# Patient Record
Sex: FEMALE | Race: WHITE | Employment: UNEMPLOYED | URBAN - METROPOLITAN AREA
[De-identification: names, ages, dates, MRNs, and addresses within clinical notes are randomized per-mention and may not be internally consistent; named-entity substitution may affect disease eponyms.]

---

## 2017-01-24 ENCOUNTER — OFFICE VISIT (OUTPATIENT)
Dept: FAMILY MEDICINE CLINIC | Age: 62
End: 2017-01-24

## 2017-01-24 VITALS
SYSTOLIC BLOOD PRESSURE: 132 MMHG | HEIGHT: 62 IN | RESPIRATION RATE: 18 BRPM | OXYGEN SATURATION: 98 % | WEIGHT: 198.4 LBS | DIASTOLIC BLOOD PRESSURE: 84 MMHG | TEMPERATURE: 98.2 F | BODY MASS INDEX: 36.51 KG/M2 | HEART RATE: 77 BPM

## 2017-01-24 DIAGNOSIS — G89.29 CHRONIC BILATERAL LOW BACK PAIN WITHOUT SCIATICA: Primary | ICD-10-CM

## 2017-01-24 DIAGNOSIS — M54.50 CHRONIC BILATERAL LOW BACK PAIN WITHOUT SCIATICA: Primary | ICD-10-CM

## 2017-01-24 DIAGNOSIS — E11.9 TYPE 2 DIABETES MELLITUS WITHOUT COMPLICATION, WITHOUT LONG-TERM CURRENT USE OF INSULIN (HCC): ICD-10-CM

## 2017-01-24 DIAGNOSIS — M17.11 ARTHRITIS OF RIGHT KNEE: ICD-10-CM

## 2017-01-24 RX ORDER — DICLOFENAC SODIUM 75 MG/1
75 TABLET, DELAYED RELEASE ORAL
Qty: 60 TAB | Refills: 1 | Status: SHIPPED | OUTPATIENT
Start: 2017-01-24 | End: 2018-09-21

## 2017-01-24 RX ORDER — TIZANIDINE 4 MG/1
4 TABLET ORAL
Qty: 45 TAB | Refills: 1 | Status: SHIPPED | OUTPATIENT
Start: 2017-01-24 | End: 2018-09-21

## 2017-01-24 NOTE — MR AVS SNAPSHOT
Visit Information Date & Time Provider Department Dept. Phone Encounter #  
 1/24/2017  3:00 PM Kaushal Mack, [address] [phone] [id number] Upcoming Health Maintenance Date Due Pneumococcal 19-64 Medium Risk (1 of 1 - PPSV23) 9/1/1975 PAP AKA CERVICAL CYTOLOGY 9/1/1977 BREAST CANCER SCRN MAMMOGRAM 9/1/2006 FOBT Q 1 YEAR AGE 50-75 9/1/2006 ZOSTER VACCINE AGE 60> 9/1/2016 FOOT EXAM Q1 11/3/2016 EYE EXAM RETINAL OR DILATED Q1 11/6/2016 MICROALBUMIN Q1 2/9/2017 LIPID PANEL Q1 2/9/2017 HEMOGLOBIN A1C Q6M 5/11/2017 DTaP/Tdap/Td series (2 - Td) 10/14/2025 Allergies as of 1/24/2017  Review Complete On: 1/24/2017 By: Kaushal Mack DO Severity Noted Reaction Type Reactions Sulfa (Sulfonamide Antibiotics)  10/09/2015   Intolerance Other (comments) Emesis Current Immunizations  Reviewed on 11/11/2016 Name Date Influenza Vaccine (Madin Welsh Canine Kidney) PF 10/10/2015  6:45 PM  
 Influenza Vaccine (Quad) PF 11/11/2016 Tdap 10/14/2015 Not reviewed this visit You Were Diagnosed With   
  
 Codes Comments Chronic bilateral low back pain without sciatica    -  Primary ICD-10-CM: M54.5, G89.29 ICD-9-CM: 724.2, 338.29 Type 2 diabetes mellitus without complication, without long-term current use of insulin (HCC)     ICD-10-CM: E11.9 ICD-9-CM: 250.00 Arthritis of right knee     ICD-10-CM: M19.90 ICD-9-CM: 716.96 Vitals BP Pulse Temp Resp Height(growth percentile) Weight(growth percentile) 132/84 77 98.2 °F (36.8 °C) (Oral) 18 5' 2\" (1.575 m) 198 lb 6.4 oz (90 kg) SpO2 BMI OB Status Smoking Status 98% 36.29 kg/m2 Postmenopausal Never Smoker Vitals History BMI and BSA Data Body Mass Index Body Surface Area  
 36.29 kg/m 2 1.98 m 2 Preferred Pharmacy Pharmacy Name Phone  [facility] [address] [facility] [phone] Your Updated Medication List  
  
   
This list is accurate as of: 1/24/17  3:39 PM.  Always use your most recent med list.  
  
  
  
  
 allopurinol 300 mg tablet Commonly known as:  ZYLOPRIM  
TAKE ONE TABLET BY MOUTH ONCE DAILY ALPRAZolam 0.25 mg tablet Commonly known as:  [place]aks Take 0.25 mg by mouth as needed for Anxiety. Blood-Glucose Meter monitoring kit Please dispense one kit  
  
 diclofenac EC 75 mg EC tablet Commonly known as:  VOLTAREN Take 1 Tab by mouth two (2) times daily as needed. glucose blood VI test strips strip Commonly known as:  blood glucose test  
Use four times a day, before meals and at bedtime, to check your blood sugar. insulin glargine 300 unit/mL (1.5 mL) Inpn Commonly known as:  TOUJEO SOLOSTAR  
50 Units by SubCUTAneous route nightly. Insulin Needles (Disposable) 31 gauge x 3/16\" Ndle Commonly known as:  PEN NEEDLE  
QHS use of lantus  
  
 lidocaine 5 % ointment Commonly known as:  XYLOCAINE Apply  to affected area as needed for Pain. lisinopril-hydroCHLOROthiazide 20-12.5 mg per tablet Commonly known as:  PRINZIDE, ZESTORETIC  
TAKE ONE TABLET BY MOUTH TWICE DAILY  
  
 nystatin 100,000 unit/mL suspension Commonly known as:  MYCOSTATIN Take 1 tsp by mouth two (2) times a day. swish and spit  
  
 omeprazole 20 mg capsule Commonly known as:  PRILOSEC Take 20 mg by mouth daily. pantoprazole 40 mg tablet Commonly known as:  PROTONIX  
TAKE ONE TABLET BY MOUTH ONCE DAILY * PARoxetine 30 mg tablet Commonly known as:  PAXIL Take 30 mg by mouth every evening. * PARoxetine 30 mg tablet Commonly known as:  PAXIL TAKE ONE TABLET BY MOUTH ONCE DAILY  
  
 simvastatin 40 mg tablet Commonly known as:  ZOCOR  
TAKE ONE TABLET BY MOUTH NIGHTLY  
  
 tiZANidine 4 mg tablet Commonly known as:  [place]ule Take 1 Tab by mouth three (3) times daily as needed. * Notice: This list has 2 medication(s) that are the same as other medications prescribed for you. Read the directions carefully, and ask your doctor or other care provider to review them with you. Prescriptions Sent to Pharmacy Refills  
 tiZANidine (ZANAFLEX) 4 mg tablet 1 Sig: Take 1 Tab by mouth three (3) times daily as needed. Class: Normal  
 Pharmacy: [address], [address] [place] [address] Ph #: [phone] Route: Oral  
 diclofenac EC (VOLTAREN) 75 mg EC tablet 1 Sig: Take 1 Tab by mouth two (2) times daily as needed. Class: Normal  
 Pharmacy: [address], [address] [place] [address] Ph #: [phone] Route: Oral  
  
We Performed the Following REFERRAL TO ORTHOPEDIC SURGERY [REF62 Custom] Comments:  
 Please evaluate patient for R knee arthritis failed injections. REFERRAL TO PHYSICAL THERAPY [EUX92 Custom] Comments:  
 Please evaluate patient for low back pain. To-Do List   
 01/24/2017 Imaging:  XR SPINE LUMB 2 OR 3 V Referral Information Referral ID Referred By Referred To  
  
 [id number] Joycelyn LINK Not Available Visits Status Start Date End Date 99 New Request 1/24/17 1/24/18 If your referral has a status of pending review or denied, additional information will be sent to support the outcome of this decision. Referral ID Referred By Referred To  
 [id number] Joycelyn LINK Not Available Visits Status Start Date End Date 1 New Request 1/24/17 1/24/18 If your referral has a status of pending review or denied, additional information will be sent to support the outcome of this decision. Patient Instructions Low Back Pain: Exercises Your Care Instructions Here are some examples of typical rehabilitation exercises for your condition. Start each exercise slowly. Ease off the exercise if you start to have pain. Your doctor or physical therapist will tell you when you can start these exercises and which ones will work best for you. How to do the exercises Press-up 1. Lie on your stomach, supporting your body with your forearms. 2. Press your elbows down into the floor to raise your upper back. As you do this, relax your stomach muscles and allow your back to arch without using your back muscles. As your press up, do not let your hips or pelvis come off the floor. 3. Hold for 15 to 30 seconds, then relax. 4. Repeat 2 to 4 times. Alternate arm and leg (bird dog) exercise Note: Do this exercise slowly. Try to keep your body straight at all times, and do not let one hip drop lower than the other. 1. Start on the floor, on your hands and knees. 2. Tighten your belly muscles. 3. Raise one leg off the floor, and hold it straight out behind you. Be careful not to let your hip drop down, because that will twist your trunk. 4. Hold for about 6 seconds, then lower your leg and switch to the other leg. 5. Repeat 8 to 12 times on each leg. 6. Over time, work up to holding for 10 to 30 seconds each time. 7. If you feel stable and secure with your leg raised, try raising the opposite arm straight out in front of you at the same time. Knee-to-chest exercise 1. Lie on your back with your knees bent and your feet flat on the floor. 2. Bring one knee to your chest, keeping the other foot flat on the floor (or keeping the other leg straight, whichever feels better on your lower back). 3. Keep your lower back pressed to the floor. Hold for at least 15 to 30 seconds. 4. Relax, and lower the knee to the starting position. 5. Repeat with the other leg. Repeat 2 to 4 times with each leg. 6. To get more stretch, put your other leg flat on the floor while pulling your knee to your chest. 
Curl-ups 1.  Lie on the floor on your back with your knees bent at a 90-degree angle. Your feet should be flat on the floor, about 12 inches from your buttocks. 2. Cross your arms over your chest. If this bothers your neck, try putting your hands behind your neck (not your head), with your elbows spread apart. 3. Slowly tighten your belly muscles and raise your shoulder blades off the floor. 4. Keep your head in line with your body, and do not press your chin to your chest. 
5. Hold this position for 1 or 2 seconds, then slowly lower yourself back down to the floor. 6. Repeat 8 to 12 times. Pelvic tilt exercise 1. Lie on your back with your knees bent. 2. \"Brace\" your stomach. This means to tighten your muscles by pulling in and imagining your belly button moving toward your spine. You should feel like your back is pressing to the floor and your hips and pelvis are rocking back. 3. Hold for about 6 seconds while you breathe smoothly. 4. Repeat 8 to 12 times. Heel dig bridging 1. Lie on your back with both knees bent and your ankles bent so that only your heels are digging into the floor. Your knees should be bent about 90 degrees. 2. Then push your heels into the floor, squeeze your buttocks, and lift your hips off the floor until your shoulders, hips, and knees are all in a straight line. 3. Hold for about 6 seconds as you continue to breathe normally, and then slowly lower your hips back down to the floor and rest for up to 10 seconds. 4. Do 8 to 12 repetitions. Hamstring stretch in doorway 1. Lie on your back in a doorway, with one leg through the open door. 2. Slide your leg up the wall to straighten your knee. You should feel a gentle stretch down the back of your leg. 3. Hold the stretch for at least 15 to 30 seconds. Do not arch your back, point your toes, or bend either knee. Keep one heel touching the floor and the other heel touching the wall. 4. Repeat with your other leg. 5. Do 2 to 4 times for each leg. Hip flexor stretch 1. Kneel on the floor with one knee bent and one leg behind you. Place your forward knee over your foot. Keep your other knee touching the floor. 2. Slowly push your hips forward until you feel a stretch in the upper thigh of your rear leg. 3. Hold the stretch for at least 15 to 30 seconds. Repeat with your other leg. 4. Do 2 to 4 times on each side. Wall sit 1. Stand with your back 10 to 12 inches away from a wall. 2. Lean into the wall until your back is flat against it. 3. Slowly slide down until your knees are slightly bent, pressing your lower back into the wall. 4. Hold for about 6 seconds, then slide back up the wall. 5. Repeat 8 to 12 times. Follow-up care is a key part of your treatment and safety. Be sure to make and go to all appointments, and call your doctor if you are having problems. It's also a good idea to know your test results and keep a list of the medicines you take. Where can you learn more? Go to http://negro-ryder.info/. Enter S426 in the search box to learn more about \"Low Back Pain: Exercises. \" Current as of: May 23, 2016 Content Version: 11.1 © 8538-2026 [employer]. Care instructions adapted under license by [employer] (which disclaims liability or warranty for this information). If you have questions about a medical condition or this instruction, always ask your healthcare professional. [place] [address] any warranty or liability for your use of this information. Introducing [facility]! Rebeca Alvarez introduces [employer] patient portal. Now you can access parts of your medical record, email your doctor's office, and request medication refills online. 1. In your internet browser, go to [email or web address] 2. Click on the First Time User? Click Here link in the Sign In box. You will see the New Member Sign Up page. 3. Enter your [employer] Access Code exactly as it appears below. You will not need to use this code after youve completed the sign-up process. If you do not sign up before the expiration date, you must request a new code. · [employer] Access Code: [email or web address] Expires: 2/9/2017  2:06 PM 
 
4. Enter the last four digits of your Social Security Number (xxxx) and Date of Birth (mm/dd/yyyy) as indicated and click Submit. You will be taken to the next sign-up page. 5. Create a Project [employer]at ID. This will be your [employer] login ID and cannot be changed, so think of one that is secure and easy to remember. 6. Create a [employer] password. You can change your password at any time. 7. Enter your Password Reset Question and Answer. This can be used at a later time if you forget your password. 8. Enter your e-mail address. You will receive e-mail notification when new information is available in [address]. 9. Click Sign Up. You can now view and download portions of your medical record. 10. Click the Download Summary menu link to download a portable copy of your medical information. If you have questions, please visit the Frequently Asked Questions section of the [employer] website. Remember, [employer] is NOT to be used for urgent needs. For medical emergencies, dial 911. Now available from your iPhone and Android! Please provide this summary of care documentation to your next provider. Your primary care clinician is listed as Nancy Boyce. If you have any questions after today's visit, please call [phone].

## 2017-01-24 NOTE — PATIENT INSTRUCTIONS

## 2017-01-24 NOTE — PROGRESS NOTES
Pt c/o lower back pain, ongoing problem, worse lately  Would like referral for ortho for knee as well

## 2017-01-24 NOTE — PROGRESS NOTES
Katiana West is a 62 y.o. female   Chief Complaint   Patient presents with    Back Pain    pt states she has been having LBP for past few months and not getting better. Does come and go. Pthas taken tylenol with little relief. Pt denies shooting pain down either leg. Pt has been using a heating pad as well with little relief. Currently pain is fine when seated gets up to 10/10 at its worst.      Pt also with R knee pain states that she heard a loud popping. Pt states she is ready to see ortho to discuss surgery. Pt states she has been working on her diet and continuing to lose [id number]. States her glucose is down in 80's and would like to back off insulin. Will decrease to 48    Chief Complaint   Patient presents with    Back Pain     she is a 62y.o. year old female who presents for evalution. Reviewed PmHx, RxHx, FmHx, SocHx, AllgHx and updated and dated in the chart. Review of Systems - negative except as listed above in the HPI    Objective:     Vitals:    01/24/17 1507   BP: 132/84   Pulse: 77   Resp: 18   Temp: 98.2 °F (36.8 °C)   TempSrc: Oral   SpO2: 98%   Weight: 198 lb 6.4 oz (90 kg)   Height: 5' 2\" (1.575 m)       Current Outpatient Prescriptions   Medication Sig    insulin glargine (TOUJEO SOLOSTAR) 300 unit/mL (1.5 mL) inpn 50 Units by SubCUTAneous route nightly.  tiZANidine (ZANAFLEX) 4 mg tablet Take 1 Tab by mouth three (3) times daily as needed.  diclofenac EC (VOLTAREN) 75 mg EC tablet Take 1 Tab by mouth two (2) times daily as needed.  pantoprazole (PROTONIX) 40 mg tablet TAKE ONE TABLET BY MOUTH ONCE DAILY    simvastatin (ZOCOR) 40 mg tablet TAKE ONE TABLET BY MOUTH NIGHTLY    PARoxetine (PAXIL) 30 mg tablet TAKE ONE TABLET BY MOUTH ONCE DAILY    lidocaine (XYLOCAINE) 5 % ointment Apply  to affected area as needed for Pain.     lisinopril-hydrochlorothiazide (PRINZIDE, ZESTORETIC) 20-12.5 mg per tablet TAKE ONE TABLET BY MOUTH TWICE DAILY    allopurinol (ZYLOPRIM) 300 mg tablet TAKE ONE TABLET BY MOUTH ONCE DAILY    Insulin Needles, Disposable, (PEN NEEDLE) 31 gauge x 3/16\" ndle QHS use of lantus    Blood-Glucose Meter monitoring kit Please dispense one kit    glucose blood VI test strips (BLOOD GLUCOSE TEST) strip Use four times a day, before meals and at bedtime, to check your blood sugar.  ALPRAZolam (XANAX) 0.25 mg tablet Take 0.25 mg by mouth as needed for Anxiety.  nystatin (MYCOSTATIN) 100,000 unit/mL suspension Take 1 tsp by mouth two (2) times a day. swish and spit    PARoxetine (PAXIL) 30 mg tablet Take 30 mg by mouth every evening.  omeprazole (PRILOSEC) 20 mg capsule Take 20 mg by mouth daily. No current facility-administered medications for this visit. Physical Examination: General appearance - alert, well appearing, and in no distress  Eyes - pupils equal and reactive, extraocular eye movements intact  Chest - clear to auscultation, no wheezes, rales or rhonchi, symmetric air entry  Heart - normal rate, regular rhythm, normal S1, S2, no murmurs, rubs, clicks or gallops  Back exam -decreased ROM, no tenderness, palpable spasm      Assessment/ Plan:   [facility] [place] was seen today for back pain. Diagnoses and all orders for this visit:    Chronic bilateral low back pain without sciatica  -     XR SPINE LUMB 2 OR 3 V; Future  -     REFERRAL TO PHYSICAL THERAPY  -     tiZANidine (ZANAFLEX) 4 mg tablet; Take 1 Tab by mouth three (3) times daily as needed. -     diclofenac EC (VOLTAREN) 75 mg EC tablet; Take 1 Tab by mouth two (2) times daily as needed. Type 2 diabetes mellitus without complication, without long-term current use of insulin ([facility])  -     insulin glargine (TOUJEO SOLOSTAR) 300 unit/mL (1.5 mL) inpn; 50 Units by SubCUTAneous route nightly. Arthritis of right knee  -     REFERRAL TO ORTHOPEDIC SURGERY       Follow-up Disposition:  Return if symptoms worsen or fail to improve.     I have discussed the diagnosis with the patient and the intended plan as seen in the above orders. The patient has received an after-visit summary and questions were answered concerning future plans. Pt conveyed understanding of plan.     Medication Side Effects and Warnings were discussed with patient      Geoffery Mobile, DO

## 2017-02-03 ENCOUNTER — TELEPHONE (OUTPATIENT)
Dept: FAMILY MEDICINE CLINIC | Age: 62
End: 2017-02-03

## 2017-02-22 DIAGNOSIS — E11.9 TYPE 2 DIABETES MELLITUS WITHOUT COMPLICATION, WITHOUT LONG-TERM CURRENT USE OF INSULIN (HCC): ICD-10-CM

## 2017-03-27 RX ORDER — PANTOPRAZOLE SODIUM 40 MG/1
TABLET, DELAYED RELEASE ORAL
Qty: 30 TAB | Refills: 0 | Status: SHIPPED | OUTPATIENT
Start: 2017-03-27 | End: 2017-05-03 | Stop reason: SDUPTHER

## 2017-04-04 ENCOUNTER — PATIENT OUTREACH (OUTPATIENT)
Dept: FAMILY MEDICINE CLINIC | Age: 62
End: 2017-04-04

## 2017-04-04 NOTE — Clinical Note
Dr Byron Guajardo, [facility]--- 4/4/17, This writer/NN and [facility] access, able to confirm, patient hospitalized, 3/28/17 - 3/31/17 at [facility] related to Right Knee Osteoarthritis/Right Total Knee Arthroplasty by Dr Lima Leach. Printed Operative Report, Consult Note and Discharge Summary for Dr Byron Guajardo to review. Per Discharge Summary: Tylenol for pain control, Narcotics causing pt to be lethargic. SCD's and ASA 325mg BID for DVT proph x 4 weeks. Hyponatremic post op(Na at 127 on 3/30/17 and Na at 130 on 3/31/17), fluid restriction, 2000cc/no free water and PCP F/U. Patient last seen by Dr Byron Guajardo, 1/24/17.  Past Medical History Includes: Diabetes(11/2016 A1C at 7.9, up with cortisone injection) Thanks, [facility]

## 2017-05-04 RX ORDER — PANTOPRAZOLE SODIUM 40 MG/1
TABLET, DELAYED RELEASE ORAL
Qty: 30 TAB | Refills: 0 | Status: SHIPPED | OUTPATIENT
Start: 2017-05-04 | End: 2017-06-03 | Stop reason: SDUPTHER

## 2017-05-24 ENCOUNTER — PATIENT OUTREACH (OUTPATIENT)
Dept: FAMILY MEDICINE CLINIC | Age: 62
End: 2017-05-24

## 2017-05-24 NOTE — PROGRESS NOTES
Patient S/P 3/28/17 - 3/31/17 at [facility] related to Right Knee Osteoarthritis/Right Total Knee Arthroplasty by Dr Marizol Haile.  Per Discharge Summary:  Tylenol for pain control, Narcotics causing pt to be lethargic. SCD's and ASA 325mg BID for DVT proph x 4 weeks. Hyponatremic post op(Na at 127 on 3/30/17 and Na at 130 on 3/31/17), fluid restriction, 2000cc/no free water and PCP F/U. Patient last seen by Dr Saulo Kent, 1/24/17. Past Medical History Includes: Diabetes(11/2016 A1C at 7.9, up with cortisone injection), Hyperlipidemia, HTN, Psoriasis, Arthritis/Pain, and Depression. 5/24/17, This writer/NN attempted to contact patient at listed phone number, however, not in service. 5/24/17, This writer/NN attempted to contact patient at listed mobile number,  left voicemail message with IFP/NN contact information and reminder to call with questions, concerns or need for appts. PLAN: No further ED/Hospitalizations noted at this time. Will close above transitions of care/post hospitalization episode and remain available for further NN needs. Should patient return call or be seen for F/U appt, discuss NN role and purpose of outreach attempts, post hospitalization. Discuss discharge instructions, further symptoms or concerns, medications(Compliance and Reconciliation), recent blood sugar readings, HH progress/?agency, and F/U appts. Provide instruction, goal work and resource connection as indicated.

## 2017-06-05 RX ORDER — PANTOPRAZOLE SODIUM 40 MG/1
TABLET, DELAYED RELEASE ORAL
Qty: 30 TAB | Refills: 5 | Status: SHIPPED | OUTPATIENT
Start: 2017-06-05 | End: 2017-12-04 | Stop reason: SDUPTHER

## 2017-06-06 ENCOUNTER — OFFICE VISIT (OUTPATIENT)
Dept: FAMILY MEDICINE CLINIC | Age: 62
End: 2017-06-06

## 2017-06-06 VITALS
WEIGHT: 172.6 LBS | BODY MASS INDEX: 31.76 KG/M2 | RESPIRATION RATE: 18 BRPM | SYSTOLIC BLOOD PRESSURE: 112 MMHG | TEMPERATURE: 98.2 F | OXYGEN SATURATION: 100 % | HEART RATE: 80 BPM | DIASTOLIC BLOOD PRESSURE: 88 MMHG | HEIGHT: 62 IN

## 2017-06-06 DIAGNOSIS — Z12.11 COLON CANCER SCREENING: ICD-10-CM

## 2017-06-06 DIAGNOSIS — E11.9 TYPE 2 DIABETES MELLITUS WITHOUT COMPLICATION, WITHOUT LONG-TERM CURRENT USE OF INSULIN (HCC): Primary | ICD-10-CM

## 2017-06-06 DIAGNOSIS — Z00.00 ROUTINE GENERAL MEDICAL EXAMINATION AT A HEALTH CARE FACILITY: ICD-10-CM

## 2017-06-06 DIAGNOSIS — Z12.39 SCREENING FOR MALIGNANT NEOPLASM OF BREAST: ICD-10-CM

## 2017-06-06 DIAGNOSIS — I10 ESSENTIAL HYPERTENSION: ICD-10-CM

## 2017-06-06 DIAGNOSIS — E78.00 HYPERCHOLESTEROLEMIA: ICD-10-CM

## 2017-06-06 DIAGNOSIS — Z23 ENCOUNTER FOR IMMUNIZATION: ICD-10-CM

## 2017-06-06 DIAGNOSIS — Z13.39 SCREENING FOR ALCOHOLISM: ICD-10-CM

## 2017-06-06 NOTE — MR AVS SNAPSHOT
Visit Information Date & Time Provider Department Dept. Phone Encounter #  
 6/6/2017  9:15 AM Quiana Koch, Naida Olivera [phone] [id number] Follow-up Instructions Return in about 3 months (around 9/6/2017), or if symptoms worsen or fail to improve. Upcoming Health Maintenance Date Due Pneumococcal 19-64 Medium Risk (1 of 1 - PPSV23) 9/1/1975 PAP AKA CERVICAL CYTOLOGY 9/1/1977 BREAST CANCER SCRN MAMMOGRAM 9/1/2006 FOBT Q 1 YEAR AGE 50-75 9/1/2006 ZOSTER VACCINE AGE 60> 9/1/2016 FOOT EXAM Q1 11/3/2016 EYE EXAM RETINAL OR DILATED Q1 11/6/2016 MICROALBUMIN Q1 2/9/2017 LIPID PANEL Q1 2/9/2017 HEMOGLOBIN A1C Q6M 5/11/2017 INFLUENZA AGE 9 TO ADULT 8/1/2017 DTaP/Tdap/Td series (2 - Td) 10/14/2025 Allergies as of 6/6/2017  Review Complete On: 6/6/2017 By: Quiana Koch DO Severity Noted Reaction Type Reactions Sulfa (Sulfonamide Antibiotics)  10/09/2015   Intolerance Other (comments) Emesis Current Immunizations  Reviewed on 11/11/2016 Name Date Influenza Vaccine (Madin Sheela Canine Kidney) PF 10/10/2015  6:45 PM  
 Influenza Vaccine (Quad) PF 11/11/2016 Pneumococcal Polysaccharide (PPSV-23)  Incomplete Tdap 10/14/2015 Not reviewed this visit You Were Diagnosed With   
  
 Codes Comments Type 2 diabetes mellitus without complication, without long-term current use of insulin (HCC)    -  Primary ICD-10-CM: E11.9 ICD-9-CM: 250.00 Essential hypertension     ICD-10-CM: I10 
ICD-9-CM: 401.9 Hypercholesterolemia     ICD-10-CM: E78.00 ICD-9-CM: 272.0 Screening for malignant neoplasm of breast     ICD-10-CM: Z12.39 
ICD-9-CM: V76.10 Colon cancer screening     ICD-10-CM: Z12.11 ICD-9-CM: V76.51 Routine general medical examination at a health care facility     ICD-10-CM: Z00.00 ICD-9-CM: V70.0 Screening for alcoholism     ICD-10-CM: Z13.89 ICD-9-CM: V79.1 Encounter for immunization     ICD-10-CM: W37 ICD-9-CM: V03.89 Vitals BP Pulse Temp Resp Height(growth percentile) Weight(growth percentile) 112/88 80 98.2 °F (36.8 °C) (Oral) 18 5' 2\" (1.575 m) 172 lb 9.6 oz (78.3 kg) SpO2 BMI OB Status Smoking Status 100% 31.57 kg/m2 Postmenopausal Never Smoker Vitals History BMI and BSA Data Body Mass Index Body Surface Area  
 31.57 kg/m 2 1.85 m 2 Preferred Pharmacy Pharmacy Name Phone [facility], [address] [place] [address] Your Updated Medication List  
  
   
This list is accurate as of: 6/6/17 10:27 AM.  Always use your most recent med list.  
  
  
  
  
 allopurinol 300 mg tablet Commonly known as:  ZYLOPRIM  
TAKE ONE TABLET BY MOUTH ONCE DAILY ALPRAZolam 0.25 mg tablet Commonly known as:  Meena Mannheim Take 0.25 mg by mouth as needed for Anxiety. Blood-Glucose Meter monitoring kit Please dispense one kit  
  
 diclofenac EC 75 mg EC tablet Commonly known as:  VOLTAREN Take 1 Tab by mouth two (2) times daily as needed. glucose blood VI test strips strip Commonly known as:  blood glucose test  
Use four times a day, before meals and at bedtime, to check your blood sugar. insulin glargine 300 unit/mL (1.5 mL) Inpn Commonly known as:  TOUJEO SOLOSTAR  
45 Units by SubCUTAneous route nightly. Insulin Needles (Disposable) 31 gauge x 3/16\" Ndle Commonly known as:  PEN NEEDLE  
QHS use of lantus  
  
 lidocaine 5 % ointment Commonly known as:  XYLOCAINE Apply  to affected area as needed for Pain. lisinopril-hydroCHLOROthiazide 20-12.5 mg per tablet Commonly known as:  PRINZIDE, ZESTORETIC  
TAKE ONE TABLET BY MOUTH TWICE DAILY  
  
 nystatin 100,000 unit/mL suspension Commonly known as:  MYCOSTATIN Take 1 tsp by mouth two (2) times a day. swish and spit  
  
 omeprazole 20 mg capsule Commonly known as:  PRILOSEC Take 20 mg by mouth daily. pantoprazole 40 mg tablet Commonly known as:  PROTONIX  
TAKE ONE TABLET BY MOUTH ONCE DAILY * PARoxetine 30 mg tablet Commonly known as:  PAXIL Take 30 mg by mouth every evening. * PARoxetine 30 mg tablet Commonly known as:  PAXIL TAKE ONE TABLET BY MOUTH ONCE DAILY  
  
 simvastatin 40 mg tablet Commonly known as:  ZOCOR  
TAKE ONE TABLET BY MOUTH NIGHTLY. tiZANidine 4 mg tablet Commonly known as:  Gerarda Gena Take 1 Tab by mouth three (3) times daily as needed. * Notice: This list has 2 medication(s) that are the same as other medications prescribed for you. Read the directions carefully, and ask your doctor or other care provider to review them with you. We Performed the Following HEMOGLOBIN A1C WITH EAG [05431 CPT(R)] LIPID PANEL [05703 CPT(R)] METABOLIC PANEL, COMPREHENSIVE [86077 CPT(R)] MICROALBUMIN, UR, RAND W/ MICROALBUMIN/CREA RATIO [id number] CPT(R)] OCCULT BLOOD, IMMUNOASSAY (FIT) [id number] CPT(R)] PNEUMOCOCCAL POLYSACCHARIDE VACCINE, 23-VALENT, ADULT OR IMMUNOSUPPRESSED PT DOSE, [65257 CPT(R)] [place] IMMUNIZ ADMIN,1 SINGLE/COMB VAC/TOXOID [id number] CPT(R)] Follow-up Instructions Return in about 3 months (around 9/6/2017), or if symptoms worsen or fail to improve. To-Do List   
 06/06/2017 Imaging:  PATY MAMMO BI SCREENING INCL CAD Patient Instructions Nutrition Tips for Diabetes: After Your Visit Your Care Instructions A healthy diet is important to manage diabetes. It helps you lose weight (if you need to) and keep it off. It gives you the nutrition and energy your body needs and helps prevent heart disease. But a diet for diabetes does not mean that you have to eat special foods. You can eat what your family eats, including occasional sweets and other favorites.  But you do have to pay attention to how often you eat and how much you eat of certain foods. The right plan for you will give you meals that help you keep your blood sugar at healthy levels. Try to eat a variety of foods and to spread carbohydrate throughout the day. Carbohydrate raises blood sugar higher and more quickly than any other nutrient does. Carbohydrate is found in sugar, breads and cereals, fruit, starchy vegetables such as potatoes and corn, and milk and yogurt. You may want to work with a dietitian or diabetes educator to help you plan meals and snacks. A dietitian or diabetes educator also can help you lose weight if that is one of your goals. The following tips can help you enjoy your meals and stay healthy. Follow-up care is a key part of your treatment and safety. Be sure to make and go to all appointments, and call your doctor if you are having problems. Its also a good idea to know your test results and keep a list of the medicines you take. How can you care for yourself at home? · Learn which foods have carbohydrate and how much carbohydrate to eat. A dietitian or diabetes educator can help you learn to keep track of how much carbohydrate you eat. · Spread carbohydrate throughout the day. Eat some carbohydrate at all meals, but do not eat too much at any one time. · Plan meals to include food from all the food groups. These are the food groups and some example portion sizes: ¨ Grains: 1 slice of bread (1 ounce), ½ cup of cooked cereal, and 1/3 cup of cooked pasta or rice. These have about 15 grams of carbohydrate in a serving. Choose whole grains such as whole wheat bread or crackers, oatmeal, and brown rice more often than refined grains. ¨ Fruit: 1 small fresh fruit, such as an apple or orange; ½ of a banana; ½ cup of chopped, cooked, or canned fruit; ½ cup of fruit juice; 1 cup of melon or raspberries; and 2 tablespoons of dried fruit. These have about 15 grams of carbohydrate in a serving. ¨ Dairy: 1 cup of nonfat or low-fat milk and 2/3 cup of plain yogurt. These have about 15 grams of carbohydrate in a serving. ¨ Protein foods: Beef, chicken, turkey, fish, eggs, tofu, cheese, cottage cheese, and peanut butter. A serving size of meat is 3 ounces, which is about the size of a deck of cards. Examples of meat substitute serving sizes (equal to 1 ounce of meat) are 1/4 cup of cottage cheese, 1 egg, 1 tablespoon of peanut butter, and ½ cup of tofu. These have very little or no carbohydrate per serving. ¨ Vegetables: Starchy vegetables such as ½ cup of cooked dried beans, peas, potatoes, or corn have about 15 grams of carbohydrate. Nonstarchy vegetables have very little carbohydrate, such as 1 cup of raw leafy vegetables (such as spinach), ½ cup of other vegetables (cooked or chopped), and 3/4 cup of vegetable juice. · Use the plate format to plan meals. It is a good, quick way to make sure that you have a balanced meal. It also helps you spread carbohydrate throughout the day. You divide your plate by types of foods. Put vegetables on half the plate, meat or meat substitutes on one-quarter of the plate, and a grain or starchy vegetable (such as brown rice or a potato) in the final quarter of the plate. To this you can add a small piece of fruit and 1 cup of milk or yogurt, depending on how much carbohydrate you are supposed to eat at a meal. 
· Talk to your dietitian or diabetes educator about ways to add limited amounts of sweets into your meal plan. You can eat these foods now and then, as long as you include the amount of carbohydrate they have in your daily carbohydrate allowance. · If you drink alcohol, limit it to no more than 1 drink a day for women and 2 drinks a day for men. If you are pregnant, no amount of alcohol is known to be safe. · Protein, fat, and fiber do not raise blood sugar as much as carbohydrate does.  If you eat a lot of these nutrients in a meal, your blood sugar will rise more slowly than it would otherwise. · Limit saturated fats, such as those from meat and dairy products. Try to replace it with monounsaturated fat, such as olive oil. This is a healthier choice because people who have diabetes are at higher-than-average risk of heart disease. But use a modest amount of olive oil. A tablespoon of olive oil has 14 grams of fat and 120 calories. · Exercise lowers blood sugar. If you take insulin by shots or pump, you can use less than you would if you were not exercising. Keep in mind that timing matters. If you exercise within 1 hour after a meal, your body may need less insulin for that meal than it would if you exercised 3 hours after the meal. Test your blood sugar to find out how exercise affects your need for insulin. · Exercise on most days of the week. Aim for at least 30 minutes. Exercise helps you stay at a healthy weight and helps your body use insulin. Walking is an easy way to get exercise. Gradually increase the amount you walk every day. You also may want to swim, bike, or do other activities. When you eat out · Learn to estimate the serving sizes of foods that have carbohydrate. If you measure food at home, it will be easier to estimate the amount in a serving of restaurant food. · If the meal you order has too much carbohydrate (such as potatoes, corn, or baked beans), ask to have a low-carbohydrate food instead. Ask for a salad or green vegetables. · If you use insulin, check your blood sugar before and after eating out to help you plan how much to eat in the future. · If you eat more carbohydrate at a meal than you had planned, take a walk or do other exercise. This will help lower your blood sugar. Where can you learn more? Go to [employer] Enter V217 in the search box to learn more about \"Nutrition Tips for Diabetes: After Your Visit. \"  
© 3949-8328 [employer].  Care instructions adapted under license by [facility] (which disclaims liability or warranty for this information). This care instruction is for use with your licensed healthcare professional. If you have questions about a medical condition or this instruction, always ask your healthcare professional. [address] any warranty or liability for your use of this information. Content Version: 57.9.228705; Current as of: June 4, 2014 Introducing [facility] SERVICES! [facility] introduces [employer] patient portal. Now you can access parts of your medical record, email your doctor's office, and request medication refills online. 1. In your internet browser, go to [email or web address] 2. Click on the First Time User? Click Here link in the Sign In box. You will see the New Member Sign Up page. 3. Enter your [employer] Access Code exactly as it appears below. You will not need to use this code after youve completed the sign-up process. If you do not sign up before the expiration date, you must request a new code. · [employer] Access Code: [email or web address] Expires: 9/4/2017 10:27 AM 
 
4. Enter the last four digits of your Social Security Number (xxxx) and Date of Birth (mm/dd/yyyy) as indicated and click Submit. You will be taken to the next sign-up page. 5. Create a [employer] ID. This will be your [employer] login ID and cannot be changed, so think of one that is secure and easy to remember. 6. Create a [employer] password. You can change your password at any time. 7. Enter your Password Reset Question and Answer. This can be used at a later time if you forget your password. 8. Enter your e-mail address. You will receive e-mail notification when new information is available in [address] 9. Click Sign Up. You can now view and download portions of your medical record. 10. Click the Download Summary menu link to download a portable copy of your medical information. If you have questions, please visit the Frequently Asked Questions section of the [employer] website. Remember, [employer] is NOT to be used for urgent needs. For medical emergencies, dial 911. Now available from your iPhone and Android! Please provide this summary of care documentation to your next provider. Your primary care clinician is listed as Rebecca Milligan. If you have any questions after today's visit, please call [phone].

## 2017-06-06 NOTE — PATIENT INSTRUCTIONS

## 2017-06-06 NOTE — PROGRESS NOTES
Alexis Kurtz is a 62 y.o. female   Chief Complaint   Patient presents with    Follow-up    Pt here for f/u and had a total R knee 2 months prior and is doing PT. States she is about finished with PT and is getting better ROM. Pt also with dm and has lost 26 lbs since her prior visit. Pt states she has made lifestyle changes so that she can maintain her diet. Pt states avoiding junk food. BP remains well controlled. Due for recheck of chol and is fasting last check >1 year ago but at goal then    she is a 62y.o. year old female who presents for evalution. Reviewed PmHx, RxHx, FmHx, SocHx, AllgHx and updated and dated in the chart. Review of Systems - negative except as listed above in the HPI    Objective:     Vitals:    06/06/17 0948   BP: 112/88   Pulse: 80   Resp: 18   Temp: 98.2 °F (36.8 °C)   TempSrc: Oral   SpO2: 100%   Weight: 172 lb 9.6 oz (78.3 kg)   Height: 5' 2\" (1.575 m)       Current Outpatient Prescriptions   Medication Sig    pantoprazole (PROTONIX) 40 mg tablet TAKE ONE TABLET BY MOUTH ONCE DAILY    simvastatin (ZOCOR) 40 mg tablet TAKE ONE TABLET BY MOUTH NIGHTLY.  PARoxetine (PAXIL) 30 mg tablet TAKE ONE TABLET BY MOUTH ONCE DAILY    insulin glargine (TOUJEO SOLOSTAR) 300 unit/mL (1.5 mL) inpn 45 Units by SubCUTAneous route nightly.  lisinopril-hydroCHLOROthiazide (PRINZIDE, ZESTORETIC) 20-12.5 mg per tablet TAKE ONE TABLET BY MOUTH TWICE DAILY    tiZANidine (ZANAFLEX) 4 mg tablet Take 1 Tab by mouth three (3) times daily as needed.  diclofenac EC (VOLTAREN) 75 mg EC tablet Take 1 Tab by mouth two (2) times daily as needed.  lidocaine (XYLOCAINE) 5 % ointment Apply  to affected area as needed for Pain.     allopurinol (ZYLOPRIM) 300 mg tablet TAKE ONE TABLET BY MOUTH ONCE DAILY    Insulin Needles, Disposable, (PEN NEEDLE) 31 gauge x 3/16\" ndle QHS use of lantus    Blood-Glucose Meter monitoring kit Please dispense one kit    glucose blood VI test strips (BLOOD GLUCOSE TEST) strip Use four times a day, before meals and at bedtime, to check your blood sugar.  ALPRAZolam (XANAX) 0.25 mg tablet Take 0.25 mg by mouth as needed for Anxiety.  nystatin (MYCOSTATIN) 100,000 unit/mL suspension Take 1 tsp by mouth two (2) times a day. swish and spit    PARoxetine (PAXIL) 30 mg tablet Take 30 mg by mouth every evening.  omeprazole (PRILOSEC) 20 mg capsule Take 20 mg by mouth daily. No current facility-administered medications for this visit. Physical Examination: General appearance - alert, well appearing, and in no distress  Eyes - pupils equal and reactive, extraocular eye movements intact  Chest - clear to auscultation, no wheezes, rales or rhonchi, symmetric air entry  Heart - normal rate, regular rhythm, normal S1, S2, no murmurs, rubs, clicks or gallops  Abdomen - soft, nontender, nondistended, no masses or organomegaly  Extremities - peripheral pulses normal, no pedal edema, no clubbing or cyanosis, feet normal, good pulses, normal color, temperature and sensation, monofilament sensory exam is normal in both feet, no edema, redness or tenderness in the calves or thighs  Skin - normal coloration and turgor, no rashes, no suspicious skin lesions noted      Assessment/ Plan:   Pearl Dudley was seen today for follow-up. Diagnoses and all orders for this visit:    Type 2 diabetes mellitus without complication, without long-term current use of insulin (HCC)  -     MICROALBUMIN, UR, RAND W/ MICROALBUMIN/CREA RATIO  -     HEMOGLOBIN A1C WITH EAG    Essential hypertension  @ goal on meds  Hypercholesterolemia  -     METABOLIC PANEL, COMPREHENSIVE  -     LIPID PANEL    Screening for malignant neoplasm of breast  -     PATY MAMMO BI SCREENING INCL CAD;  Future    Colon cancer screening  -     OCCULT BLOOD, IMMUNOASSAY (FIT)    Routine general medical examination at a health care facility    Screening for alcoholism    Encounter for immunization  -     Cancel: Pneumococcal polysaccharide vaccine, 23-valent, adult or immunosuppressed pt dose  -     Cancel: [place] IMMUNIZ ADMIN,1 SINGLE/COMB VAC/TOXOID       Follow-up Disposition:  Return in about 3 months (around 9/6/2017), or if symptoms worsen or fail to improve. I have discussed the diagnosis with the patient and the intended plan as seen in the above orders. The patient has received an after-visit summary and questions were answered concerning future plans. Pt conveyed understanding of plan. Medication Side Effects and Warnings were discussed with patient      Joanie Li, DO       This is an Initial Medicare Annual Wellness Exam (AWV) (Performed 12 months after IPPE or effective date of Medicare Part B enrollment, Once in a lifetime)    I have reviewed the patient's medical history in detail and updated the computerized patient record. History     Past Medical History:   Diagnosis Date    Arthritis     Diabetes ([address]     Gout     Hernia, hiatal     Hypertension       Past Surgical History:   Procedure Laterality Date    HX KNEE REPLACEMENT Right      Current Outpatient Prescriptions   Medication Sig Dispense Refill    pantoprazole (PROTONIX) 40 mg tablet TAKE ONE TABLET BY MOUTH ONCE DAILY 30 Tab 5    simvastatin (ZOCOR) 40 mg tablet TAKE ONE TABLET BY MOUTH NIGHTLY. 30 Tab 5    PARoxetine (PAXIL) 30 mg tablet TAKE ONE TABLET BY MOUTH ONCE DAILY 30 Tab 5    insulin glargine (TOUJEO SOLOSTAR) 300 unit/mL (1.5 mL) inpn 45 Units by SubCUTAneous route nightly. 3 Adjustable Dose Pre-filled Pen Syringe 3    lisinopril-hydroCHLOROthiazide (PRINZIDE, ZESTORETIC) 20-12.5 mg per tablet TAKE ONE TABLET BY MOUTH TWICE DAILY 60 Tab 5    tiZANidine (ZANAFLEX) 4 mg tablet Take 1 Tab by mouth three (3) times daily as needed. 45 Tab 1    diclofenac EC (VOLTAREN) 75 mg EC tablet Take 1 Tab by mouth two (2) times daily as needed. 60 Tab 1    lidocaine (XYLOCAINE) 5 % ointment Apply  to affected area as needed for Pain.  50 g 11  allopurinol (ZYLOPRIM) 300 mg tablet TAKE ONE TABLET BY MOUTH ONCE DAILY 30 Tab 11    Insulin Needles, Disposable, (PEN NEEDLE) 31 gauge x 3/16\" ndle QHS use of lantus 30 Pen Needle 11    Blood-Glucose Meter monitoring kit Please dispense one kit 1 Kit 0    glucose blood VI test strips (BLOOD GLUCOSE TEST) strip Use four times a day, before meals and at bedtime, to check your blood sugar. 100 Strip 11    ALPRAZolam (XANAX) 0.25 mg tablet Take 0.25 mg by mouth as needed for Anxiety.  nystatin (MYCOSTATIN) 100,000 unit/mL suspension Take 1 tsp by mouth two (2) times a day. swish and spit      PARoxetine (PAXIL) 30 mg tablet Take 30 mg by mouth every evening.  omeprazole (PRILOSEC) 20 mg capsule Take 20 mg by mouth daily. Allergies   Allergen Reactions    Sulfa (Sulfonamide Antibiotics) Other (comments)     Emesis     Family History   Problem Relation Age of Onset    Cancer Mother     Heart Disease Father     Diabetes Maternal Grandmother      Social History   Substance Use Topics    Smoking status: Never Smoker    Smokeless tobacco: Never Used    Alcohol use No     Patient Active Problem List   Diagnosis Code    DM type 2 (diabetes mellitus, type 2) ([facility] E11.9    HTN (hypertension) I10    Hypercholesterolemia E78.00    Psoriatic arthritis ([facility] L40.50    Psoriasis L40.9    GERD (gastroesophageal reflux disease) K21.9    Hyperglycemia due to type 2 diabetes mellitus ([facility] E11.65    Personal history of gout Z87.39    Depression F32.9    UTI (lower urinary tract infection) N39.0    Acute kidney injury ([facility] N17.9         Depression Risk Factor Screening:     PHQ over the last two weeks 4/1/2015   Little interest or pleasure in doing things Not at all   Feeling down, depressed or hopeless Not at all   Total Score PHQ 2 0     Alcohol Risk Factor Screening: On any occasion during the past 3 months, have you had more than 3 drinks containing alcohol?   No    Do you average more than 7 drinks per week? No    Functional Ability and Level of Safety:     Hearing Loss   none    Activities of Daily Living   Self-care. Requires assistance with: no ADLs    Fall Risk   No flowsheet data found. Abuse Screen   Patient is not abused    Review of Systems   A comprehensive review of systems was negative except for that written in the HPI. Physical Examination     No exam data present    Evaluation of Cognitive Function:  Mood/affect:  happy  Appearance: age appropriate  Family member/caregiver input: n/a    See above PE    Patient Care Team:  Radhames García DO as PCP - General (Family Practice)  Rad Robertson RN as Ambulatory Care Navigator    Advice/Referrals/Counseling   Education and counseling provided:  Are appropriate based on today's review and evaluation  End-of-Life planning (with patient's consent)      Assessment/Plan       ICD-10-CM ICD-9-CM    1. Type 2 diabetes mellitus without complication, without long-term current use of insulin (HCC) E11.9 250.00 MICROALBUMIN, UR, RAND W/ MICROALBUMIN/CREA RATIO      HEMOGLOBIN A1C WITH EAG   2. Essential hypertension I10 401.9    3. Hypercholesterolemia H20.33 024.9 METABOLIC PANEL, COMPREHENSIVE      LIPID PANEL   4. Screening for malignant neoplasm of breast Z12.39 V76.10 PATY MAMMO BI SCREENING INCL CAD   5. Colon cancer screening Z12.11 V76.51 OCCULT BLOOD, IMMUNOASSAY (FIT)   6. Routine general medical examination at a health care facility Z00.00 V70.0    7. Screening for alcoholism Z13.89 V79.1    8. Encounter for immunization Z23 V03.89 CANCELED: PNEUMOCOCCAL POLYSACCHARIDE VACCINE, 23-VALENT, ADULT OR IMMUNOSUPPRESSED PT DOSE,      CANCELED: [place] IMMUNIZ ADMIN,1 SINGLE/COMB VAC/TOXOID   . I have discussed the diagnosis with the patient and the intended plan as seen in the above orders. The patient has received an after-visit summary and questions were answered concerning future plans. Pt conveyed understanding of plan.       Dr Franky Allen H Lobb

## 2017-06-07 DIAGNOSIS — E11.9 TYPE 2 DIABETES MELLITUS WITHOUT COMPLICATION, WITHOUT LONG-TERM CURRENT USE OF INSULIN (HCC): Primary | ICD-10-CM

## 2017-06-07 LAB
ALBUMIN SERPL-MCNC: 4.3 G/DL (ref 3.6–4.8)
ALBUMIN/GLOB SERPL: 1.8 {RATIO} (ref 1.2–2.2)
ALP SERPL-CCNC: 87 IU/L (ref 39–117)
ALT SERPL-CCNC: 22 IU/L (ref 0–32)
AST SERPL-CCNC: 23 IU/L (ref 0–40)
BILIRUB SERPL-MCNC: 0.3 MG/DL (ref 0–1.2)
BUN SERPL-MCNC: 20 MG/DL (ref 8–27)
BUN/CREAT SERPL: 20 (ref 12–28)
CALCIUM SERPL-MCNC: 10.4 MG/DL (ref 8.7–10.3)
CHLORIDE SERPL-SCNC: 98 MMOL/L (ref 96–106)
CHOLEST SERPL-MCNC: 182 MG/DL (ref 100–199)
CO2 SERPL-SCNC: 26 MMOL/L (ref 18–29)
CREAT SERPL-MCNC: 0.98 MG/DL (ref 0.57–1)
CREAT UR-MCNC: NORMAL MG/DL
EST. AVERAGE GLUCOSE BLD GHB EST-MCNC: 111 MG/DL
GLOBULIN SER CALC-MCNC: 2.4 G/DL (ref 1.5–4.5)
GLUCOSE SERPL-MCNC: 96 MG/DL (ref 65–99)
HBA1C MFR BLD: 5.5 % (ref 4.8–5.6)
HDLC SERPL-MCNC: 67 MG/DL
INTERPRETATION, 910389: NORMAL
LDLC SERPL CALC-MCNC: 91 MG/DL (ref 0–99)
Lab: NORMAL
MICROALBUMIN UR-MCNC: NORMAL
POTASSIUM SERPL-SCNC: 4.5 MMOL/L (ref 3.5–5.2)
PROT SERPL-MCNC: 6.7 G/DL (ref 6–8.5)
SODIUM SERPL-SCNC: 142 MMOL/L (ref 134–144)
TRIGL SERPL-MCNC: 118 MG/DL (ref 0–149)
VLDLC SERPL CALC-MCNC: 24 MG/DL (ref 5–40)

## 2017-06-07 RX ORDER — METFORMIN HYDROCHLORIDE 500 MG/1
500 TABLET ORAL 2 TIMES DAILY WITH MEALS
Qty: 60 TAB | Refills: 5 | Status: SHIPPED | OUTPATIENT
Start: 2017-06-07 | End: 2017-12-06 | Stop reason: SDUPTHER

## 2017-06-07 NOTE — PROGRESS NOTES
Called pt and discussed excellent control of DM and now in normal range with A1C will stop lantus and start on metformin. Pt has taken this in past and has tolerated fine. Will recheck A1C in 3 months to ensure holding steady and if so will move visits out to Q6 months again. Pt in agreement with plan.

## 2017-08-30 DIAGNOSIS — F32.A DEPRESSION, UNSPECIFIED DEPRESSION TYPE: ICD-10-CM

## 2017-08-31 RX ORDER — PAROXETINE 30 MG/1
TABLET, FILM COATED ORAL
Qty: 30 TAB | Refills: 5 | Status: SHIPPED | OUTPATIENT
Start: 2017-08-31 | End: 2018-02-24 | Stop reason: SDUPTHER

## 2017-09-07 DIAGNOSIS — E78.00 HYPERCHOLESTEROLEMIA: ICD-10-CM

## 2017-09-07 RX ORDER — SIMVASTATIN 40 MG/1
TABLET, FILM COATED ORAL
Qty: 30 TAB | Refills: 5 | Status: SHIPPED | OUTPATIENT
Start: 2017-09-07 | End: 2018-02-23 | Stop reason: SDUPTHER

## 2017-10-17 ENCOUNTER — TELEPHONE (OUTPATIENT)
Dept: FAMILY MEDICINE CLINIC | Age: 62
End: 2017-10-17

## 2017-10-17 DIAGNOSIS — E11.65 TYPE 2 DIABETES MELLITUS WITH HYPERGLYCEMIA, WITH LONG-TERM CURRENT USE OF INSULIN (HCC): Primary | ICD-10-CM

## 2017-10-17 DIAGNOSIS — Z79.4 TYPE 2 DIABETES MELLITUS WITH HYPERGLYCEMIA, WITH LONG-TERM CURRENT USE OF INSULIN (HCC): Primary | ICD-10-CM

## 2017-10-17 NOTE — TELEPHONE ENCOUNTER
Glory West calling from [employer] states needs referral for pt appt. Please do referral for Dr Wendy Cao for [facility] 11/8/17 for dx diabetic eye exam.   Referral sheet completed and put in folder.

## 2017-12-04 RX ORDER — PANTOPRAZOLE SODIUM 40 MG/1
TABLET, DELAYED RELEASE ORAL
Qty: 30 TAB | Refills: 5 | Status: SHIPPED | OUTPATIENT
Start: 2017-12-04 | End: 2018-02-23 | Stop reason: SDUPTHER

## 2017-12-06 DIAGNOSIS — E11.9 TYPE 2 DIABETES MELLITUS WITHOUT COMPLICATION, WITHOUT LONG-TERM CURRENT USE OF INSULIN (HCC): ICD-10-CM

## 2017-12-06 RX ORDER — METFORMIN HYDROCHLORIDE 500 MG/1
TABLET ORAL
Qty: 60 TAB | Refills: 5 | Status: SHIPPED | OUTPATIENT
Start: 2017-12-06 | End: 2018-02-23 | Stop reason: SDUPTHER

## 2018-01-22 RX ORDER — ALLOPURINOL 300 MG/1
TABLET ORAL
Qty: 30 TAB | Refills: 5 | Status: SHIPPED | OUTPATIENT
Start: 2018-01-22 | End: 2018-02-23 | Stop reason: SDUPTHER

## 2018-02-23 DIAGNOSIS — E78.00 HYPERCHOLESTEROLEMIA: ICD-10-CM

## 2018-02-23 DIAGNOSIS — E11.9 TYPE 2 DIABETES MELLITUS WITHOUT COMPLICATION, WITHOUT LONG-TERM CURRENT USE OF INSULIN (HCC): ICD-10-CM

## 2018-02-23 RX ORDER — PANTOPRAZOLE SODIUM 40 MG/1
TABLET, DELAYED RELEASE ORAL
Qty: 90 TAB | Refills: 3 | Status: SHIPPED | OUTPATIENT
Start: 2018-02-23 | End: 2019-03-31 | Stop reason: SDUPTHER

## 2018-02-23 RX ORDER — SIMVASTATIN 40 MG/1
TABLET, FILM COATED ORAL
Qty: 90 TAB | Refills: 3 | Status: SHIPPED | OUTPATIENT
Start: 2018-02-23 | End: 2019-03-09 | Stop reason: SDUPTHER

## 2018-02-23 RX ORDER — METFORMIN HYDROCHLORIDE 500 MG/1
TABLET ORAL
Qty: 180 TAB | Refills: 3 | Status: SHIPPED | OUTPATIENT
Start: 2018-02-23 | End: 2018-09-27 | Stop reason: SDUPTHER

## 2018-02-23 RX ORDER — ALLOPURINOL 300 MG/1
TABLET ORAL
Qty: 90 TAB | Refills: 3 | Status: SHIPPED | OUTPATIENT
Start: 2018-02-23 | End: 2019-03-09 | Stop reason: SDUPTHER

## 2018-02-24 DIAGNOSIS — F32.A DEPRESSION, UNSPECIFIED DEPRESSION TYPE: ICD-10-CM

## 2018-02-26 RX ORDER — PAROXETINE 30 MG/1
TABLET, FILM COATED ORAL
Qty: 30 TAB | Refills: 5 | Status: SHIPPED | OUTPATIENT
Start: 2018-02-26 | End: 2018-08-28 | Stop reason: SDUPTHER

## 2018-02-27 RX ORDER — LISINOPRIL AND HYDROCHLOROTHIAZIDE 12.5; 2 MG/1; MG/1
TABLET ORAL
Qty: 180 TAB | Refills: 3 | Status: SHIPPED | OUTPATIENT
Start: 2018-02-27 | End: 2019-03-09 | Stop reason: SDUPTHER

## 2018-08-28 ENCOUNTER — TELEPHONE (OUTPATIENT)
Dept: FAMILY MEDICINE CLINIC | Age: 63
End: 2018-08-28

## 2018-08-28 DIAGNOSIS — F32.A DEPRESSION, UNSPECIFIED DEPRESSION TYPE: ICD-10-CM

## 2018-08-28 RX ORDER — PAROXETINE 30 MG/1
TABLET, FILM COATED ORAL
Qty: 30 TAB | Refills: 0 | Status: SHIPPED | OUTPATIENT
Start: 2018-08-28 | End: 2018-09-07 | Stop reason: SDUPTHER

## 2018-08-28 NOTE — TELEPHONE ENCOUNTER
30 days of paxil sent in. She has an appt scheduled. Does she need any other refills to hold her over?

## 2018-08-28 NOTE — TELEPHONE ENCOUNTER
Pt called again requesting a refill on Rx  Paxil 30 mg. She states that she just needs a weeks worth to get her through until she has her appt with Dr. Henry Rolon on 9/7/18. I did inform pt of [facility] earlier note that pt needed an appt before any prescriptions would be refilled, pt states that this is important , and she is a good patient and always comes in and there is no reason why he can not give her this amount of medication until her appt. Please send to  [address] on file.

## 2018-08-28 NOTE — TELEPHONE ENCOUNTER
Left message on voicemail to call office back. Have attempted to contact pt x2, will await for pt to contact office or pharmacy to notify pt rx is ready for .

## 2018-09-07 ENCOUNTER — OFFICE VISIT (OUTPATIENT)
Dept: FAMILY MEDICINE CLINIC | Age: 63
End: 2018-09-07

## 2018-09-07 VITALS
WEIGHT: 173.8 LBS | OXYGEN SATURATION: 94 % | RESPIRATION RATE: 14 BRPM | HEIGHT: 62 IN | SYSTOLIC BLOOD PRESSURE: 92 MMHG | DIASTOLIC BLOOD PRESSURE: 62 MMHG | TEMPERATURE: 98.2 F | BODY MASS INDEX: 31.98 KG/M2 | HEART RATE: 92 BPM

## 2018-09-07 DIAGNOSIS — E11.9 TYPE 2 DIABETES MELLITUS WITHOUT COMPLICATION, WITHOUT LONG-TERM CURRENT USE OF INSULIN (HCC): Primary | ICD-10-CM

## 2018-09-07 DIAGNOSIS — M75.82 TENDINITIS OF LEFT ROTATOR CUFF: ICD-10-CM

## 2018-09-07 DIAGNOSIS — M19.012 ARTHRITIS OF LEFT SHOULDER REGION: ICD-10-CM

## 2018-09-07 DIAGNOSIS — F32.A DEPRESSION, UNSPECIFIED DEPRESSION TYPE: ICD-10-CM

## 2018-09-07 DIAGNOSIS — Z23 ENCOUNTER FOR IMMUNIZATION: ICD-10-CM

## 2018-09-07 LAB — GLUCOSE POC: 178 MG/DL

## 2018-09-07 RX ORDER — METHYLPREDNISOLONE ACETATE 80 MG/ML
80 INJECTION, SUSPENSION INTRA-ARTICULAR; INTRALESIONAL; INTRAMUSCULAR; SOFT TISSUE ONCE
Qty: 1 ML | Refills: 0
Start: 2018-09-07 | End: 2018-09-07

## 2018-09-07 RX ORDER — LIDOCAINE HYDROCHLORIDE 10 MG/ML
2 INJECTION INFILTRATION; PERINEURAL ONCE
Qty: 2 ML | Refills: 0
Start: 2018-09-07 | End: 2018-09-07

## 2018-09-07 RX ORDER — PAROXETINE 30 MG/1
TABLET, FILM COATED ORAL
Qty: 90 TAB | Refills: 0 | Status: SHIPPED | OUTPATIENT
Start: 2018-09-07 | End: 2018-12-28 | Stop reason: SDUPTHER

## 2018-09-07 NOTE — PROGRESS NOTES
Chief Complaint Patient presents with  Medication Refill Paroxitine 1. Have you been to the ER, urgent care clinic since your last visit? Hospitalized since your last visit? No 
 
2. Have you seen or consulted any other health care providers outside of the [facility] since your last visit? Include any pap smears or colon screening.  No

## 2018-09-07 NOTE — PROGRESS NOTES
Victoria Lou is a 59 y.o. female Chief Complaint Patient presents with  Medication Refill Paroxitine  
 pt here for recheck on her paxil and is doing very well with this currently. Pt has no se/ar. Pt also with l shoulder pain from arthritis and would like an injection states we prev did her knee and this worked very well for her. R one does bother her also but L is worse. Pt does have diabetes and has been very well controlled and kade lcheck a  Glucose first.  
 
she is a 59y.o. year old female who presents for evalution. Reviewed PmHx, RxHx, FmHx, SocHx, AllgHx and updated and dated in the chart. Review of Systems - negative except as listed above in the HPI Objective:  
 
Vitals:  
 09/07/18 1410 BP: 92/62 Pulse: 92 Resp: 14 Temp: 98.2 °F (36.8 °C) TempSrc: Oral  
SpO2: 94% Weight: 173 lb 12.8 oz (78.8 kg) Height: 5' 2\" (1.575 m) Current Outpatient Prescriptions Medication Sig  PARoxetine (PAXIL) 30 mg tablet TAKE ONE TABLET BY MOUTH ONCE DAILY  methylPREDNISolone acetate (DEPO-MEDROL) 80 mg/mL injection 1 mL by IntraMUSCular route once for 1 dose.  lidocaine (XYLOCAINE) 10 mg/mL (1 %) injection 2 mL by Intra artICUlar route once for 1 dose.  lisinopril-hydroCHLOROthiazide (PRINZIDE, ZESTORETIC) 20-12.5 mg per tablet TAKE ONE TABLET BY MOUTH TWICE DAILY  simvastatin (ZOCOR) 40 mg tablet TAKE ONE TABLET BY MOUTH NIGHTLY  allopurinol (ZYLOPRIM) 300 mg tablet TAKE ONE TABLET BY MOUTH ONCE DAILY  metFORMIN (GLUCOPHAGE) 500 mg tablet TAKE ONE TABLET BY MOUTH TWICE DAILY WITH MEALS  pantoprazole (PROTONIX) 40 mg tablet TAKE ONE TABLET BY MOUTH ONCE DAILY  Blood-Glucose Meter monitoring kit Please dispense one kit  glucose blood VI test strips (BLOOD GLUCOSE TEST) strip Use four times a day, before meals and at bedtime, to check your blood sugar.  ALPRAZolam (XANAX) 0.25 mg tablet Take 0.25 mg by mouth as needed for Anxiety.  omeprazole (PRILOSEC) 20 mg capsule Take 20 mg by mouth daily.  tiZANidine (ZANAFLEX) 4 mg tablet Take 1 Tab by mouth three (3) times daily as needed. (Patient not taking: Reported on 9/7/2018)  diclofenac EC (VOLTAREN) 75 mg EC tablet Take 1 Tab by mouth two (2) times daily as needed.  lidocaine (XYLOCAINE) 5 % ointment Apply  to affected area as needed for Pain.  Insulin Needles, Disposable, (PEN NEEDLE) 31 gauge x 3/16\" ndle QHS use of lantus  nystatin (MYCOSTATIN) 100,000 unit/mL suspension Take 1 tsp by mouth two (2) times a day. swish and spit   Indications: Not taking  PARoxetine (PAXIL) 30 mg tablet Take 30 mg by mouth every evening. No current facility-administered medications for this visit. Physical Examination: General appearance - alert, well appearing, and in no distress Chest - clear to auscultation, no wheezes, rales or rhonchi, symmetric air entry Heart - normal rate, regular rhythm, normal S1, S2, no murmurs, rubs, clicks or gallops Musculoskeletal - pos open and empty can test L shoulder pt is able to lift arm abduct up above head Assessment/ Plan:  
Diagnoses and all orders for this visit: 
 
1. Type 2 diabetes mellitus without complication, without long-term current use of insulin ([facility] [address] -     AMB POC GLUCOSE BLOOD, BY GLUCOSE MONITORING DEVICE 
170's today f/u 2 weeks for labs 2. Arthritis of left shoulder region 
-     methylPREDNISolone acetate (DEPO-MEDROL) 80 mg/mL injection; 1 mL by IntraMUSCular route once for 1 dose. -     lidocaine (XYLOCAINE) 10 mg/mL (1 %) injection; 2 mL by Intra artICUlar route once for 1 dose. -     [address] - DRAIN/INJECT LARGE JOINT/BURSA 3. Depression, unspecified depression type -     PARoxetine (PAXIL) 30 mg tablet; TAKE ONE TABLET BY MOUTH ONCE DAILY 4. Encounter for immunization -     Influenza Vaccine Inactivated (IIV)(FLUAD), Subunit, Adjuvanted, IM, (84495) -     Administration fee () for Medicare insured patients 5. Tendinitis of left rotator cuff 
-     methylPREDNISolone acetate (DEPO-MEDROL) 80 mg/mL injection; 1 mL by IntraMUSCular route once for 1 dose. -     lidocaine (XYLOCAINE) 10 mg/mL (1 %) injection; 2 mL by Intra artICUlar route once for 1 dose. -     20610 - DRAIN/INJECT LARGE JOINT/BURSA pt will f/u in 2 weeks for fasting labs and diabetes and irina check along richar her medicare wellness visit Follow-up Disposition: 
Return in about 2 weeks (around 9/21/2018). I have discussed the diagnosis with the patient and the intended plan as seen in the above orders. The patient has received an after-visit summary and questions were answered concerning future plans. Pt conveyed understanding of plan. Medication Side Effects and Warnings were discussed with patient [address], DO Patient has agreed to the procedure and understands the risk of adverse reactions. Procedure:  Joint Injection:  Left shoulder Injected joint(s) with sterile technique using 3cc of mixed 1% Lidocaine 2cc with DepoMedrol 80/ml 1cc with relief and no adverse reaction to procedure. Patient given instructions and expectations of after visit care. [facility] 
OFFICE PROCEDURE PROGRESS NOTE Chart reviewed for the following: 
 I, [address]iden[address], DO, have reviewed the History, Physical and updated the Allergic reactions. TIME OUT performed immediately prior to start of procedure: 
 Papa ANTHONY DO, have performed the following reviews prior to the start of the procedure: 
         
* Patient was identified by name and date of birth * Agreement on procedure being performed was verified * Risks and Benefits explained to the patient * Procedure site verified and marked as necessary * Patient was positioned for comfort * Consent was signed and verified Time: 810J Date of procedure: 5/18/2017 Procedure performed by:  Durga Stalilngs DO 
 
Provider assisted by: self DO Patient assisted by: self How tolerated by patient: tolerated the procedure well with no complications Post Procedural Pain Scale: 0 - No Hurt Comments: none Discussed the patient's BMI with her. The BMI follow up plan is as follows:  
 
dietary management education, guidance, and counseling 
encourage exercise 
monitor weight 
prescribed dietary intake An After Visit Summary was printed and given to the patient.

## 2018-09-07 NOTE — PATIENT INSTRUCTIONS
Body Mass Index: Care Instructions Your Care Instructions Body mass index (BMI) can help you see if your weight is raising your risk for health problems. It uses a formula to compare how much you weigh with how tall you are. · A BMI lower than 18.5 is considered underweight. · A BMI between 18.5 and 24.9 is considered healthy. · A BMI between 25 and 29.9 is considered overweight. A BMI of 30 or higher is considered obese. If your BMI is in the normal range, it means that you have a lower risk for weight-related health problems. If your BMI is in the overweight or obese range, you may be at increased risk for weight-related health problems, such as high blood pressure, heart disease, stroke, arthritis or joint pain, and diabetes. If your BMI is in the underweight range, you may be at increased risk for health problems such as fatigue, lower protection (immunity) against illness, muscle loss, bone loss, hair loss, and hormone problems. BMI is just one measure of your risk for weight-related health problems. You may be at higher risk for health problems if you are not active, you eat an unhealthy diet, or you drink too much alcohol or use tobacco products. Follow-up care is a key part of your treatment and safety. Be sure to make and go to all appointments, and call your doctor if you are having problems. It's also a good idea to know your test results and keep a list of the medicines you take. How can you care for yourself at home? · Practice healthy eating habits. This includes eating plenty of fruits, vegetables, whole grains, lean protein, and low-fat dairy. · If your doctor recommends it, get more exercise. Walking is a good choice. Bit by bit, increase the amount you walk every day. Try for at least 30 minutes on most days of the week. · Do not smoke. Smoking can increase your risk for health problems.  If you need help quitting, talk to your doctor about stop-smoking programs and medicines. These can increase your chances of quitting for good. · Limit alcohol to 2 drinks a day for men and 1 drink a day for women. Too much alcohol can cause health problems. If you have a BMI higher than 25 · Your doctor may do other tests to check your risk for weight-related health problems. This may include measuring the distance around your waist. A waist measurement of more than 40 inches in men or 35 inches in women can increase the risk of weight-related health problems. · Talk with your doctor about steps you can take to stay healthy or improve your health. You may need to make lifestyle changes to lose weight and stay healthy, such as changing your diet and getting regular exercise. If you have a BMI lower than 18.5 · Your doctor may do other tests to check your risk for health problems. · Talk with your doctor about steps you can take to stay healthy or improve your health. You may need to make lifestyle changes to gain or maintain weight and stay healthy, such as getting more healthy foods in your diet and doing exercises to build muscle. Where can you learn more? Go to http://negro-ryder.info/. Enter S176 in the search box to learn more about \"Body Mass Index: Care Instructions. \" Current as of: October 13, 2016 Content Version: 11.4 © 5037-5822 [employer]. Care instructions adapted under license by [employer] (which disclaims liability or warranty for this information). If you have questions about a medical condition or this instruction, always ask your healthcare professional. [address] any warranty or liability for your use of this information.

## 2018-09-17 DIAGNOSIS — Z12.39 SCREENING FOR MALIGNANT NEOPLASM OF BREAST: Primary | ICD-10-CM

## 2018-09-17 NOTE — LETTER
September 17, 2018 [address] [id number] Dear Gagan Anderson, Your doctor is interested in not only helping you feel better when you are sick, but also in keeping you from getting sick in the first place. In the spirit of maintaining your good health, our  
records indicate that you may be due for the following: 
 
Health Maintenance Due Topic Date Due  Pneumococcal Vaccine (1 of 1 - PPSV23) 09/01/1975  Cervical Cancer Screening  09/01/1977  Breast Cancer Screening  09/01/2006  Stool testing for trace blood  09/01/2006  Shingles Vaccine  07/01/2016 [facility] Diabetic Foot Care  11/03/2016 [facility] Eye Exam  11/06/2016  Hemoglobin A1C    12/06/2017  Albumin Urine Test  06/06/2018  Cholesterol Test   06/06/2018 [facility] Annual Well Visit  06/07/2018 Please contact our office to make an appointment at your convenience, either by phone at [phone] or via [address] If you are not due for any of the preventive services listed above please notify us so we can update your records. We look forward to hearing from you soon. Sincerely, Kristian Velez 
[phone]

## 2018-09-18 ENCOUNTER — DOCUMENTATION ONLY (OUTPATIENT)
Dept: FAMILY MEDICINE CLINIC | Age: 63
End: 2018-09-18

## 2018-09-21 ENCOUNTER — OFFICE VISIT (OUTPATIENT)
Dept: FAMILY MEDICINE CLINIC | Age: 63
End: 2018-09-21

## 2018-09-21 VITALS
HEIGHT: 62 IN | TEMPERATURE: 98 F | SYSTOLIC BLOOD PRESSURE: 111 MMHG | RESPIRATION RATE: 18 BRPM | WEIGHT: 175 LBS | BODY MASS INDEX: 32.2 KG/M2 | OXYGEN SATURATION: 97 % | DIASTOLIC BLOOD PRESSURE: 75 MMHG | HEART RATE: 81 BPM

## 2018-09-21 DIAGNOSIS — I10 ESSENTIAL HYPERTENSION: ICD-10-CM

## 2018-09-21 DIAGNOSIS — Z23 ENCOUNTER FOR IMMUNIZATION: ICD-10-CM

## 2018-09-21 DIAGNOSIS — E11.9 TYPE 2 DIABETES MELLITUS WITHOUT COMPLICATION, WITHOUT LONG-TERM CURRENT USE OF INSULIN (HCC): Primary | ICD-10-CM

## 2018-09-21 DIAGNOSIS — Z12.31 ENCOUNTER FOR SCREENING MAMMOGRAM FOR MALIGNANT NEOPLASM OF BREAST: ICD-10-CM

## 2018-09-21 DIAGNOSIS — E78.00 HYPERCHOLESTEROLEMIA: ICD-10-CM

## 2018-09-21 DIAGNOSIS — Z12.11 COLON CANCER SCREENING: ICD-10-CM

## 2018-09-21 NOTE — PROGRESS NOTES
Chief Complaint Patient presents with  Labs  
  is fasting 1. Have you been to the ER, urgent care clinic since your last visit? Hospitalized since your last visit? No 
 
2. Have you seen or consulted any other health care providers outside of the [address] [place] [address] since your last visit? Include any pap smears or colon screening.  No

## 2018-09-21 NOTE — PATIENT INSTRUCTIONS
Medicare Wellness Visit, Female The best way to live healthy is to have a lifestyle where you eat a well-balanced diet, exercise regularly, limit alcohol use, and quit all forms of tobacco/nicotine, if applicable. Regular preventive services are another way to keep healthy. Preventive services (vaccines, screening tests, monitoring & exams) can help personalize your care plan, which helps you manage your own care. Screening tests can find health problems at the earliest stages, when they are easiest to treat. [facility] follows the current, evidence-based guidelines published by the [facility] ([facility]) when recommending preventive services for our patients. Because we follow these guidelines, sometimes recommendations change over time as research supports it. (For example, mammograms used to be recommended annually. Even though Medicare will still pay for an annual mammogram, the newer guidelines recommend a mammogram every two years for women of average risk.) Of course, you and your doctor may decide to screen more often for some diseases, based on your risk and your health status. Preventive services for you include: - Medicare offers their members a free annual wellness visit, which is time for you and your primary care provider to discuss and plan for your preventive service needs. Take advantage of this benefit every year! 
-All adults over the age of 72 should receive the recommended pneumonia vaccines. Current USPSTF guidelines recommend a series of two vaccines for the best pneumonia protection.  
-All adults should have a flu vaccine yearly and a tetanus vaccine every 10 years. All adults age 61 and older should receive a shingles vaccine once in their lifetime.   
-A bone mass density test is recommended when a woman turns 65 to screen for osteoporosis. This test is only recommended one time, as a screening. Some providers will use this same test as a disease monitoring tool if you already have osteoporosis. -All adults age 38-68 who are overweight should have a diabetes screening test once every three years.  
-Other screening tests and preventive services for persons with diabetes include: an eye exam to screen for diabetic retinopathy, a kidney function test, a foot exam, and stricter control over your cholesterol.  
-Cardiovascular screening for adults with routine risk involves an electrocardiogram (ECG) at intervals determined by your doctor.  
-Colorectal cancer screenings should be done for adults age 54-65 with no increased risk factors for colorectal cancer. There are a number of acceptable methods of screening for this type of cancer. Each test has its own benefits and drawbacks. Discuss with your doctor what is most appropriate for you during your annual wellness visit. The different tests include: colonoscopy (considered the best screening method), a fecal occult blood test, a fecal DNA test, and sigmoidoscopy. -Breast cancer screenings are recommended every other year for women of normal risk, age 54-69. 
-Cervical cancer screenings for women over age 72 are only recommended with certain risk factors.  
-All adults born between [facility] should be screened once for Hepatitis C. Here is a list of your current Health Maintenance items (your personalized list of preventive services) with a due date: 
Health Maintenance Due Topic Date Due  Pneumococcal Vaccine (1 of 1 - PPSV23) 09/01/1975  Cervical Cancer Screening  09/01/1977  Breast Cancer Screening  09/01/2006  Stool testing for trace blood  09/01/2006  Shingles Vaccine  07/01/2016 [address] [place] [address] Diabetic Foot Care  11/03/2016 [address] [place] [address] Eye Exam  11/06/2016  Hemoglobin A1C    12/06/2017  Albumin Urine Test  06/06/2018  Cholesterol Test   06/06/2018 [address] [place] [address] Annual Well Visit  06/07/2018

## 2018-09-21 NOTE — MR AVS SNAPSHOT
[address] [address] 
[phone] Patient: Helio Tellez 
MRN: [id number] AUC:3/6/8687 Visit Information Date & Time Provider Department Dept. Phone Encounter #  
 9/21/2018  7:00 AM Marina FungNaida [phone] [id number] Follow-up Instructions Return in about 6 months (around 3/21/2019), or if symptoms worsen or fail to improve. Upcoming Health Maintenance Date Due Pneumococcal 19-64 Medium Risk (1 of 1 - PPSV23) 9/1/1975 PAP AKA CERVICAL CYTOLOGY 9/1/1977 BREAST CANCER SCRN MAMMOGRAM 9/1/2006 FOBT Q 1 YEAR AGE 50-75 9/1/2006 ZOSTER VACCINE AGE 60> 7/1/2016 FOOT EXAM Q1 11/3/2016 EYE EXAM RETINAL OR DILATED Q1 11/6/2016 HEMOGLOBIN A1C Q6M 12/6/2017 MICROALBUMIN Q1 6/6/2018 LIPID PANEL Q1 6/6/2018 MEDICARE YEARLY EXAM 6/7/2018 DTaP/Tdap/Td series (2 - Td) 10/14/2025 Allergies as of 9/21/2018  Review Complete On: 9/21/2018 By: Marina Fung, DO Severity Noted Reaction Type Reactions Sulfa (Sulfonamide Antibiotics)  10/09/2015   Intolerance Other (comments) Emesis Current Immunizations  Reviewed on 11/11/2016 Name Date Influenza Vaccine (Madin [place] Canine Kidney) PF 10/10/2015  6:45 PM  
 Influenza Vaccine (Quad) PF 11/11/2016 Influenza Vaccine (Tri) Adjuvanted 9/7/2018  3:28 PM  
 Pneumococcal Polysaccharide (PPSV-23)  Incomplete Tdap 10/14/2015 Not reviewed this visit You Were Diagnosed With   
  
 Codes Comments Type 2 diabetes mellitus without complication, without long-term current use of insulin (HCC)    -  Primary ICD-10-CM: E11.9 ICD-9-CM: 250.00 Essential hypertension     ICD-10-CM: I10 
ICD-9-CM: 401.9 Hypercholesterolemia     ICD-10-CM: E78.00 ICD-9-CM: 272.0  Encounter for screening mammogram for malignant neoplasm of breast     ICD-10-CM: Z12.31 
ICD-9-CM: V76.12   
 Encounter for immunization     ICD-10-CM: U88 ICD-9-CM: V03.89 Colon cancer screening     ICD-10-CM: Z12.11 ICD-9-CM: V76.51 Vitals BP Pulse Temp Resp Height(growth percentile) Weight(growth percentile) 111/75 81 98 °F (36.7 °C) 18 5' 2\" (1.575 m) 175 lb (79.4 kg) SpO2 BMI OB Status Smoking Status 97% 32.01 kg/m2 Postmenopausal Never Smoker Vitals History BMI and BSA Data Body Mass Index Body Surface Area 32.01 kg/m 2 1.86 m 2 Preferred Pharmacy Pharmacy Name Phone [address] [facility], [address] [facility] [phone] Your Updated Medication List  
  
   
This list is accurate as of 9/21/18  7:39 AM.  Always use your most recent med list.  
  
  
  
  
 allopurinol 300 mg tablet Commonly known as:  ZYLOPRIM  
TAKE ONE TABLET BY MOUTH ONCE DAILY ALPRAZolam 0.25 mg tablet Commonly known as:  Star Carp Take 0.25 mg by mouth as needed for Anxiety. Blood-Glucose Meter monitoring kit Please dispense one kit  
  
 glucose blood VI test strips strip Commonly known as:  blood glucose test  
Use four times a day, before meals and at bedtime, to check your blood sugar. Insulin Needles (Disposable) 31 gauge x 3/16\" Ndle Commonly known as:  PEN NEEDLE  
QHS use of lantus  
  
 lisinopril-hydroCHLOROthiazide 20-12.5 mg per tablet Commonly known as:  PRINZIDE, ZESTORETIC  
TAKE ONE TABLET BY MOUTH TWICE DAILY  
  
 metFORMIN 500 mg tablet Commonly known as:  GLUCOPHAGE  
TAKE ONE TABLET BY MOUTH TWICE DAILY WITH MEALS  
  
 omeprazole 20 mg capsule Commonly known as:  PRILOSEC Take 20 mg by mouth daily. pantoprazole 40 mg tablet Commonly known as:  PROTONIX  
TAKE ONE TABLET BY MOUTH ONCE DAILY PARoxetine 30 mg tablet Commonly known as:  PAXIL TAKE ONE TABLET BY MOUTH ONCE DAILY  
  
 simvastatin 40 mg tablet Commonly known as:  ZOCOR  
TAKE ONE TABLET BY MOUTH NIGHTLY We Performed the Following ADMIN INFLUENZA VIRUS VAC [ [facility]] COLOGUARD TEST (FECAL DNA COLORECTAL CANCER SCREENING) [07617 CPT(R)] HEMOGLOBIN A1C WITH EAG [53692 CPT(R)]  DIABETES FOOT EXAM [[facility]] LIPID PANEL [00375 CPT(R)] METABOLIC PANEL, COMPREHENSIVE [86703 CPT(R)] MICROALBUMIN, UR, RAND W/ MICROALB/CREAT RATIO P6108172 CPT(R)] PNEUMOCOCCAL POLYSACCHARIDE VACCINE, 23-VALENT, ADULT OR IMMUNOSUPPRESSED PT DOSE, [10167 CPT(R)] Follow-up Instructions Return in about 6 months (around 3/21/2019), or if symptoms worsen or fail to improve. To-Do List   
 09/24/2018 Imaging:  PATY MAMMO BI SCREENING INCL CAD Patient Instructions Medicare Wellness Visit, Female The best way to live healthy is to have a lifestyle where you eat a well-balanced diet, exercise regularly, limit alcohol use, and quit all forms of tobacco/nicotine, if applicable. Regular preventive services are another way to keep healthy. Preventive services (vaccines, screening tests, monitoring & exams) can help personalize your care plan, which helps you manage your own care. Screening tests can find health problems at the earliest stages, when they are easiest to treat. [facility] follows the current, evidence-based guidelines published by the [facility] [place] (USPSTF) when recommending preventive services for our patients. Because we follow these guidelines, sometimes recommendations change over time as research supports it. (For example, mammograms used to be recommended annually. Even though Medicare will still pay for an annual mammogram, the newer guidelines recommend a mammogram every two years for women of average risk.) Of course, you and your doctor may decide to screen more often for some diseases, based on your risk and your health status. Preventive services for you include: - Medicare offers their members a free annual wellness visit, which is time for you and your primary care provider to discuss and plan for your preventive service needs. Take advantage of this benefit every year! 
-All adults over the age of 72 should receive the recommended pneumonia vaccines. Current USPSTF guidelines recommend a series of two vaccines for the best pneumonia protection.  
-All adults should have a flu vaccine yearly and a tetanus vaccine every 10 years. All adults age 61 and older should receive a shingles vaccine once in their lifetime.   
-A bone mass density test is recommended when a woman turns 65 to screen for osteoporosis. This test is only recommended one time, as a screening. Some providers will use this same test as a disease monitoring tool if you already have osteoporosis. -All adults age 38-68 who are overweight should have a diabetes screening test once every three years.  
-Other screening tests and preventive services for persons with diabetes include: an eye exam to screen for diabetic retinopathy, a kidney function test, a foot exam, and stricter control over your cholesterol.  
-Cardiovascular screening for adults with routine risk involves an electrocardiogram (ECG) at intervals determined by your doctor.  
-Colorectal cancer screenings should be done for adults age 54-65 with no increased risk factors for colorectal cancer. There are a number of acceptable methods of screening for this type of cancer. Each test has its own benefits and drawbacks. Discuss with your doctor what is most appropriate for you during your annual wellness visit. The different tests include: colonoscopy (considered the best screening method), a fecal occult blood test, a fecal DNA test, and sigmoidoscopy. -Breast cancer screenings are recommended every other year for women of normal risk, age 54-69. 
-Cervical cancer screenings for women over age 72 are only recommended with certain risk factors. -All adults born between [facility] should be screened once for Hepatitis C. Here is a list of your current Health Maintenance items (your personalized list of preventive services) with a due date: 
Health Maintenance Due Topic Date Due  Pneumococcal Vaccine (1 of 1 - PPSV23) 09/01/1975  Cervical Cancer Screening  09/01/1977  Breast Cancer Screening  09/01/2006  Stool testing for trace blood  09/01/2006  Shingles Vaccine  07/01/2016 [facility] Diabetic Foot Care  11/03/2016 [facility] Eye Exam  11/06/2016  Hemoglobin A1C    12/06/2017  Albumin Urine Test  06/06/2018  Cholesterol Test   06/06/2018 [facility] Annual Well Visit  06/07/2018 Introducing [facility]! [employer] introduces Fuzz patient portal. Now you can access parts of your medical record, email your doctor's office, and request medication refills online. 1. In your internet browser, go to [email or web address] 2. Click on the First Time User? Click Here link in the Sign In box. You will see the New Member Sign Up page. 3. Enter your Fuzz Access Code exactly as it appears below. You will not need to use this code after youve completed the sign-up process. If you do not sign up before the expiration date, you must request a new code. · Fuzz Access Code: [email or web address] Expires: 12/6/2018  2:04 PM 
 
4. Enter the last four digits of your Social Security Number (xxxx) and Date of Birth (mm/dd/yyyy) as indicated and click Submit. You will be taken to the next sign-up page. 5. Create a HCDCt ID. This will be your Fuzz login ID and cannot be changed, so think of one that is secure and easy to remember. 6. Create a Fuzz password. You can change your password at any time. 7. Enter your Password Reset Question and Answer. This can be used at a later time if you forget your password. 8. Enter your e-mail address.  You will receive e-mail notification when new information is available in Aditive. 9. Click Sign Up. You can now view and download portions of your medical record. 10. Click the Download Summary menu link to download a portable copy of your medical information. If you have questions, please visit the Frequently Asked Questions section of the Aditive website. Remember, Aditive is NOT to be used for urgent needs. For medical emergencies, dial 911. Now available from your iPhone and Android! Please provide this summary of care documentation to your next provider. Your primary care clinician is listed as Zuleyma Montelongo. If you have any questions after today's visit, please call [phone].

## 2018-09-23 LAB
ALBUMIN SERPL-MCNC: 4.2 G/DL (ref 3.6–4.8)
ALBUMIN/CREAT UR: 3.9 MG/G CREAT (ref 0–30)
ALBUMIN/GLOB SERPL: 2.1 {RATIO} (ref 1.2–2.2)
ALP SERPL-CCNC: 71 IU/L (ref 39–117)
ALT SERPL-CCNC: 19 IU/L (ref 0–32)
AST SERPL-CCNC: 18 IU/L (ref 0–40)
BILIRUB SERPL-MCNC: 0.3 MG/DL (ref 0–1.2)
BUN SERPL-MCNC: 26 MG/DL (ref 8–27)
BUN/CREAT SERPL: 25 (ref 12–28)
CALCIUM SERPL-MCNC: 9.8 MG/DL (ref 8.7–10.3)
CHLORIDE SERPL-SCNC: 99 MMOL/L (ref 96–106)
CHOLEST SERPL-MCNC: 188 MG/DL (ref 100–199)
CO2 SERPL-SCNC: 23 MMOL/L (ref 20–29)
CREAT SERPL-MCNC: 1.05 MG/DL (ref 0.57–1)
CREAT UR-MCNC: 151.8 MG/DL
EST. AVERAGE GLUCOSE BLD GHB EST-MCNC: 200 MG/DL
GLOBULIN SER CALC-MCNC: 2 G/DL (ref 1.5–4.5)
GLUCOSE SERPL-MCNC: 159 MG/DL (ref 65–99)
HBA1C MFR BLD: 8.6 % (ref 4.8–5.6)
HDLC SERPL-MCNC: 71 MG/DL
INTERPRETATION, 910389: NORMAL
INTERPRETATION: NORMAL
LDLC SERPL CALC-MCNC: 88 MG/DL (ref 0–99)
Lab: NORMAL
MICROALBUMIN UR-MCNC: 5.9 UG/ML
PDF IMAGE, 910387: NORMAL
POTASSIUM SERPL-SCNC: 4.8 MMOL/L (ref 3.5–5.2)
PROT SERPL-MCNC: 6.2 G/DL (ref 6–8.5)
SODIUM SERPL-SCNC: 139 MMOL/L (ref 134–144)
TRIGL SERPL-MCNC: 145 MG/DL (ref 0–149)
VLDLC SERPL CALC-MCNC: 29 MG/DL (ref 5–40)

## 2018-09-25 NOTE — PROGRESS NOTES
Diabetes has significantly worsened now at 8.6 from 5.5 would like to increase metformin to 1000 bid and give pt 3 months to get this number down and will need to recheck in 3 months. Will send in new dose if pt agreeable.

## 2018-09-27 DIAGNOSIS — E11.9 TYPE 2 DIABETES MELLITUS WITHOUT COMPLICATION, WITHOUT LONG-TERM CURRENT USE OF INSULIN (HCC): ICD-10-CM

## 2018-09-27 RX ORDER — METFORMIN HYDROCHLORIDE 1000 MG/1
TABLET ORAL
Qty: 180 TAB | Refills: 3 | Status: SHIPPED | OUTPATIENT
Start: 2018-09-27 | End: 2019-10-06 | Stop reason: SDUPTHER

## 2018-10-04 ENCOUNTER — DOCUMENTATION ONLY (OUTPATIENT)
Dept: FAMILY MEDICINE CLINIC | Age: 63
End: 2018-10-04

## 2018-12-13 ENCOUNTER — DOCUMENTATION ONLY (OUTPATIENT)
Dept: FAMILY MEDICINE CLINIC | Age: 63
End: 2018-12-13

## 2018-12-20 ENCOUNTER — DOCUMENTATION ONLY (OUTPATIENT)
Dept: FAMILY MEDICINE CLINIC | Age: 63
End: 2018-12-20

## 2018-12-28 DIAGNOSIS — F32.A DEPRESSION, UNSPECIFIED DEPRESSION TYPE: ICD-10-CM

## 2018-12-31 RX ORDER — PAROXETINE 30 MG/1
TABLET, FILM COATED ORAL
Qty: 90 TAB | Refills: 0 | Status: SHIPPED | OUTPATIENT
Start: 2018-12-31 | End: 2019-03-31 | Stop reason: SDUPTHER

## 2019-03-09 DIAGNOSIS — E78.00 HYPERCHOLESTEROLEMIA: ICD-10-CM

## 2019-03-11 RX ORDER — SIMVASTATIN 40 MG/1
TABLET, FILM COATED ORAL
Qty: 90 TAB | Refills: 3 | Status: SHIPPED | OUTPATIENT
Start: 2019-03-11 | End: 2020-03-17

## 2019-03-11 RX ORDER — LISINOPRIL AND HYDROCHLOROTHIAZIDE 12.5; 2 MG/1; MG/1
TABLET ORAL
Qty: 180 TAB | Refills: 3 | Status: SHIPPED | OUTPATIENT
Start: 2019-03-11 | End: 2020-03-23

## 2019-03-11 RX ORDER — ALLOPURINOL 300 MG/1
TABLET ORAL
Qty: 90 TAB | Refills: 3 | Status: SHIPPED | OUTPATIENT
Start: 2019-03-11 | End: 2020-03-23

## 2019-03-31 DIAGNOSIS — F32.A DEPRESSION, UNSPECIFIED DEPRESSION TYPE: ICD-10-CM

## 2019-04-01 RX ORDER — PANTOPRAZOLE SODIUM 40 MG/1
TABLET, DELAYED RELEASE ORAL
Qty: 90 TAB | Refills: 3 | Status: SHIPPED | OUTPATIENT
Start: 2019-04-01 | End: 2020-04-07

## 2019-04-01 RX ORDER — PAROXETINE 30 MG/1
TABLET, FILM COATED ORAL
Qty: 90 TAB | Refills: 0 | Status: SHIPPED | OUTPATIENT
Start: 2019-04-01 | End: 2019-06-30 | Stop reason: SDUPTHER

## 2019-06-30 DIAGNOSIS — F32.A DEPRESSION, UNSPECIFIED DEPRESSION TYPE: ICD-10-CM

## 2019-07-01 RX ORDER — PAROXETINE 30 MG/1
TABLET, FILM COATED ORAL
Qty: 90 TAB | Refills: 0 | Status: SHIPPED | OUTPATIENT
Start: 2019-07-01 | End: 2019-10-06 | Stop reason: SDUPTHER

## 2019-10-06 DIAGNOSIS — F32.A DEPRESSION, UNSPECIFIED DEPRESSION TYPE: ICD-10-CM

## 2019-10-06 DIAGNOSIS — E11.9 TYPE 2 DIABETES MELLITUS WITHOUT COMPLICATION, WITHOUT LONG-TERM CURRENT USE OF INSULIN (HCC): ICD-10-CM

## 2019-10-08 RX ORDER — PAROXETINE 30 MG/1
TABLET, FILM COATED ORAL
Qty: 90 TAB | Refills: 0 | Status: SHIPPED | OUTPATIENT
Start: 2019-10-08 | End: 2019-12-31

## 2019-10-08 RX ORDER — METFORMIN HYDROCHLORIDE 1000 MG/1
TABLET ORAL
Qty: 180 TAB | Refills: 3 | Status: SHIPPED | OUTPATIENT
Start: 2019-10-08 | End: 2020-12-01

## 2019-10-29 ENCOUNTER — OFFICE VISIT (OUTPATIENT)
Dept: FAMILY MEDICINE CLINIC | Age: 64
End: 2019-10-29

## 2019-10-29 VITALS
WEIGHT: 174 LBS | DIASTOLIC BLOOD PRESSURE: 78 MMHG | OXYGEN SATURATION: 97 % | TEMPERATURE: 98.4 F | HEIGHT: 62 IN | HEART RATE: 99 BPM | RESPIRATION RATE: 16 BRPM | BODY MASS INDEX: 32.02 KG/M2 | SYSTOLIC BLOOD PRESSURE: 109 MMHG

## 2019-10-29 DIAGNOSIS — E11.9 TYPE 2 DIABETES MELLITUS WITHOUT COMPLICATION, WITHOUT LONG-TERM CURRENT USE OF INSULIN (HCC): Primary | ICD-10-CM

## 2019-10-29 DIAGNOSIS — Z13.39 SCREENING FOR ALCOHOLISM: ICD-10-CM

## 2019-10-29 DIAGNOSIS — E78.00 HYPERCHOLESTEROLEMIA: ICD-10-CM

## 2019-10-29 DIAGNOSIS — I10 ESSENTIAL HYPERTENSION: ICD-10-CM

## 2019-10-29 DIAGNOSIS — Z23 ENCOUNTER FOR IMMUNIZATION: ICD-10-CM

## 2019-10-29 DIAGNOSIS — Z00.00 MEDICARE ANNUAL WELLNESS VISIT, SUBSEQUENT: ICD-10-CM

## 2019-10-29 LAB — HBA1C MFR BLD HPLC: 9.7 %

## 2019-10-29 RX ORDER — ACETAMINOPHEN 325 MG/1
TABLET ORAL
COMMUNITY

## 2019-10-29 RX ORDER — PIOGLITAZONEHYDROCHLORIDE 30 MG/1
30 TABLET ORAL DAILY
Qty: 30 TAB | Refills: 5 | Status: SHIPPED | OUTPATIENT
Start: 2019-10-29 | End: 2020-04-01 | Stop reason: SDUPTHER

## 2019-10-29 RX ORDER — REPAGLINIDE 1 MG/1
1 TABLET ORAL
Qty: 90 TAB | Refills: 5 | Status: SHIPPED | OUTPATIENT
Start: 2019-10-29 | End: 2019-12-18

## 2019-10-29 NOTE — PROGRESS NOTES
Chief Complaint   Patient presents with   [address]     Patient presents in office today for f/u and labs. States that over the weekend she was vomiting and has diarrhea. She feels better at this time. No other concerns. 1. Have you been to the ER, urgent care clinic since your last visit? Hospitalized since your last visit? No    2. Have you seen or consulted any other health care providers outside of the [address] [place] [address] since your last visit? Include any pap smears or colon screening.  No      Learning Assessment 2/9/2016   PRIMARY LEARNER Patient   HIGHEST LEVEL OF EDUCATION - PRIMARY LEARNER  GRADUATED HIGH SCHOOL OR GED   BARRIERS PRIMARY LEARNER NONE     -   CO-LEARNER CAREGIVER No   PRIMARY LANGUAGE ENGLISH   LEARNER PREFERENCE PRIMARY DEMONSTRATION   ANSWERED BY pt   RELATIONSHIP SELF

## 2019-10-29 NOTE — PATIENT INSTRUCTIONS
A Healthy Lifestyle: Care Instructions Your Care Instructions A healthy lifestyle can help you feel good, stay at a healthy weight, and have plenty of energy for both work and play. A healthy lifestyle is something you can share with your whole family. A healthy lifestyle also can lower your risk for serious health problems, such as high blood pressure, heart disease, and diabetes. You can follow a few steps listed below to improve your health and the health of your family. Follow-up care is a key part of your treatment and safety. Be sure to make and go to all appointments, and call your doctor if you are having problems. It's also a good idea to know your test results and keep a list of the medicines you take. How can you care for yourself at home? · Do not eat too much sugar, fat, or fast foods. You can still have dessert and treats now and then. The goal is moderation. · Start small to improve your eating habits. Pay attention to portion sizes, drink less juice and soda pop, and eat more fruits and vegetables. ? Eat a healthy amount of food. A 3-ounce serving of meat, for example, is about the size of a deck of cards. Fill the rest of your plate with vegetables and whole grains. ? Limit the amount of soda and sports drinks you have every day. Drink more water when you are thirsty. ? Eat at least 5 servings of fruits and vegetables every day. It may seem like a lot, but it is not hard to reach this goal. A serving or helping is 1 piece of fruit, 1 cup of vegetables, or 2 cups of leafy, raw vegetables. Have an apple or some carrot sticks as an afternoon snack instead of a candy bar. Try to have fruits and/or vegetables at every meal. 
· Make exercise part of your daily routine. You may want to start with simple activities, such as walking, bicycling, or slow swimming. Try to be active 30 to 60 minutes every day.  You do not need to do all 30 to 60 minutes all at once. For example, you can exercise 3 times a day for 10 or 20 minutes. Moderate exercise is safe for most people, but it is always a good idea to talk to your doctor before starting an exercise program. 
· Keep moving. Nini Garcíae the lawn, work in the garden, or ivWatch. Take the stairs instead of the elevator at work. · If you smoke, quit. People who smoke have an increased risk for heart attack, stroke, cancer, and other lung illnesses. Quitting is hard, but there are ways to boost your chance of quitting tobacco for good. ? Use nicotine gum, patches, or lozenges. ? Ask your doctor about stop-smoking programs and medicines. ? Keep trying. In addition to reducing your risk of diseases in the future, you will notice some benefits soon after you stop using tobacco. If you have shortness of breath or asthma symptoms, they will likely get better within a few weeks after you quit. · Limit how much alcohol you drink. Moderate amounts of alcohol (up to 2 drinks a day for men, 1 drink a day for women) are okay. But drinking too much can lead to liver problems, high blood pressure, and other health problems. Family health If you have a family, there are many things you can do together to improve your health. · Eat meals together as a family as often as possible. · Eat healthy foods. This includes fruits, vegetables, lean meats and dairy, and whole grains. · Include your family in your fitness plan. Most people think of activities such as jogging or tennis as the way to fitness, but there are many ways you and your family can be more active. Anything that makes you breathe hard and gets your heart pumping is exercise. Here are some tips: 
? Walk to do errands or to take your child to school or the bus. 
? Go for a family bike ride after dinner instead of watching TV. Where can you learn more? Go to http://negro-ryder.info/. Enter F357 in the search box to learn more about \"A Healthy Lifestyle: Care Instructions. \" Current as of: May 28, 2019 Content Version: 12.2 © 0434-8825 [employer]. Care instructions adapted under license by [employer] (which disclaims liability or warranty for this information). If you have questions about a medical condition or this instruction, always ask your healthcare professional. [address] any warranty or liability for your use of this information. Medicare Wellness Visit, Female The best way to live healthy is to have a lifestyle where you eat a well-balanced diet, exercise regularly, limit alcohol use, and quit all forms of tobacco/nicotine, if applicable. Regular preventive services are another way to keep healthy. Preventive services (vaccines, screening tests, monitoring & exams) can help personalize your care plan, which helps you manage your own care. Screening tests can find health problems at the earliest stages, when they are easiest to treat. [facility] follows the current, evidence-based guidelines published by the [facility] States [place] (USPSTF) when recommending preventive services for our patients. Because we follow these guidelines, sometimes recommendations change over time as research supports it. (For example, mammograms used to be recommended annually. Even though Medicare will still pay for an annual mammogram, the newer guidelines recommend a mammogram every two years for women of average risk). Of course, you and your doctor may decide to screen more often for some diseases, based on your risk and your co-morbidities (chronic disease you are already diagnosed with). Preventive services for you include: - Medicare offers their members a free annual wellness visit, which is time for you and your primary care provider to discuss and plan for your preventive service needs. Take advantage of this benefit every year! 
-All adults over the age of 72 should receive the recommended pneumonia vaccines. Current USPSTF guidelines recommend a series of two vaccines for the best pneumonia protection.  
-All adults should have a flu vaccine yearly and a tetanus vaccine every 10 years.  
-All adults age 48 and older should receive the shingles vaccines (series of two vaccines). -All adults age 38-68 who are overweight should have a diabetes screening test once every three years.  
-All adults born between [birth year] and 1964 should be screened once for Hepatitis C. 
-Other screening tests and preventive services for persons with diabetes include: an eye exam to screen for diabetic retinopathy, a kidney function test, a foot exam, and stricter control over your cholesterol.  
-Cardiovascular screening for adults with routine risk involves an electrocardiogram (ECG) at intervals determined by your doctor.  
-Colorectal cancer screenings should be done for adults age 54-65 with no increased risk factors for colorectal cancer. There are a number of acceptable methods of screening for this type of cancer. Each test has its own benefits and drawbacks. Discuss with your doctor what is most appropriate for you during your annual wellness visit. The different tests include: colonoscopy (considered the best screening method), a fecal occult blood test, a fecal DNA test, and sigmoidoscopy. 
 
-A bone mass density test is recommended when a woman turns 65 to screen for osteoporosis. This test is only recommended one time, as a screening. Some providers will use this same test as a disease monitoring tool if you already have osteoporosis. -Breast cancer screenings are recommended every other year for women of normal risk, age 54-69. 
-Cervical cancer screenings for women over age 72 are only recommended with certain risk factors. Here is a list of your current Health Maintenance items (your personalized list of preventive services) with a due date: 
Health Maintenance Due Topic Date Due  Pneumococcal Vaccine (1 of 1 - PPSV23) 09/01/1962  Pap Test  09/01/1977  Shingles Vaccine (1 of 2) 09/01/2006  Mammogram  09/01/2006  Stool testing for trace blood  09/01/2006 [facility] Eye Exam  11/06/2017  Hemoglobin A1C    03/21/2019  Flu Vaccine  08/01/2019  Diabetic Foot Care  09/21/2019  Albumin Urine Test  09/21/2019  Cholesterol Test   09/21/2019 Allison Mendoza Annual Well Visit  09/22/2019 A Healthy Lifestyle: Care Instructions Your Care Instructions A healthy lifestyle can help you feel good, stay at a healthy weight, and have plenty of energy for both work and play. A healthy lifestyle is something you can share with your whole family. A healthy lifestyle also can lower your risk for serious health problems, such as high blood pressure, heart disease, and diabetes. You can follow a few steps listed below to improve your health and the health of your family. Follow-up care is a key part of your treatment and safety. Be sure to make and go to all appointments, and call your doctor if you are having problems. It's also a good idea to know your test results and keep a list of the medicines you take. How can you care for yourself at home? · Do not eat too much sugar, fat, or fast foods. You can still have dessert and treats now and then. The goal is moderation. · Start small to improve your eating habits. Pay attention to portion sizes, drink less juice and soda pop, and eat more fruits and vegetables. ? Eat a healthy amount of food. A 3-ounce serving of meat, for example, is about the size of a deck of cards. Fill the rest of your plate with vegetables and whole grains. ? Limit the amount of soda and sports drinks you have every day. Drink more water when you are thirsty. ? Eat at least 5 servings of fruits and vegetables every day. It may seem like a lot, but it is not hard to reach this goal. A serving or helping is 1 piece of fruit, 1 cup of vegetables, or 2 cups of leafy, raw vegetables. Have an apple or some carrot sticks as an afternoon snack instead of a candy bar. Try to have fruits and/or vegetables at every meal. 
· Make exercise part of your daily routine. You may want to start with simple activities, such as walking, bicycling, or slow swimming. Try to be active 30 to 60 minutes every day. You do not need to do all 30 to 60 minutes all at once. For example, you can exercise 3 times a day for 10 or 20 minutes. Moderate exercise is safe for most people, but it is always a good idea to talk to your doctor before starting an exercise program. 
· Keep moving. Natalioorlando Putnam the lawn, work in the garden, or [employer]. Take the stairs instead of the elevator at work. · If you smoke, quit. People who smoke have an increased risk for heart attack, stroke, cancer, and other lung illnesses. Quitting is hard, but there are ways to boost your chance of quitting tobacco for good. ? Use nicotine gum, patches, or lozenges. ? Ask your doctor about stop-smoking programs and medicines. ? Keep trying. In addition to reducing your risk of diseases in the future, you will notice some benefits soon after you stop using tobacco. If you have shortness of breath or asthma symptoms, they will likely get better within a few weeks after you quit. · Limit how much alcohol you drink. Moderate amounts of alcohol (up to 2 drinks a day for men, 1 drink a day for women) are okay. But drinking too much can lead to liver problems, high blood pressure, and other health problems. Family health If you have a family, there are many things you can do together to improve your health. · Eat meals together as a family as often as possible. · Eat healthy foods.  This includes fruits, vegetables, lean meats and dairy, and whole grains. · Include your family in your fitness plan. Most people think of activities such as jogging or tennis as the way to fitness, but there are many ways you and your family can be more active. Anything that makes you breathe hard and gets your heart pumping is exercise. Here are some tips: 
? Walk to do errands or to take your child to school or the bus. 
? Go for a family bike ride after dinner instead of watching TV. Where can you learn more? Go to http://negro-ryder.info/. Enter F653 in the search box to learn more about \"A Healthy Lifestyle: Care Instructions. \" Current as of: May 28, 2019 Content Version: 12.2 © 8364-7721 [employer]. Care instructions adapted under license by [employer] (which disclaims liability or warranty for this information). If you have questions about a medical condition or this instruction, always ask your healthcare professional. [address] any warranty or liability for your use of this information.

## 2019-10-29 NOTE — PROGRESS NOTES
Bharti Suárez is a 62 y.o. female   Chief Complaint   Patient presents with    Follow-up    Labs    pt states she was having vomiting and diarrhea. This has improved but states she has no energy, is thirsty. No motivation. Pt has uncontrolled dioabetes and she has not had this checked in over a year. Pt only on metformin for diabetes. On zocor 40 for her cholesterol and tolerates this. Bp remains well controlled on medication. she is a 62y.o. year old female who presents for evalution. Reviewed PmHx, RxHx, FmHx, SocHx, AllgHx and updated and dated in the chart. Review of Systems - negative except as listed above in the HPI    Objective:     Vitals:    10/29/19 1324   BP: 109/78   Pulse: 99   Resp: 16   Temp: 98.4 °F (36.9 °C)   TempSrc: Oral   SpO2: 97%   Weight: 174 lb (78.9 kg)   Height: 5' 2\" (1.575 m)       Current Outpatient Medications   Medication Sig    acetaminophen (TYLENOL) 325 mg tablet Take  by mouth every four (4) hours as needed for Pain.  repaglinide (PRANDIN) 1 mg tablet Take 1 Tab by mouth Before breakfast, lunch, and dinner.  pioglitazone (ACTOS) 30 mg tablet Take 1 Tab by mouth daily.  metFORMIN (GLUCOPHAGE) 1,000 mg tablet TAKE 1 TABLET BY MOUTH TWICE DAILY WITH MEALS    PARoxetine (PAXIL) 30 mg tablet TAKE 1 TABLET BY MOUTH ONCE DAILY    pantoprazole (PROTONIX) 40 mg tablet TAKE 1 TABLET BY MOUTH ONCE DAILY    lisinopril-hydroCHLOROthiazide (PRINZIDE, ZESTORETIC) 20-12.5 mg per tablet TAKE 1 TABLET BY MOUTH TWICE DAILY    allopurinol (ZYLOPRIM) 300 mg tablet TAKE 1 TABLET BY MOUTH ONCE DAILY    simvastatin (ZOCOR) 40 mg tablet TAKE 1 TABLET BY MOUTH NIGHTLY    Insulin Needles, Disposable, (PEN NEEDLE) 31 gauge x 3/16\" ndle QHS use of lantus    Blood-Glucose Meter monitoring kit Please dispense one kit    glucose blood VI test strips (BLOOD GLUCOSE TEST) strip Use four times a day, before meals and at bedtime, to check your blood sugar.     ALPRAZolam Izabel Everts) 0.25 mg tablet Take 0.25 mg by mouth as needed for Anxiety.  omeprazole (PRILOSEC) 20 mg capsule Take 20 mg by mouth daily. No current facility-administered medications for this visit. Physical Examination: General appearance - alert, well appearing, and in no distress  Mental status - alert, oriented to person, place, and time  Chest - clear to auscultation, no wheezes, rales or rhonchi, symmetric air entry  Heart - normal rate, regular rhythm, normal S1, S2, no murmurs, rubs, clicks or gallops  Abdomen - soft, nontender, nondistended, no masses or organomegaly      Assessment/ Plan:   Diagnoses and all orders for this visit:    1. Type 2 diabetes mellitus without complication, without long-term current use of insulin (HCC)  -     MICROALBUMIN, UR, RAND W/ MICROALB/CREAT RATIO  -     AMB POC HEMOGLOBIN A1C  -     repaglinide (PRANDIN) 1 mg tablet; Take 1 Tab by mouth Before breakfast, lunch, and dinner.  -     pioglitazone (ACTOS) 30 mg tablet; Take 1 Tab by mouth daily. 2. Hypercholesterolemia  -     METABOLIC PANEL, COMPREHENSIVE  -     LIPID PANEL    3. Essential hypertension  -     METABOLIC PANEL, COMPREHENSIVE    4. Medicare annual wellness visit, subsequent    5. Screening for alcoholism  -     [place] ANNUAL ALCOHOL SCREEN 15 MIN    6. Encounter for immunization  -     INFLUENZA VACCINE INACTIVATED (IIV), SUBUNIT, ADJUVANTED, IM  -     ADMIN INFLUENZA VIRUS VAC       Follow-up and Dispositions    · Return if symptoms worsen or fail to improve. I have discussed the diagnosis with the patient and the intended plan as seen in the above orders. The patient has received an after-visit summary and questions were answered concerning future plans. Pt conveyed understanding of plan.     Medication Side Effects and Warnings were discussed with patient      Arrodrigo Goetz, DO     This is the Subsequent Medicare Annual Wellness Exam, performed 12 months or more after the Initial AWV or the last Subsequent AWV    I have reviewed the patient's medical history in detail and updated the computerized patient record. History     Patient Active Problem List   Diagnosis Code    DM type 2 (diabetes mellitus, type 2) ([facility] E11.9    HTN (hypertension) I10    Hypercholesterolemia E78.00    Psoriatic arthritis ([facility] L40.50    Psoriasis L40.9    GERD (gastroesophageal reflux disease) K21.9    Personal history of gout Z87.39    Depression F32.9    UTI (lower urinary tract infection) N39.0     Past Medical History:   Diagnosis Date    Arthritis     Diabetes ([facility]     Gout     Hernia, hiatal     Hypertension       Past Surgical History:   Procedure Laterality Date    HX KNEE REPLACEMENT Right      Current Outpatient Medications   Medication Sig Dispense Refill    acetaminophen (TYLENOL) 325 mg tablet Take  by mouth every four (4) hours as needed for Pain.  repaglinide (PRANDIN) 1 mg tablet Take 1 Tab by mouth Before breakfast, lunch, and dinner. 90 Tab 5    pioglitazone (ACTOS) 30 mg tablet Take 1 Tab by mouth daily. 30 Tab 5    metFORMIN (GLUCOPHAGE) 1,000 mg tablet TAKE 1 TABLET BY MOUTH TWICE DAILY WITH MEALS 180 Tab 3    PARoxetine (PAXIL) 30 mg tablet TAKE 1 TABLET BY MOUTH ONCE DAILY 90 Tab 0    pantoprazole (PROTONIX) 40 mg tablet TAKE 1 TABLET BY MOUTH ONCE DAILY 90 Tab 3    lisinopril-hydroCHLOROthiazide (PRINZIDE, ZESTORETIC) 20-12.5 mg per tablet TAKE 1 TABLET BY MOUTH TWICE DAILY 180 Tab 3    allopurinol (ZYLOPRIM) 300 mg tablet TAKE 1 TABLET BY MOUTH ONCE DAILY 90 Tab 3    simvastatin (ZOCOR) 40 mg tablet TAKE 1 TABLET BY MOUTH NIGHTLY 90 Tab 3    Insulin Needles, Disposable, (PEN NEEDLE) 31 gauge x 3/16\" ndle QHS use of lantus 30 Pen Needle 11    Blood-Glucose Meter monitoring kit Please dispense one kit 1 Kit 0    glucose blood VI test strips (BLOOD GLUCOSE TEST) strip Use four times a day, before meals and at bedtime, to check your blood sugar.  100 Strip 11    ALPRAZolam (XANAX) 0.25 mg tablet Take 0.25 mg by mouth as needed for Anxiety.  omeprazole (PRILOSEC) 20 mg capsule Take 20 mg by mouth daily. Allergies   Allergen Reactions    Sulfa (Sulfonamide Antibiotics) Other (comments)     Emesis       Family History   Problem Relation Age of Onset    Cancer Mother     Heart Disease Father     Diabetes Maternal Grandmother      Social History     Tobacco Use    Smoking status: Never Smoker    Smokeless tobacco: Never Used   Substance Use Topics    Alcohol use: No       Depression Risk Factor Screening:     3 most recent PHQ Screens 4/1/2015   Little interest or pleasure in doing things Not at all   Feeling down, depressed, irritable, or hopeless Not at all   Total Score PHQ 2 0       Alcohol Risk Factor Screening:   Do you average 1 drink per night or more than 7 drinks a week:  No    On any one occasion in the past three months have you have had more than 3 drinks containing alcohol:  No      Functional Ability and Level of Safety:   Hearing: Hearing is good. Activities of Daily Living: The home contains: no safety equipment. Patient does total self care    Ambulation: with no difficulty    Fall Risk:  No flowsheet data found. Abuse Screen:  Patient is not abused    Cognitive Screening   Has your family/caregiver stated any concerns about your memory: no      Patient Care Team   Patient Care Team:  Don Brand DO as PCP - General (Family Practice)  Don Brand DO as PCP [facility]  Nasreen Colindres RN as Ambulatory Care Navigator    Assessment/Plan   Education and counseling provided:  Are appropriate based on today's review and evaluation    Diagnoses and all orders for this visit:    1. Type 2 diabetes mellitus without complication, without long-term current use of insulin (HCC)  -     MICROALBUMIN, UR, RAND W/ MICROALB/CREAT RATIO  -     AMB POC HEMOGLOBIN A1C  -     repaglinide (PRANDIN) 1 mg tablet;  Take 1 Tab by mouth Before breakfast, lunch, and dinner.  -     pioglitazone (ACTOS) 30 mg tablet; Take 1 Tab by mouth daily. 2. Hypercholesterolemia  -     METABOLIC PANEL, COMPREHENSIVE  -     LIPID PANEL    3. Essential hypertension  -     METABOLIC PANEL, COMPREHENSIVE    4. Medicare annual wellness visit, subsequent    5. Screening for alcoholism  -     [place] ANNUAL ALCOHOL SCREEN 15 MIN    6. Encounter for immunization  -     INFLUENZA VACCINE INACTIVATED (IIV), SUBUNIT, ADJUVANTED, IM  -     ADMIN INFLUENZA VIRUS VAC        Health Maintenance Due   Topic Date Due    Pneumococcal 0-64 years (1 of 1 - PPSV23) 09/01/1962    PAP AKA CERVICAL CYTOLOGY  09/01/1977    Shingrix Vaccine Age 50> (1 of 2) 09/01/2006    BREAST CANCER SCRN MAMMOGRAM  09/01/2006    FOBT Q 1 YEAR AGE 50-75  09/01/2006    EYE EXAM RETINAL OR DILATED  11/06/2017    HEMOGLOBIN A1C Q6M  03/21/2019    Influenza Age 9 to Adult  08/01/2019    FOOT EXAM Q1  09/21/2019    MICROALBUMIN Q1  09/21/2019    LIPID PANEL Q1  09/21/2019    MEDICARE YEARLY EXAM  09/22/2019     I have discussed the diagnosis with the patient and the intended plan as seen in the above orders. The patient has received an after-visit summary and questions were answered concerning future plans. Pt conveyed understanding of plan.       Dr Yanet Agarwal

## 2019-10-30 LAB
ALBUMIN SERPL-MCNC: 4.3 G/DL (ref 3.6–4.8)
ALBUMIN/CREAT UR: 10 MG/G CREAT (ref 0–30)
ALBUMIN/GLOB SERPL: 2 {RATIO} (ref 1.2–2.2)
ALP SERPL-CCNC: 68 IU/L (ref 39–117)
ALT SERPL-CCNC: 19 IU/L (ref 0–32)
AST SERPL-CCNC: 29 IU/L (ref 0–40)
BILIRUB SERPL-MCNC: 0.2 MG/DL (ref 0–1.2)
BUN SERPL-MCNC: 22 MG/DL (ref 8–27)
BUN/CREAT SERPL: 14 (ref 12–28)
CALCIUM SERPL-MCNC: 9.4 MG/DL (ref 8.7–10.3)
CHLORIDE SERPL-SCNC: 100 MMOL/L (ref 96–106)
CHOLEST SERPL-MCNC: 167 MG/DL (ref 100–199)
CO2 SERPL-SCNC: 21 MMOL/L (ref 20–29)
CREAT SERPL-MCNC: 1.57 MG/DL (ref 0.57–1)
CREAT UR-MCNC: 241.1 MG/DL
GLOBULIN SER CALC-MCNC: 2.2 G/DL (ref 1.5–4.5)
GLUCOSE SERPL-MCNC: 239 MG/DL (ref 65–99)
HDLC SERPL-MCNC: 47 MG/DL
INTERPRETATION, 910389: NORMAL
INTERPRETATION: NORMAL
LDLC SERPL CALC-MCNC: 76 MG/DL (ref 0–99)
Lab: NORMAL
MICROALBUMIN UR-MCNC: 24.1 UG/ML
PDF IMAGE, 910387: NORMAL
POTASSIUM SERPL-SCNC: 4.1 MMOL/L (ref 3.5–5.2)
PROT SERPL-MCNC: 6.5 G/DL (ref 6–8.5)
SODIUM SERPL-SCNC: 138 MMOL/L (ref 134–144)
SPECIMEN STATUS REPORT, ROLRST: NORMAL
TRIGL SERPL-MCNC: 221 MG/DL (ref 0–149)
VLDLC SERPL CALC-MCNC: 44 MG/DL (ref 5–40)

## 2019-10-31 NOTE — PROGRESS NOTES
Pt needs recheck of kidney function in 2 weeksw, her kidney function has declined since last check, ensure good hydration

## 2019-10-31 NOTE — PROGRESS NOTES
Called and spoke with patient in reference to labs. Verbalized understanding.  Apt scheduled for 11/19/19

## 2019-12-18 ENCOUNTER — HOSPITAL ENCOUNTER (OUTPATIENT)
Dept: LAB | Age: 64
Discharge: HOME | End: 2019-12-18

## 2019-12-18 ENCOUNTER — OFFICE VISIT (OUTPATIENT)
Dept: FAMILY MEDICINE CLINIC | Age: 64
End: 2019-12-18

## 2019-12-18 VITALS
RESPIRATION RATE: 16 BRPM | TEMPERATURE: 98.1 F | HEIGHT: 62 IN | HEART RATE: 79 BPM | WEIGHT: 176 LBS | OXYGEN SATURATION: 97 % | BODY MASS INDEX: 32.39 KG/M2 | SYSTOLIC BLOOD PRESSURE: 117 MMHG | DIASTOLIC BLOOD PRESSURE: 81 MMHG

## 2019-12-18 DIAGNOSIS — R79.89 ELEVATED SERUM CREATININE: Primary | ICD-10-CM

## 2019-12-18 DIAGNOSIS — Z12.31 ENCOUNTER FOR SCREENING MAMMOGRAM FOR MALIGNANT NEOPLASM OF BREAST: ICD-10-CM

## 2019-12-18 DIAGNOSIS — R79.89 ELEVATED SERUM CREATININE: ICD-10-CM

## 2019-12-18 DIAGNOSIS — E11.9 TYPE 2 DIABETES MELLITUS WITHOUT COMPLICATION, WITHOUT LONG-TERM CURRENT USE OF INSULIN (HCC): ICD-10-CM

## 2019-12-18 LAB
ALBUMIN SERPL-MCNC: 3.8 G/DL (ref 3.5–5)
ANION GAP SERPL CALC-SCNC: 7 MMOL/L (ref 5–15)
BUN SERPL-MCNC: 26 MG/DL (ref 6–20)
BUN/CREAT SERPL: 17 (ref 12–20)
CALCIUM SERPL-MCNC: 9.3 MG/DL (ref 8.5–10.1)
CHLORIDE SERPL-SCNC: 107 MMOL/L (ref 97–108)
CO2 SERPL-SCNC: 26 MMOL/L (ref 21–32)
CREAT SERPL-MCNC: 1.5 MG/DL (ref 0.55–1.02)
GLUCOSE SERPL-MCNC: 140 MG/DL (ref 65–100)
PHOSPHATE SERPL-MCNC: 3.3 MG/DL (ref 2.6–4.7)
POTASSIUM SERPL-SCNC: 4.5 MMOL/L (ref 3.5–5.1)
SODIUM SERPL-SCNC: 140 MMOL/L (ref 136–145)

## 2019-12-18 RX ORDER — REPAGLINIDE 2 MG/1
2 TABLET ORAL
Qty: 90 TAB | Refills: 5 | Status: SHIPPED | OUTPATIENT
Start: 2019-12-18 | End: 2021-05-17

## 2019-12-18 NOTE — PROGRESS NOTES
Samuel Gardner is a 62 y.o. female   Chief Complaint   Patient presents with    Follow-up    Pt ehre for f/u from recent labs showing an elevated Cr at 1.57 up from 1.05. Pt with uncontrolled Type 2 diabetes and HTN as well. Pt states her sugars have been better her last A1C was 9.5. States 1 hr PP have been in lower 200's. Pt has improved her water intake. she is a 62y.o. year old female who presents for evalution. Reviewed PmHx, RxHx, FmHx, SocHx, AllgHx and updated and dated in the chart. Review of Systems - negative except as listed above in the HPI    Objective:     Vitals:    12/18/19 0708   BP: 117/81   Pulse: 79   Resp: 16   Temp: 98.1 °F (36.7 °C)   TempSrc: Oral   SpO2: 97%   Weight: 176 lb (79.8 kg)   Height: 5' 2\" (1.575 m)       Current Outpatient Medications   Medication Sig    repaglinide (PRANDIN) 2 mg tablet Take 1 Tab by mouth Before breakfast, lunch, and dinner.  acetaminophen (TYLENOL) 325 mg tablet Take  by mouth every four (4) hours as needed for Pain.  pioglitazone (ACTOS) 30 mg tablet Take 1 Tab by mouth daily.  metFORMIN (GLUCOPHAGE) 1,000 mg tablet TAKE 1 TABLET BY MOUTH TWICE DAILY WITH MEALS    PARoxetine (PAXIL) 30 mg tablet TAKE 1 TABLET BY MOUTH ONCE DAILY    pantoprazole (PROTONIX) 40 mg tablet TAKE 1 TABLET BY MOUTH ONCE DAILY    lisinopril-hydroCHLOROthiazide (PRINZIDE, ZESTORETIC) 20-12.5 mg per tablet TAKE 1 TABLET BY MOUTH TWICE DAILY    allopurinol (ZYLOPRIM) 300 mg tablet TAKE 1 TABLET BY MOUTH ONCE DAILY    simvastatin (ZOCOR) 40 mg tablet TAKE 1 TABLET BY MOUTH NIGHTLY    Insulin Needles, Disposable, (PEN NEEDLE) 31 gauge x 3/16\" ndle QHS use of lantus    Blood-Glucose Meter monitoring kit Please dispense one kit    glucose blood VI test strips (BLOOD GLUCOSE TEST) strip Use four times a day, before meals and at bedtime, to check your blood sugar.  ALPRAZolam (XANAX) 0.25 mg tablet Take 0.25 mg by mouth as needed for Anxiety.     omeprazole (PRILOSEC) 20 mg capsule Take 20 mg by mouth daily. No current facility-administered medications for this visit. Physical Examination: General appearance - alert, well appearing, and in no distress  Chest - clear to auscultation, no wheezes, rales or rhonchi, symmetric air entry  Heart - normal rate, regular rhythm, normal S1, S2, no murmurs, rubs, clicks or gallops      Assessment/ Plan:   Diagnoses and all orders for this visit:    1. Elevated serum creatinine  -     RENAL FUNCTION PANEL; Future    2. Type 2 diabetes mellitus without complication, without long-term current use of insulin (HCC)  -     repaglinide (PRANDIN) 2 mg tablet; Take 1 Tab by mouth Before breakfast, lunch, and dinner. 3. Encounter for screening mammogram for malignant neoplasm of breast  -     PATY MAMMO BI SCREENING INCL CAD; Future       Follow-up and Dispositions    · Return if symptoms worsen or fail to improve. I have discussed the diagnosis with the patient and the intended plan as seen in the above orders. The patient has received an after-visit summary and questions were answered concerning future plans. Pt conveyed understanding of plan.     Medication Side Effects and Warnings were discussed with patient      Sameer Hunt, DO

## 2019-12-18 NOTE — PATIENT INSTRUCTIONS
Nutrition Tips for Diabetes: After Your Visit Your Care Instructions A healthy diet is important to manage diabetes. It helps you lose weight (if you need to) and keep it off. It gives you the nutrition and energy your body needs and helps prevent heart disease. But a diet for diabetes does not mean that you have to eat special foods. You can eat what your family eats, including occasional sweets and other favorites. But you do have to pay attention to how often you eat and how much you eat of certain foods. The right plan for you will give you meals that help you keep your blood sugar at healthy levels. Try to eat a variety of foods and to spread carbohydrate throughout the day. Carbohydrate raises blood sugar higher and more quickly than any other nutrient does. Carbohydrate is found in sugar, breads and cereals, fruit, starchy vegetables such as potatoes and corn, and milk and yogurt. You may want to work with a dietitian or diabetes educator to help you plan meals and snacks. A dietitian or diabetes educator also can help you lose weight if that is one of your goals. The following tips can help you enjoy your meals and stay healthy. Follow-up care is a key part of your treatment and safety. Be sure to make and go to all appointments, and call your doctor if you are having problems. Its also a good idea to know your test results and keep a list of the medicines you take. How can you care for yourself at home? · Learn which foods have carbohydrate and how much carbohydrate to eat. A dietitian or diabetes educator can help you learn to keep track of how much carbohydrate you eat. · Spread carbohydrate throughout the day. Eat some carbohydrate at all meals, but do not eat too much at any one time. · Plan meals to include food from all the food groups. These are the food groups and some example portion sizes: ¨ Grains: 1 slice of bread (1 ounce), ½ cup of cooked cereal, and 1/3 cup of cooked pasta or rice. These have about 15 grams of carbohydrate in a serving. Choose whole grains such as whole wheat bread or crackers, oatmeal, and brown rice more often than refined grains. ¨ Fruit: 1 small fresh fruit, such as an apple or orange; ½ of a banana; ½ cup of chopped, cooked, or canned fruit; ½ cup of fruit juice; 1 cup of melon or raspberries; and 2 tablespoons of dried fruit. These have about 15 grams of carbohydrate in a serving. ¨ Dairy: 1 cup of nonfat or low-fat milk and 2/3 cup of plain yogurt. These have about 15 grams of carbohydrate in a serving. ¨ Protein foods: Beef, chicken, turkey, fish, eggs, tofu, cheese, cottage cheese, and peanut butter. A serving size of meat is 3 ounces, which is about the size of a deck of cards. Examples of meat substitute serving sizes (equal to 1 ounce of meat) are 1/4 cup of cottage cheese, 1 egg, 1 tablespoon of peanut butter, and ½ cup of tofu. These have very little or no carbohydrate per serving. ¨ Vegetables: Starchy vegetables such as ½ cup of cooked dried beans, peas, potatoes, or corn have about 15 grams of carbohydrate. Nonstarchy vegetables have very little carbohydrate, such as 1 cup of raw leafy vegetables (such as spinach), ½ cup of other vegetables (cooked or chopped), and 3/4 cup of vegetable juice. · Use the plate format to plan meals. It is a good, quick way to make sure that you have a balanced meal. It also helps you spread carbohydrate throughout the day. You divide your plate by types of foods. Put vegetables on half the plate, meat or meat substitutes on one-quarter of the plate, and a grain or starchy vegetable (such as brown rice or a potato) in the final quarter of the plate.  To this you can add a small piece of fruit and 1 cup of milk or yogurt, depending on how much carbohydrate you are supposed to eat at a meal. 
· Talk to your dietitian or diabetes educator about ways to add limited amounts of sweets into your meal plan. You can eat these foods now and then, as long as you include the amount of carbohydrate they have in your daily carbohydrate allowance. · If you drink alcohol, limit it to no more than 1 drink a day for women and 2 drinks a day for men. If you are pregnant, no amount of alcohol is known to be safe. · Protein, fat, and fiber do not raise blood sugar as much as carbohydrate does. If you eat a lot of these nutrients in a meal, your blood sugar will rise more slowly than it would otherwise. · Limit saturated fats, such as those from meat and dairy products. Try to replace it with monounsaturated fat, such as olive oil. This is a healthier choice because people who have diabetes are at higher-than-average risk of heart disease. But use a modest amount of olive oil. A tablespoon of olive oil has 14 grams of fat and 120 calories. · Exercise lowers blood sugar. If you take insulin by shots or pump, you can use less than you would if you were not exercising. Keep in mind that timing matters. If you exercise within 1 hour after a meal, your body may need less insulin for that meal than it would if you exercised 3 hours after the meal. Test your blood sugar to find out how exercise affects your need for insulin. · Exercise on most days of the week. Aim for at least 30 minutes. Exercise helps you stay at a healthy weight and helps your body use insulin. Walking is an easy way to get exercise. Gradually increase the amount you walk every day. You also may want to swim, bike, or do other activities. When you eat out · Learn to estimate the serving sizes of foods that have carbohydrate. If you measure food at home, it will be easier to estimate the amount in a serving of restaurant food. · If the meal you order has too much carbohydrate (such as potatoes, corn, or baked beans), ask to have a low-carbohydrate food instead. Ask for a salad or green vegetables. · If you use insulin, check your blood sugar before and after eating out to help you plan how much to eat in the future. · If you eat more carbohydrate at a meal than you had planned, take a walk or do other exercise. This will help lower your blood sugar. Where can you learn more? Go to [employer] Enter C377 in the search box to learn more about \"Nutrition Tips for Diabetes: After Your Visit. \"  
© 8192-2006 [employer]. Care instructions adapted under license by [facility] (which disclaims liability or warranty for this information). This care instruction is for use with your licensed healthcare professional. If you have questions about a medical condition or this instruction, always ask your healthcare professional. [address] any warranty or liability for your use of this information. Content Version: 95.7.791079; Current as of: June 4, 2014 Medicare Wellness Visit, Female The best way to live healthy is to have a lifestyle where you eat a well-balanced diet, exercise regularly, limit alcohol use, and quit all forms of tobacco/nicotine, if applicable. Regular preventive services are another way to keep healthy. Preventive services (vaccines, screening tests, monitoring & exams) can help personalize your care plan, which helps you manage your own care. Screening tests can find health problems at the earliest stages, when they are easiest to treat. [facility] follows the current, evidence-based guidelines published by the [facility] (USPSTF) when recommending preventive services for our patients. Because we follow these guidelines, sometimes recommendations change over time as research supports it. (For example, mammograms used to be recommended annually.  Even though Medicare will still pay for an annual mammogram, the newer guidelines recommend a mammogram every two years for women of average risk). Of course, you and your doctor may decide to screen more often for some diseases, based on your risk and your co-morbidities (chronic disease you are already diagnosed with). Preventive services for you include: - Medicare offers their members a free annual wellness visit, which is time for you and your primary care provider to discuss and plan for your preventive service needs. Take advantage of this benefit every year! 
-All adults over the age of 72 should receive the recommended pneumonia vaccines. Current USPSTF guidelines recommend a series of two vaccines for the best pneumonia protection.  
-All adults should have a flu vaccine yearly and a tetanus vaccine every 10 years.  
-All adults age 48 and older should receive the shingles vaccines (series of two vaccines). -All adults age 38-68 who are overweight should have a diabetes screening test once every three years.  
-All adults born between [birth year] and 1965 should be screened once for Hepatitis C. 
-Other screening tests and preventive services for persons with diabetes include: an eye exam to screen for diabetic retinopathy, a kidney function test, a foot exam, and stricter control over your cholesterol.  
-Cardiovascular screening for adults with routine risk involves an electrocardiogram (ECG) at intervals determined by your doctor.  
-Colorectal cancer screenings should be done for adults age 54-65 with no increased risk factors for colorectal cancer. There are a number of acceptable methods of screening for this type of cancer. Each test has its own benefits and drawbacks. Discuss with your doctor what is most appropriate for you during your annual wellness visit. The different tests include: colonoscopy (considered the best screening method), a fecal occult blood test, a fecal DNA test, and sigmoidoscopy. -A bone mass density test is recommended when a woman turns 65 to screen for osteoporosis. This test is only recommended one time, as a screening. Some providers will use this same test as a disease monitoring tool if you already have osteoporosis. -Breast cancer screenings are recommended every other year for women of normal risk, age 54-69. 
-Cervical cancer screenings for women over age 72 are only recommended with certain risk factors. Here is a list of your current Health Maintenance items (your personalized list of preventive services) with a due date: 
Health Maintenance Due Topic Date Due  Pneumococcal Vaccine (1 of 1 - PPSV23) 09/01/1962  Pap Test  09/01/1977  Shingles Vaccine (1 of 2) 09/01/2006  Mammogram  09/01/2006  Stool testing for trace blood  09/01/2006 [facility] Eye Exam  11/06/2017  Diabetic Foot Care  09/21/2019

## 2019-12-18 NOTE — PROGRESS NOTES
Chief Complaint   Patient presents with    Follow-up     Patient presents in office today for 2 week f/u and labs. No concerns. 1. Have you been to the ER, urgent care clinic since your last visit? Hospitalized since your last visit? No    2. Have you seen or consulted any other health care providers outside of the [address] [place] [address] since your last visit? Include any pap smears or colon screening.  No    Learning Assessment 2/9/2016   PRIMARY LEARNER Patient   HIGHEST LEVEL OF EDUCATION - PRIMARY LEARNER  GRADUATED HIGH SCHOOL OR GED   BARRIERS PRIMARY LEARNER NONE     -   CO-LEARNER CAREGIVER No   PRIMARY LANGUAGE ENGLISH   LEARNER PREFERENCE PRIMARY DEMONSTRATION   ANSWERED BY pt   RELATIONSHIP SELF

## 2019-12-30 DIAGNOSIS — F32.A DEPRESSION, UNSPECIFIED DEPRESSION TYPE: ICD-10-CM

## 2019-12-31 RX ORDER — PAROXETINE 30 MG/1
TABLET, FILM COATED ORAL
Qty: 90 TAB | Refills: 3 | Status: SHIPPED | OUTPATIENT
Start: 2019-12-31 | End: 2020-01-07

## 2020-01-04 DIAGNOSIS — F32.A DEPRESSION, UNSPECIFIED DEPRESSION TYPE: ICD-10-CM

## 2020-01-07 RX ORDER — PAROXETINE 30 MG/1
TABLET, FILM COATED ORAL
Qty: 90 TAB | Refills: 0 | Status: SHIPPED | OUTPATIENT
Start: 2020-01-07 | End: 2020-04-07

## 2020-03-15 DIAGNOSIS — E78.00 HYPERCHOLESTEROLEMIA: ICD-10-CM

## 2020-03-17 ENCOUNTER — TELEPHONE (OUTPATIENT)
Dept: FAMILY MEDICINE CLINIC | Age: 65
End: 2020-03-17

## 2020-03-17 RX ORDER — SIMVASTATIN 40 MG/1
TABLET, FILM COATED ORAL
Qty: 90 TAB | Refills: 0 | Status: SHIPPED | OUTPATIENT
Start: 2020-03-17 | End: 2020-06-15

## 2020-03-17 NOTE — TELEPHONE ENCOUNTER
----- Message from Jaison Madrigal sent at 3/17/2020  1:57 PM EDT -----  Regarding: Dr Thornton Savanah: [phone]  General Message/Vendor Calls    Caller's first and last name:Isabelle Rosylyssa Gaelmiralyssa      Reason for call: pt wants to know if she should come in tomorrow for her lab work      Callback required yes/no and why:yes      Best contact number(s):[phone]      Details to clarify the request:      Jaison Madrigal

## 2020-03-17 NOTE — TELEPHONE ENCOUNTER
Called and spoke with patient. Advised that if she is not ill then it is best to not come into the office.  Patient verbalized understanding and r/s for 4/21/20

## 2020-03-23 RX ORDER — LISINOPRIL AND HYDROCHLOROTHIAZIDE 12.5; 2 MG/1; MG/1
TABLET ORAL
Qty: 180 TAB | Refills: 0 | Status: SHIPPED | OUTPATIENT
Start: 2020-03-23 | End: 2020-06-15 | Stop reason: SDUPTHER

## 2020-03-23 RX ORDER — ALLOPURINOL 300 MG/1
TABLET ORAL
Qty: 90 TAB | Refills: 0 | Status: SHIPPED | OUTPATIENT
Start: 2020-03-23 | End: 2020-06-15 | Stop reason: SDUPTHER

## 2020-04-01 DIAGNOSIS — E11.9 TYPE 2 DIABETES MELLITUS WITHOUT COMPLICATION, WITHOUT LONG-TERM CURRENT USE OF INSULIN (HCC): ICD-10-CM

## 2020-04-01 RX ORDER — PIOGLITAZONEHYDROCHLORIDE 30 MG/1
30 TABLET ORAL DAILY
Qty: 90 TAB | Refills: 3 | Status: SHIPPED | OUTPATIENT
Start: 2020-04-01 | End: 2021-05-19

## 2020-04-05 DIAGNOSIS — F32.A DEPRESSION, UNSPECIFIED DEPRESSION TYPE: ICD-10-CM

## 2020-04-06 DIAGNOSIS — F32.A DEPRESSION, UNSPECIFIED DEPRESSION TYPE: ICD-10-CM

## 2020-04-07 RX ORDER — PAROXETINE 30 MG/1
TABLET, FILM COATED ORAL
Qty: 90 TAB | Refills: 0 | Status: SHIPPED | OUTPATIENT
Start: 2020-04-07 | End: 2020-04-09

## 2020-04-07 RX ORDER — PANTOPRAZOLE SODIUM 40 MG/1
TABLET, DELAYED RELEASE ORAL
Qty: 90 TAB | Refills: 0 | Status: SHIPPED | OUTPATIENT
Start: 2020-04-07 | End: 2020-04-09

## 2020-04-07 RX ORDER — PAROXETINE 30 MG/1
TABLET, FILM COATED ORAL
Qty: 90 TAB | Refills: 0 | OUTPATIENT
Start: 2020-04-07

## 2020-04-07 RX ORDER — PANTOPRAZOLE SODIUM 40 MG/1
TABLET, DELAYED RELEASE ORAL
Qty: 90 TAB | Refills: 3 | OUTPATIENT
Start: 2020-04-07

## 2020-04-08 DIAGNOSIS — F32.A DEPRESSION, UNSPECIFIED DEPRESSION TYPE: ICD-10-CM

## 2020-04-09 RX ORDER — PAROXETINE 30 MG/1
TABLET, FILM COATED ORAL
Qty: 90 TAB | Refills: 0 | Status: SHIPPED | OUTPATIENT
Start: 2020-04-09 | End: 2020-07-07

## 2020-04-09 RX ORDER — PANTOPRAZOLE SODIUM 40 MG/1
TABLET, DELAYED RELEASE ORAL
Qty: 90 TAB | Refills: 0 | Status: SHIPPED | OUTPATIENT
Start: 2020-04-09 | End: 2020-07-07

## 2020-04-28 ENCOUNTER — DOCUMENTATION ONLY (OUTPATIENT)
Dept: FAMILY MEDICINE CLINIC | Age: 65
End: 2020-04-28

## 2020-06-15 DIAGNOSIS — E78.00 HYPERCHOLESTEROLEMIA: ICD-10-CM

## 2020-06-15 RX ORDER — SIMVASTATIN 40 MG/1
TABLET, FILM COATED ORAL
Qty: 90 TAB | Refills: 0 | Status: SHIPPED | OUTPATIENT
Start: 2020-06-15 | End: 2020-09-14 | Stop reason: SDUPTHER

## 2020-06-15 RX ORDER — LISINOPRIL AND HYDROCHLOROTHIAZIDE 12.5; 2 MG/1; MG/1
TABLET ORAL
Qty: 180 TAB | Refills: 0 | Status: SHIPPED | OUTPATIENT
Start: 2020-06-15 | End: 2020-09-21 | Stop reason: SDUPTHER

## 2020-06-15 RX ORDER — ALLOPURINOL 300 MG/1
TABLET ORAL
Qty: 90 TAB | Refills: 0 | Status: SHIPPED | OUTPATIENT
Start: 2020-06-15 | End: 2020-09-21 | Stop reason: SDUPTHER

## 2020-07-06 DIAGNOSIS — F32.A DEPRESSION, UNSPECIFIED DEPRESSION TYPE: ICD-10-CM

## 2020-07-07 DIAGNOSIS — F32.A DEPRESSION, UNSPECIFIED DEPRESSION TYPE: ICD-10-CM

## 2020-07-07 RX ORDER — PANTOPRAZOLE SODIUM 40 MG/1
40 TABLET, DELAYED RELEASE ORAL DAILY
Qty: 90 TAB | Refills: 0 | Status: SHIPPED | OUTPATIENT
Start: 2020-07-07 | End: 2020-10-06

## 2020-07-07 RX ORDER — PAROXETINE 30 MG/1
30 TABLET, FILM COATED ORAL DAILY
Qty: 90 TAB | Refills: 0 | Status: SHIPPED | OUTPATIENT
Start: 2020-07-07 | End: 2020-10-14

## 2020-07-07 RX ORDER — PANTOPRAZOLE SODIUM 40 MG/1
TABLET, DELAYED RELEASE ORAL
Qty: 90 TAB | Refills: 0 | Status: SHIPPED | OUTPATIENT
Start: 2020-07-07 | End: 2020-07-07 | Stop reason: SDUPTHER

## 2020-07-07 RX ORDER — PAROXETINE 30 MG/1
TABLET, FILM COATED ORAL
Qty: 90 TAB | Refills: 0 | Status: SHIPPED | OUTPATIENT
Start: 2020-07-07 | End: 2020-07-07 | Stop reason: SDUPTHER

## 2020-09-14 DIAGNOSIS — E78.00 HYPERCHOLESTEROLEMIA: ICD-10-CM

## 2020-09-15 RX ORDER — SIMVASTATIN 40 MG/1
40 TABLET, FILM COATED ORAL
Qty: 90 TAB | Refills: 0 | Status: SHIPPED | OUTPATIENT
Start: 2020-09-15 | End: 2020-12-01

## 2020-09-22 RX ORDER — ALLOPURINOL 300 MG/1
TABLET ORAL
Qty: 90 TAB | Refills: 0 | Status: SHIPPED | OUTPATIENT
Start: 2020-09-22 | End: 2020-12-23

## 2020-09-22 RX ORDER — LISINOPRIL AND HYDROCHLOROTHIAZIDE 12.5; 2 MG/1; MG/1
TABLET ORAL
Qty: 180 TAB | Refills: 0 | Status: SHIPPED | OUTPATIENT
Start: 2020-09-22 | End: 2020-12-01

## 2020-10-06 RX ORDER — PANTOPRAZOLE SODIUM 40 MG/1
TABLET, DELAYED RELEASE ORAL
Qty: 90 TAB | Refills: 0 | Status: SHIPPED | OUTPATIENT
Start: 2020-10-06 | End: 2020-10-06 | Stop reason: SDUPTHER

## 2020-10-06 RX ORDER — PANTOPRAZOLE SODIUM 40 MG/1
TABLET, DELAYED RELEASE ORAL
Qty: 90 TAB | Refills: 3 | Status: SHIPPED | OUTPATIENT
Start: 2020-10-06 | End: 2021-11-09

## 2020-10-10 ENCOUNTER — HOSPITAL ENCOUNTER (EMERGENCY)
Age: 65
Discharge: HOME | End: 2020-10-10
Attending: EMERGENCY MEDICINE | Admitting: EMERGENCY MEDICINE
Payer: MEDICARE

## 2020-10-10 ENCOUNTER — APPOINTMENT (OUTPATIENT)
Dept: CT IMAGING | Age: 65
End: 2020-10-10
Attending: EMERGENCY MEDICINE
Payer: MEDICARE

## 2020-10-10 VITALS
SYSTOLIC BLOOD PRESSURE: 155 MMHG | DIASTOLIC BLOOD PRESSURE: 86 MMHG | TEMPERATURE: 98.6 F | HEART RATE: 79 BPM | WEIGHT: 180 LBS | RESPIRATION RATE: 16 BRPM | BODY MASS INDEX: 33.13 KG/M2 | HEIGHT: 62 IN | OXYGEN SATURATION: 98 %

## 2020-10-10 DIAGNOSIS — R10.9 RIGHT FLANK PAIN: Primary | ICD-10-CM

## 2020-10-10 DIAGNOSIS — M54.31 SCIATICA OF RIGHT SIDE: ICD-10-CM

## 2020-10-10 LAB
ALBUMIN SERPL-MCNC: 4.1 G/DL (ref 3.5–5)
ALBUMIN/GLOB SERPL: 1.1 {RATIO} (ref 1.1–2.2)
ALP SERPL-CCNC: 71 U/L (ref 45–117)
ALT SERPL-CCNC: 14 U/L (ref 12–78)
ANION GAP SERPL CALC-SCNC: 5 MMOL/L (ref 5–15)
APPEARANCE UR: ABNORMAL
AST SERPL-CCNC: 13 U/L (ref 15–37)
BACTERIA URNS QL MICRO: NEGATIVE /HPF
BASOPHILS # BLD: 0 K/UL (ref 0–0.1)
BASOPHILS NFR BLD: 1 % (ref 0–1)
BILIRUB SERPL-MCNC: 0.3 MG/DL (ref 0.2–1)
BILIRUB UR QL: NEGATIVE
BUN SERPL-MCNC: 24 MG/DL (ref 6–20)
BUN/CREAT SERPL: 14 (ref 12–20)
CALCIUM SERPL-MCNC: 9.8 MG/DL (ref 8.5–10.1)
CHLORIDE SERPL-SCNC: 102 MMOL/L (ref 97–108)
CO2 SERPL-SCNC: 30 MMOL/L (ref 21–32)
COLOR UR: ABNORMAL
COMMENT, HOLDF: NORMAL
CREAT SERPL-MCNC: 1.72 MG/DL (ref 0.55–1.02)
DIFFERENTIAL METHOD BLD: ABNORMAL
EOSINOPHIL # BLD: 0.2 K/UL (ref 0–0.4)
EOSINOPHIL NFR BLD: 4 % (ref 0–7)
EPITH CASTS URNS QL MICRO: ABNORMAL /LPF
ERYTHROCYTE [DISTWIDTH] IN BLOOD BY AUTOMATED COUNT: 14 % (ref 11.5–14.5)
GLOBULIN SER CALC-MCNC: 3.9 G/DL (ref 2–4)
GLUCOSE SERPL-MCNC: 124 MG/DL (ref 65–100)
GLUCOSE UR STRIP.AUTO-MCNC: NEGATIVE MG/DL
HCT VFR BLD AUTO: 35.2 % (ref 35–47)
HGB BLD-MCNC: 11.2 G/DL (ref 11.5–16)
HGB UR QL STRIP: NEGATIVE
IMM GRANULOCYTES # BLD AUTO: 0 K/UL (ref 0–0.04)
IMM GRANULOCYTES NFR BLD AUTO: 0 % (ref 0–0.5)
KETONES UR QL STRIP.AUTO: NEGATIVE MG/DL
LEUKOCYTE ESTERASE UR QL STRIP.AUTO: ABNORMAL
LIPASE SERPL-CCNC: 173 U/L (ref 73–393)
LYMPHOCYTES # BLD: 1.7 K/UL (ref 0.8–3.5)
LYMPHOCYTES NFR BLD: 33 % (ref 12–49)
MCH RBC QN AUTO: 30.9 PG (ref 26–34)
MCHC RBC AUTO-ENTMCNC: 31.8 G/DL (ref 30–36.5)
MCV RBC AUTO: 97 FL (ref 80–99)
MONOCYTES # BLD: 0.3 K/UL (ref 0–1)
MONOCYTES NFR BLD: 7 % (ref 5–13)
NEUTS SEG # BLD: 2.9 K/UL (ref 1.8–8)
NEUTS SEG NFR BLD: 55 % (ref 32–75)
NITRITE UR QL STRIP.AUTO: NEGATIVE
NRBC # BLD: 0 K/UL (ref 0–0.01)
NRBC BLD-RTO: 0 PER 100 WBC
PH UR STRIP: 7 [PH] (ref 5–8)
PLATELET # BLD AUTO: 214 K/UL (ref 150–400)
PMV BLD AUTO: 12 FL (ref 8.9–12.9)
POTASSIUM SERPL-SCNC: 3.7 MMOL/L (ref 3.5–5.1)
PROT SERPL-MCNC: 8 G/DL (ref 6.4–8.2)
PROT UR STRIP-MCNC: NEGATIVE MG/DL
RBC # BLD AUTO: 3.63 M/UL (ref 3.8–5.2)
RBC #/AREA URNS HPF: ABNORMAL /HPF (ref 0–5)
SAMPLES BEING HELD,HOLD: NORMAL
SODIUM SERPL-SCNC: 137 MMOL/L (ref 136–145)
SP GR UR REFRACTOMETRY: 1.02 (ref 1–1.03)
UR CULT HOLD, URHOLD: NORMAL
UROBILINOGEN UR QL STRIP.AUTO: 1 EU/DL (ref 0.2–1)
WBC # BLD AUTO: 5.2 K/UL (ref 3.6–11)
WBC URNS QL MICRO: ABNORMAL /HPF (ref 0–4)

## 2020-10-10 PROCEDURE — 81001 URINALYSIS AUTO W/SCOPE: CPT

## 2020-10-10 PROCEDURE — 74176 CT ABD & PELVIS W/O CONTRAST: CPT

## 2020-10-10 PROCEDURE — 80053 COMPREHEN METABOLIC PANEL: CPT

## 2020-10-10 PROCEDURE — 83690 ASSAY OF LIPASE: CPT

## 2020-10-10 PROCEDURE — 85025 COMPLETE CBC W/AUTO DIFF WBC: CPT

## 2020-10-10 PROCEDURE — 36415 COLL VENOUS BLD VENIPUNCTURE: CPT

## 2020-10-10 PROCEDURE — 99283 EMERGENCY DEPT VISIT LOW MDM: CPT

## 2020-10-10 RX ORDER — LIDOCAINE 50 MG/G
PATCH TOPICAL
Qty: 30 EACH | Refills: 0 | Status: SHIPPED | OUTPATIENT
Start: 2020-10-10 | End: 2021-05-17

## 2020-10-10 RX ORDER — DICLOFENAC SODIUM 10 MG/G
4 GEL TOPICAL 4 TIMES DAILY
Qty: 100 G | Refills: 0 | Status: SHIPPED | OUTPATIENT
Start: 2020-10-10 | End: 2021-05-17

## 2020-10-10 NOTE — ED TRIAGE NOTES
Pt reports right flank pain that started Thursday. Pt denies urinary symptoms. Pt reports vomiting that started today. No fevers or diarrhea. Pt denies injury to area.

## 2020-10-10 NOTE — ED PROVIDER NOTES
78-year-old female with history of prediabetes and hypertension presents from home with approximately 2 days of worsening right flank and back pain. She denies any fevers. She first noticed the pain on Thursday and is continued to intensify since that time. Pain is constant and exacerbated by getting up and walking around. She has not noticed any rashes or fevers. She has some nausea with vomiting this morning. She denies any hematuria or dysuria, but tells me she is had some frequency with small volumes. She has never had a history of kidney stone or been diagnosed with sciatic nerve pain. The history is provided by the patient. Flank Pain    This is a new problem. The current episode started more than 2 days ago. The problem has been gradually worsening. The problem occurs constantly. The pain is associated with no known injury. The pain is present in the lower back and right side. The quality of the pain is described as dull. The pain is severe. Pertinent negatives include no chest pain, no fever, no numbness, no headaches, no abdominal pain, no abdominal swelling, no bowel incontinence, no bladder incontinence, no dysuria, no paresthesias, no paresis, no tingling and no weakness. Treatments tried: Tylenol.         Past Medical History:   Diagnosis Date    Arthritis     Diabetes ([address]     Gout     Hernia, hiatal     Hypertension        Past Surgical History:   Procedure Laterality Date    HX KNEE REPLACEMENT Right          Family History:   Problem Relation Age of Onset    Cancer Mother     Heart Disease Father     Diabetes Maternal Grandmother        Social History     Socioeconomic History    Marital status:      Spouse name: Not on file    Number of children: Not on file    Years of education: Not on file    Highest education level: Not on file   Occupational History    Not on file   Social Needs    Financial resource strain: Not on file    Food insecurity     Worry: Not on file     Inability: Not on file    Transportation needs     Medical: Not on file     Non-medical: Not on file   Tobacco Use    Smoking status: Never Smoker    Smokeless tobacco: Never Used   Substance and Sexual Activity    Alcohol use: No    Drug use: No    Sexual activity: Never   Lifestyle    Physical activity     Days per week: Not on file     Minutes per session: Not on file    Stress: Not on file   Relationships    Social connections     Talks on phone: Not on file     Gets together: Not on file     Attends Yazdanism service: Not on file     Active member of club or organization: Not on file     Attends meetings of clubs or organizations: Not on file     Relationship status: Not on file    Intimate partner violence     Fear of current or ex partner: Not on file     Emotionally abused: Not on file     Physically abused: Not on file     Forced sexual activity: Not on file   Other Topics Concern    Not on file   Social History Narrative    Not on file         ALLERGIES: Sulfa (sulfonamide antibiotics)    Review of Systems   Constitutional: Negative for fatigue and fever. HENT: Negative for sneezing and sore throat. Respiratory: Negative for cough and shortness of breath. Cardiovascular: Negative for chest pain and leg swelling. Gastrointestinal: Negative for abdominal pain, bowel incontinence, diarrhea, nausea and vomiting. Genitourinary: Positive for decreased urine volume, flank pain and frequency. Negative for bladder incontinence, difficulty urinating and dysuria. Musculoskeletal: Negative for arthralgias and myalgias. Skin: Negative for color change and rash. Neurological: Negative for tingling, weakness, numbness, headaches and paresthesias. Psychiatric/Behavioral: Negative for agitation and behavioral problems.        Vitals:    10/10/20 1524   BP: (!) 145/94   Pulse: 87   Resp: 16   Temp: 98.5 °F (36.9 °C)   SpO2: 98%   Weight: 81.6 kg (180 lb)   Height: 5' 2\" (1.575 m) Physical Exam  Constitutional:       General: She is not in acute distress. Appearance: Normal appearance. She is not ill-appearing. HENT:      Head: Normocephalic and atraumatic. Nose: Nose normal.      Mouth/Throat:      Mouth: Mucous membranes are moist.      Pharynx: Oropharynx is clear. Eyes:      Conjunctiva/sclera: Conjunctivae normal.      Pupils: Pupils are equal, round, and reactive to light. Neck:      Musculoskeletal: Normal range of motion and neck supple. No muscular tenderness. Cardiovascular:      Rate and Rhythm: Normal rate and regular rhythm. Pulses: Normal pulses. Heart sounds: Normal heart sounds. Pulmonary:      Effort: Pulmonary effort is normal.      Breath sounds: Normal breath sounds. Abdominal:      Palpations: Abdomen is soft. Tenderness: There is no abdominal tenderness. There is right CVA tenderness. There is no left CVA tenderness. Musculoskeletal: Normal range of motion. Right lower leg: No edema. Left lower leg: No edema. Comments: Positive straight leg raise on the right   Skin:     General: Skin is warm and dry. Capillary Refill: Capillary refill takes less than 2 seconds. Findings: No rash. Neurological:      General: No focal deficit present. Mental Status: She is alert and oriented to person, place, and time. Psychiatric:         Mood and Affect: Mood normal.         Behavior: Behavior normal.          MDM  Number of Diagnoses or Management Options  Diagnosis management comments: 80-year-old female presents as above with right flank/back pain. Work-up does not show evidence of kidney stones. The appendix is not visualized but no secondary signs of infection. She also has no [place] ptosis. Her renal function remains at baseline. As she had a positive straight leg raise on the right I suspect that her pain is likely musculoskeletal in origin.   Plan to treat with topical NSAID as well as Lidoderm patches and have her follow-up with primary care.        Amount and/or Complexity of Data Reviewed  Clinical lab tests: reviewed  Tests in the radiology section of CPT®: reviewed  Decide to obtain previous medical records or to obtain history from someone other than the patient: yes           Procedures

## 2020-10-13 DIAGNOSIS — F32.A DEPRESSION, UNSPECIFIED DEPRESSION TYPE: ICD-10-CM

## 2020-10-14 DIAGNOSIS — F32.A DEPRESSION, UNSPECIFIED DEPRESSION TYPE: ICD-10-CM

## 2020-10-14 RX ORDER — PAROXETINE 30 MG/1
TABLET, FILM COATED ORAL
Qty: 90 TAB | Refills: 0 | Status: SHIPPED | OUTPATIENT
Start: 2020-10-14 | End: 2020-10-15

## 2020-10-15 RX ORDER — PAROXETINE 30 MG/1
TABLET, FILM COATED ORAL
Qty: 90 TAB | Refills: 0 | Status: SHIPPED | OUTPATIENT
Start: 2020-10-15 | End: 2021-01-04

## 2020-11-25 DIAGNOSIS — E11.9 TYPE 2 DIABETES MELLITUS WITHOUT COMPLICATION, WITHOUT LONG-TERM CURRENT USE OF INSULIN (HCC): ICD-10-CM

## 2020-11-28 DIAGNOSIS — E78.00 HYPERCHOLESTEROLEMIA: ICD-10-CM

## 2020-11-30 DIAGNOSIS — E78.00 HYPERCHOLESTEROLEMIA: ICD-10-CM

## 2020-11-30 DIAGNOSIS — E11.9 TYPE 2 DIABETES MELLITUS WITHOUT COMPLICATION, WITHOUT LONG-TERM CURRENT USE OF INSULIN (HCC): ICD-10-CM

## 2020-12-01 RX ORDER — METFORMIN HYDROCHLORIDE 1000 MG/1
TABLET ORAL
Qty: 180 TAB | Refills: 0 | Status: SHIPPED | OUTPATIENT
Start: 2020-12-01 | End: 2021-05-17

## 2020-12-01 RX ORDER — SIMVASTATIN 40 MG/1
TABLET, FILM COATED ORAL
Qty: 90 TAB | Refills: 0 | Status: SHIPPED | OUTPATIENT
Start: 2020-12-01 | End: 2020-12-11 | Stop reason: SDUPTHER

## 2020-12-01 RX ORDER — LISINOPRIL AND HYDROCHLOROTHIAZIDE 12.5; 2 MG/1; MG/1
TABLET ORAL
Qty: 180 TAB | Refills: 0 | Status: SHIPPED | OUTPATIENT
Start: 2020-12-01 | End: 2020-12-11 | Stop reason: SDUPTHER

## 2020-12-02 RX ORDER — METFORMIN HYDROCHLORIDE 1000 MG/1
TABLET ORAL
Qty: 180 TAB | Refills: 3 | OUTPATIENT
Start: 2020-12-02

## 2020-12-02 RX ORDER — LISINOPRIL AND HYDROCHLOROTHIAZIDE 12.5; 2 MG/1; MG/1
TABLET ORAL
Qty: 180 TAB | Refills: 0 | OUTPATIENT
Start: 2020-12-02

## 2020-12-02 RX ORDER — SIMVASTATIN 40 MG/1
40 TABLET, FILM COATED ORAL
Qty: 90 TAB | Refills: 0 | OUTPATIENT
Start: 2020-12-02

## 2020-12-11 ENCOUNTER — OFFICE VISIT (OUTPATIENT)
Dept: FAMILY MEDICINE CLINIC | Age: 65
End: 2020-12-11
Payer: MEDICARE

## 2020-12-11 VITALS
HEART RATE: 84 BPM | HEIGHT: 62 IN | WEIGHT: 191 LBS | SYSTOLIC BLOOD PRESSURE: 112 MMHG | DIASTOLIC BLOOD PRESSURE: 79 MMHG | OXYGEN SATURATION: 98 % | TEMPERATURE: 98 F | RESPIRATION RATE: 16 BRPM | BODY MASS INDEX: 35.15 KG/M2

## 2020-12-11 DIAGNOSIS — M12.811 ROTATOR CUFF ARTHROPATHY OF RIGHT SHOULDER: ICD-10-CM

## 2020-12-11 DIAGNOSIS — E78.00 HYPERCHOLESTEROLEMIA: ICD-10-CM

## 2020-12-11 DIAGNOSIS — Z12.31 ENCOUNTER FOR SCREENING MAMMOGRAM FOR MALIGNANT NEOPLASM OF BREAST: ICD-10-CM

## 2020-12-11 DIAGNOSIS — I10 ESSENTIAL HYPERTENSION: Primary | ICD-10-CM

## 2020-12-11 DIAGNOSIS — Z13.31 SCREENING FOR DEPRESSION: ICD-10-CM

## 2020-12-11 DIAGNOSIS — E11.9 TYPE 2 DIABETES MELLITUS WITHOUT COMPLICATION, WITHOUT LONG-TERM CURRENT USE OF INSULIN (HCC): ICD-10-CM

## 2020-12-11 DIAGNOSIS — Z23 ENCOUNTER FOR IMMUNIZATION: ICD-10-CM

## 2020-12-11 DIAGNOSIS — Z23 NEEDS FLU SHOT: ICD-10-CM

## 2020-12-11 DIAGNOSIS — Z00.00 MEDICARE ANNUAL WELLNESS VISIT, SUBSEQUENT: ICD-10-CM

## 2020-12-11 PROCEDURE — G8427 DOCREV CUR MEDS BY ELIG CLIN: HCPCS | Performed by: FAMILY MEDICINE

## 2020-12-11 PROCEDURE — G0009 ADMIN PNEUMOCOCCAL VACCINE: HCPCS | Performed by: FAMILY MEDICINE

## 2020-12-11 PROCEDURE — G9899 SCRN MAM PERF RSLTS DOC: HCPCS | Performed by: FAMILY MEDICINE

## 2020-12-11 PROCEDURE — G8754 DIAS BP LESS 90: HCPCS | Performed by: FAMILY MEDICINE

## 2020-12-11 PROCEDURE — G8752 SYS BP LESS 140: HCPCS | Performed by: FAMILY MEDICINE

## 2020-12-11 PROCEDURE — 90686 IIV4 VACC NO PRSV 0.5 ML IM: CPT | Performed by: FAMILY MEDICINE

## 2020-12-11 PROCEDURE — G8417 CALC BMI ABV UP PARAM F/U: HCPCS | Performed by: FAMILY MEDICINE

## 2020-12-11 PROCEDURE — G9717 DOC PT DX DEP/BP F/U NT REQ: HCPCS | Performed by: FAMILY MEDICINE

## 2020-12-11 PROCEDURE — 90732 PPSV23 VACC 2 YRS+ SUBQ/IM: CPT | Performed by: FAMILY MEDICINE

## 2020-12-11 PROCEDURE — 3046F HEMOGLOBIN A1C LEVEL >9.0%: CPT | Performed by: FAMILY MEDICINE

## 2020-12-11 PROCEDURE — 2022F DILAT RTA XM EVC RTNOPTHY: CPT | Performed by: FAMILY MEDICINE

## 2020-12-11 PROCEDURE — 99214 OFFICE O/P EST MOD 30 MIN: CPT | Performed by: FAMILY MEDICINE

## 2020-12-11 PROCEDURE — G0439 PPPS, SUBSEQ VISIT: HCPCS | Performed by: FAMILY MEDICINE

## 2020-12-11 PROCEDURE — 3017F COLORECTAL CA SCREEN DOC REV: CPT | Performed by: FAMILY MEDICINE

## 2020-12-11 PROCEDURE — G0008 ADMIN INFLUENZA VIRUS VAC: HCPCS | Performed by: FAMILY MEDICINE

## 2020-12-11 RX ORDER — LISINOPRIL AND HYDROCHLOROTHIAZIDE 12.5; 2 MG/1; MG/1
TABLET ORAL
Qty: 180 TAB | Refills: 3 | Status: SHIPPED | OUTPATIENT
Start: 2020-12-11 | End: 2021-05-17

## 2020-12-11 RX ORDER — METFORMIN HYDROCHLORIDE 1000 MG/1
TABLET ORAL
Qty: 180 TAB | Refills: 0 | Status: CANCELLED | OUTPATIENT
Start: 2020-12-11

## 2020-12-11 RX ORDER — SIMVASTATIN 40 MG/1
TABLET, FILM COATED ORAL
Qty: 90 TAB | Refills: 3 | Status: SHIPPED | OUTPATIENT
Start: 2020-12-11 | End: 2021-12-15

## 2020-12-11 NOTE — PROGRESS NOTES
Chief Complaint   Patient presents with   [address]     Patient presents in office today for f/u and fasting labs. Would like to get a flu shot. Has c/o right shoulder pain. No concerns. Pt / caregiver given opportunity to review vaccine information sheet prior to vaccine administration. Opportunity given for questions and concerns. No questions or concerns at this time. 1. Have you been to the ER, urgent care clinic since your last visit? Hospitalized since your last visit? No    2. Have you seen or consulted any other health care providers outside of the [address] [place] [address] since your last visit? Include any pap smears or colon screening.  No    Learning Assessment 2/9/2016   PRIMARY LEARNER Patient   HIGHEST LEVEL OF EDUCATION - PRIMARY LEARNER  GRADUATED HIGH SCHOOL OR GED   BARRIERS PRIMARY LEARNER NONE     -   CO-LEARNER CAREGIVER No   PRIMARY LANGUAGE ENGLISH   LEARNER PREFERENCE PRIMARY DEMONSTRATION   ANSWERED BY pt   RELATIONSHIP SELF

## 2020-12-11 NOTE — PROGRESS NOTES
Progress Note    she is a 60y.o. year old female who presents for evalution. Subjective:     Patient here for diabetes follow-up previous hemoglobin A1c from a year ago was 9.7. Uncontrolled currently just on Metformin twice daily. LDL has been at goal on simvastatin. Patient denies any myalgias or arthralgias associated with the medication. Although her shoulder is bothering her today. Kidney function was checked at ED in oct and was off will hold off on metformin for now. Pt has not been taking past couple weeks. Pretension remains controlled on medication. Pt has cologuard at home and reminded to do this. Reviewed PmHx, RxHx, FmHx, SocHx, AllgHx and updated and dated in the chart. Review of Systems - negative except as listed above in the HPI    Objective:     Vitals:    12/11/20 0716   BP: 112/79   Pulse: 84   Resp: 16   Temp: 98 °F (36.7 °C)   TempSrc: Oral   SpO2: 98%   Weight: 191 lb (86.6 kg)   Height: 5' 2\" (1.575 m)       Current Outpatient Medications   Medication Sig    lisinopril-hydroCHLOROthiazide (PRINZIDE, ZESTORETIC) 20-12.5 mg per tablet Take 1 tablet by mouth twice daily    simvastatin (ZOCOR) 40 mg tablet Take 1 tablet by mouth nightly    metFORMIN (GLUCOPHAGE) 1,000 mg tablet TAKE 1 TABLET BY MOUTH TWICE DAILY WITH MEALS    PARoxetine (PAXIL) 30 mg tablet Take 1 tablet by mouth once daily    diclofenac (Voltaren) 1 % gel Apply 4 g to affected area four (4) times daily.  lidocaine (Lidoderm) 5 % Apply patch to the affected area for 12 hours a day and remove for 12 hours a day.  pantoprazole (PROTONIX) 40 mg tablet Take 1 tablet by mouth once daily    allopurinoL (ZYLOPRIM) 300 mg tablet Take 1 tablet by mouth once daily    pioglitazone (ACTOS) 30 mg tablet Take 1 Tab by mouth daily.  acetaminophen (TYLENOL) 325 mg tablet Take  by mouth every four (4) hours as needed for Pain.     Insulin Needles, Disposable, (PEN NEEDLE) 31 gauge x 3/16\" ndle QHS use of lantus    Blood-Glucose Meter monitoring kit Please dispense one kit    glucose blood VI test strips (BLOOD GLUCOSE TEST) strip Use four times a day, before meals and at bedtime, to check your blood sugar.  ALPRAZolam (XANAX) 0.25 mg tablet Take 0.25 mg by mouth as needed for Anxiety.  repaglinide (PRANDIN) 2 mg tablet Take 1 Tab by mouth Before breakfast, lunch, and dinner.  omeprazole (PRILOSEC) 20 mg capsule Take 20 mg by mouth daily. No current facility-administered medications for this visit. Physical Examination: General appearance - alert, well appearing, and in no distress  Mental status - alert, oriented to person, place, and time  Chest - clear to auscultation, no wheezes, rales or rhonchi, symmetric air entry  Heart - normal rate, regular rhythm, normal S1, S2, no murmurs, rubs, clicks or gallops  Neurological - alert, oriented, normal speech, no focal findings or movement disorder noted  Extremities - peripheral pulses normal, no pedal edema, no clubbing or cyanosis, feet normal, good pulses, normal color, temperature and sensation, monofilament sensory exam is normal in both feet. Musculoskeletal-patient unable to lift arm past 90 degrees on the right with AB duction. Tenderness anterior shoulder joint. Assessment/ Plan:   Diagnoses and all orders for this visit:    1. Essential hypertension  -     lisinopril-hydroCHLOROthiazide (PRINZIDE, ZESTORETIC) 20-12.5 mg per tablet; Take 1 tablet by mouth twice daily  -     METABOLIC PANEL, COMPREHENSIVE; Future    2. Type 2 diabetes mellitus without complication, without long-term current use of insulin ([facility])  -     LIPID PANEL; Future  -     METABOLIC PANEL, COMPREHENSIVE; Future  -     HEMOGLOBIN A1C WITH EAG; Future  -     MICROALBUMIN, UR, RAND W/ MICROALB/CREAT RATIO; Future  -     HM DIABETES FOOT EXAM    3. Hypercholesterolemia  -     simvastatin (ZOCOR) 40 mg tablet;  Take 1 tablet by mouth nightly  -     LIPID PANEL; Future  -     METABOLIC PANEL, COMPREHENSIVE; Future    4. Medicare annual wellness visit, subsequent    5. Screening for depression  -     DEPRESSION SCREEN ANNUAL    6. Encounter for screening mammogram for malignant neoplasm of breast  -     PATY MAMMO BI SCREENING INCL CAD; Future    7. Needs flu shot  -     INFLUENZA VIRUS VAC QUAD,SPLIT,PRESV FREE SYRINGE IM    8. Encounter for immunization  -     PNEUMOCOCCAL POLYSACCHARIDE VACCINE, 23-VALENT, ADULT OR IMMUNOSUPPRESSED PT DOSE,  -     ADMIN INFLUENZA VIRUS VAC  -     ADMIN PNEUMOCOCCAL VACCINE    9. Rotator cuff arthropathy of right shoulder  -     REFERRAL TO ORTHOPEDICS     Pt is unable to access her mychart  Follow-up and Dispositions    · Return in about 3 months (around 3/11/2021), or if symptoms worsen or fail to improve. I have discussed the diagnosis with the patient and the intended plan as seen in the above orders. The patient has received an after-visit summary and questions were answered concerning future plans. Pt conveyed understanding of plan. Medication Side Effects and Warnings were discussed with patient      Kristy Alexander, DO      This is the Subsequent Medicare Annual Wellness Exam, performed 12 months or more after the Initial AWV or the last Subsequent AWV    I have reviewed the patient's medical history in detail and updated the computerized patient record. Depression Risk Factor Screening:     3 most recent PHQ Screens 12/11/2020   Little interest or pleasure in doing things Not at all   Feeling down, depressed, irritable, or hopeless Not at all   Total Score PHQ 2 0       Alcohol Risk Screen   Do you average more than 1 drink per night or more than 7 drinks a week:  No    On any one occasion in the past three months have you have had more than 3 drinks containing alcohol:  No        Functional Ability and Level of Safety:   Hearing: Hearing is good. Activities of Daily Living:   The home contains: no safety equipment. Patient does total self care     Ambulation: with no difficulty     Fall Risk:  No flowsheet data found. Abuse Screen:  Patient is not abused       Cognitive Screening   Has your family/caregiver stated any concerns about your memory: no         Assessment/Plan   Education and counseling provided:  Are appropriate based on today's review and evaluation  Influenza Vaccine  Screening Mammography    Diagnoses and all orders for this visit:    1. Essential hypertension  -     lisinopril-hydroCHLOROthiazide (PRINZIDE, ZESTORETIC) 20-12.5 mg per tablet; Take 1 tablet by mouth twice daily  -     METABOLIC PANEL, COMPREHENSIVE; Future    2. Type 2 diabetes mellitus without complication, without long-term current use of insulin (HCC)  -     LIPID PANEL; Future  -     METABOLIC PANEL, COMPREHENSIVE; Future  -     HEMOGLOBIN A1C WITH EAG; Future  -     MICROALBUMIN, UR, RAND W/ MICROALB/CREAT RATIO; Future  -      DIABETES FOOT EXAM    3. Hypercholesterolemia  -     simvastatin (ZOCOR) 40 mg tablet; Take 1 tablet by mouth nightly  -     LIPID PANEL; Future  -     METABOLIC PANEL, COMPREHENSIVE; Future    4. Medicare annual wellness visit, subsequent    5. Screening for depression  -     DEPRESSION SCREEN ANNUAL    6. Encounter for screening mammogram for malignant neoplasm of breast  -     PATY MAMMO BI SCREENING INCL CAD; Future    7. Needs flu shot  -     INFLUENZA VIRUS VAC QUAD,SPLIT,PRESV FREE SYRINGE IM    8. Encounter for immunization  -     PNEUMOCOCCAL POLYSACCHARIDE VACCINE, 23-VALENT, ADULT OR IMMUNOSUPPRESSED PT DOSE,  -     ADMIN INFLUENZA VIRUS VAC  -     ADMIN PNEUMOCOCCAL VACCINE    9.  Rotator cuff arthropathy of right shoulder  -     REFERRAL TO ORTHOPEDICS        Health Maintenance Due     Health Maintenance Due   Topic Date Due    Pneumococcal 0-64 years (1 of 1 - PPSV23) 09/01/1962    PAP AKA CERVICAL CYTOLOGY  09/01/1977    Shingrix Vaccine Age 50> (1 of 2) 09/01/2006    Colorectal Cancer Screening Combo  09/01/2006    Breast Cancer Screen Mammogram  09/01/2006    Eye Exam Retinal or Dilated  11/06/2017    Foot Exam Q1  09/21/2019    Flu Vaccine (1) 09/01/2020    Medicare Yearly Exam  10/29/2020    A1C test (Diabetic or Prediabetic)  10/29/2020    MICROALBUMIN Q1  10/29/2020    Lipid Screen  10/29/2020       Patient Care Team   Patient Care Team:  Adrienne Newell DO as PCP - General (Family Medicine)  Adrienne Newell DO as PCP - [facility] Empaneled Provider    History     Patient Active Problem List   Diagnosis Code    DM type 2 (diabetes mellitus, type 2) ([facility] E11.9    HTN (hypertension) I10    Hypercholesterolemia E78.00    Psoriatic arthritis ([facility] L40.50    Psoriasis L40.9    GERD (gastroesophageal reflux disease) K21.9    Personal history of gout Z87.39    Depression F32.9    UTI (lower urinary tract infection) N39.0     Past Medical History:   Diagnosis Date    Arthritis     Diabetes ([facility]     Gout     Hernia, hiatal     Hypertension       Past Surgical History:   Procedure Laterality Date    HX KNEE REPLACEMENT Right      Current Outpatient Medications   Medication Sig Dispense Refill    lisinopril-hydroCHLOROthiazide (PRINZIDE, ZESTORETIC) 20-12.5 mg per tablet Take 1 tablet by mouth twice daily 180 Tab 3    simvastatin (ZOCOR) 40 mg tablet Take 1 tablet by mouth nightly 90 Tab 3    metFORMIN (GLUCOPHAGE) 1,000 mg tablet TAKE 1 TABLET BY MOUTH TWICE DAILY WITH MEALS 180 Tab 0    PARoxetine (PAXIL) 30 mg tablet Take 1 tablet by mouth once daily 90 Tab 0    diclofenac (Voltaren) 1 % gel Apply 4 g to affected area four (4) times daily. 100 g 0    lidocaine (Lidoderm) 5 % Apply patch to the affected area for 12 hours a day and remove for 12 hours a day.  30 Each 0    pantoprazole (PROTONIX) 40 mg tablet Take 1 tablet by mouth once daily 90 Tab 3    allopurinoL (ZYLOPRIM) 300 mg tablet Take 1 tablet by mouth once daily 90 Tab 0    pioglitazone (ACTOS) 30 mg tablet Take 1 Tab by mouth daily. 90 Tab 3    acetaminophen (TYLENOL) 325 mg tablet Take  by mouth every four (4) hours as needed for Pain.  Insulin Needles, Disposable, (PEN NEEDLE) 31 gauge x 3/16\" ndle QHS use of lantus 30 Pen Needle 11    Blood-Glucose Meter monitoring kit Please dispense one kit 1 Kit 0    glucose blood VI test strips (BLOOD GLUCOSE TEST) strip Use four times a day, before meals and at bedtime, to check your blood sugar. 100 Strip 11    ALPRAZolam (XANAX) 0.25 mg tablet Take 0.25 mg by mouth as needed for Anxiety.  repaglinide (PRANDIN) 2 mg tablet Take 1 Tab by mouth Before breakfast, lunch, and dinner. 90 Tab 5    omeprazole (PRILOSEC) 20 mg capsule Take 20 mg by mouth daily. Allergies   Allergen Reactions    Sulfa (Sulfonamide Antibiotics) Other (comments)     Emesis       Family History   Problem Relation Age of Onset    Cancer Mother     Heart Disease Father     Diabetes Maternal Grandmother      Social History     Tobacco Use    Smoking status: Never Smoker    Smokeless tobacco: Never Used   Substance Use Topics    Alcohol use: No   I have discussed the diagnosis with the patient and the intended plan as seen in the above orders. The patient has received an after-visit summary and questions were answered concerning future plans. Pt conveyed understanding of plan.       Dr Sujit Glynn

## 2020-12-11 NOTE — PATIENT INSTRUCTIONS
Medicare Wellness Visit, Female The best way to live healthy is to have a lifestyle where you eat a well-balanced diet, exercise regularly, limit alcohol use, and quit all forms of tobacco/nicotine, if applicable. Regular preventive services are another way to keep healthy. Preventive services (vaccines, screening tests, monitoring & exams) can help personalize your care plan, which helps you manage your own care. Screening tests can find health problems at the earliest stages, when they are easiest to treat. [facility] follows the current, evidence-based guidelines published by the [facility] ([facility]) when recommending preventive services for our patients. Because we follow these guidelines, sometimes recommendations change over time as research supports it. (For example, mammograms used to be recommended annually. Even though Medicare will still pay for an annual mammogram, the newer guidelines recommend a mammogram every two years for women of average risk). Of course, you and your doctor may decide to screen more often for some diseases, based on your risk and your co-morbidities (chronic disease you are already diagnosed with). Preventive services for you include: - Medicare offers their members a free annual wellness visit, which is time for you and your primary care provider to discuss and plan for your preventive service needs. Take advantage of this benefit every year! 
-All adults over the age of 72 should receive the recommended pneumonia vaccines. Current USPSTF guidelines recommend a series of two vaccines for the best pneumonia protection.  
-All adults should have a flu vaccine yearly and a tetanus vaccine every 10 years.  
-All adults age 48 and older should receive the shingles vaccines (series of two vaccines). -All adults age 38-68 who are overweight should have a diabetes screening test once every three years. -All adults born between [birth year] and 1965 should be screened once for Hepatitis C. 
-Other screening tests and preventive services for persons with diabetes include: an eye exam to screen for diabetic retinopathy, a kidney function test, a foot exam, and stricter control over your cholesterol.  
-Cardiovascular screening for adults with routine risk involves an electrocardiogram (ECG) at intervals determined by your doctor.  
-Colorectal cancer screenings should be done for adults age 54-65 with no increased risk factors for colorectal cancer. There are a number of acceptable methods of screening for this type of cancer. Each test has its own benefits and drawbacks. Discuss with your doctor what is most appropriate for you during your annual wellness visit. The different tests include: colonoscopy (considered the best screening method), a fecal occult blood test, a fecal DNA test, and sigmoidoscopy. 
 
-A bone mass density test is recommended when a woman turns 65 to screen for osteoporosis. This test is only recommended one time, as a screening. Some providers will use this same test as a disease monitoring tool if you already have osteoporosis. -Breast cancer screenings are recommended every other year for women of normal risk, age 54-69. 
-Cervical cancer screenings for women over age 72 are only recommended with certain risk factors. Here is a list of your current Health Maintenance items (your personalized list of preventive services) with a due date: 
Health Maintenance Due Topic Date Due  Pneumococcal Vaccine (1 of 1 - PPSV23) 09[birth year]01/1962  Pap Test  09/01/1977  Shingles Vaccine (1 of 2) 09/01/2006  Colorectal Screening  09/01/2006  Mammogram  09/01/2006 Claire Esteban Eye Exam  11/06/2017  Diabetic Foot Care  09/21/2019  Yearly Flu Vaccine (1) 09/01/2020 Claire Esteban Annual Well Visit  10/29/2020  Hemoglobin A1C    10/29/2020  Albumin Urine Test  10/29/2020  Cholesterol Test   10/29/2020

## 2020-12-12 LAB
ALBUMIN SERPL-MCNC: 4 G/DL (ref 3.5–5)
ALBUMIN/GLOB SERPL: 1.5 {RATIO} (ref 1.1–2.2)
ALP SERPL-CCNC: 78 U/L (ref 45–117)
ALT SERPL-CCNC: 21 U/L (ref 12–78)
ANION GAP SERPL CALC-SCNC: 6 MMOL/L (ref 5–15)
AST SERPL-CCNC: 18 U/L (ref 15–37)
BILIRUB SERPL-MCNC: 0.2 MG/DL (ref 0.2–1)
BUN SERPL-MCNC: 27 MG/DL (ref 6–20)
BUN/CREAT SERPL: 17 (ref 12–20)
CALCIUM SERPL-MCNC: 9.8 MG/DL (ref 8.5–10.1)
CHLORIDE SERPL-SCNC: 106 MMOL/L (ref 97–108)
CHOLEST SERPL-MCNC: 181 MG/DL
CO2 SERPL-SCNC: 29 MMOL/L (ref 21–32)
CREAT SERPL-MCNC: 1.58 MG/DL (ref 0.55–1.02)
CREAT UR-MCNC: 166 MG/DL
EST. AVERAGE GLUCOSE BLD GHB EST-MCNC: 154 MG/DL
GLOBULIN SER CALC-MCNC: 2.7 G/DL (ref 2–4)
GLUCOSE SERPL-MCNC: 200 MG/DL (ref 65–100)
HBA1C MFR BLD: 7 % (ref 4–5.6)
HDLC SERPL-MCNC: 64 MG/DL
HDLC SERPL: 2.8 {RATIO} (ref 0–5)
LDLC SERPL CALC-MCNC: 89.4 MG/DL (ref 0–100)
LIPID PROFILE,FLP: NORMAL
MICROALBUMIN UR-MCNC: 1.87 MG/DL
MICROALBUMIN/CREAT UR-RTO: 11 MG/G (ref 0–30)
POTASSIUM SERPL-SCNC: 4.8 MMOL/L (ref 3.5–5.1)
PROT SERPL-MCNC: 6.7 G/DL (ref 6.4–8.2)
SODIUM SERPL-SCNC: 141 MMOL/L (ref 136–145)
TRIGL SERPL-MCNC: 138 MG/DL (ref ?–150)
VLDLC SERPL CALC-MCNC: 27.6 MG/DL

## 2020-12-14 DIAGNOSIS — Z12.31 ENCOUNTER FOR SCREENING MAMMOGRAM FOR MALIGNANT NEOPLASM OF BREAST: ICD-10-CM

## 2020-12-23 RX ORDER — ALLOPURINOL 300 MG/1
TABLET ORAL
Qty: 90 TAB | Refills: 0 | Status: SHIPPED | OUTPATIENT
Start: 2020-12-23 | End: 2020-12-29

## 2020-12-29 RX ORDER — ALLOPURINOL 300 MG/1
TABLET ORAL
Qty: 90 TAB | Refills: 0 | Status: SHIPPED | OUTPATIENT
Start: 2020-12-29 | End: 2021-07-27

## 2021-01-04 DIAGNOSIS — F32.A DEPRESSION, UNSPECIFIED DEPRESSION TYPE: ICD-10-CM

## 2021-01-04 RX ORDER — PAROXETINE 30 MG/1
TABLET, FILM COATED ORAL
Qty: 90 TAB | Refills: 0 | Status: SHIPPED | OUTPATIENT
Start: 2021-01-04 | End: 2021-04-07

## 2021-03-30 ENCOUNTER — VIRTUAL VISIT (OUTPATIENT)
Dept: FAMILY MEDICINE CLINIC | Age: 66
End: 2021-03-30
Payer: MEDICARE

## 2021-03-30 DIAGNOSIS — N34.2 INFECTIVE URETHRITIS: Primary | ICD-10-CM

## 2021-03-30 PROCEDURE — 99213 OFFICE O/P EST LOW 20 MIN: CPT | Performed by: FAMILY MEDICINE

## 2021-03-30 PROCEDURE — 3017F COLORECTAL CA SCREEN DOC REV: CPT | Performed by: FAMILY MEDICINE

## 2021-03-30 PROCEDURE — G8756 NO BP MEASURE DOC: HCPCS | Performed by: FAMILY MEDICINE

## 2021-03-30 PROCEDURE — G9899 SCRN MAM PERF RSLTS DOC: HCPCS | Performed by: FAMILY MEDICINE

## 2021-03-30 PROCEDURE — G9717 DOC PT DX DEP/BP F/U NT REQ: HCPCS | Performed by: FAMILY MEDICINE

## 2021-03-30 PROCEDURE — G8427 DOCREV CUR MEDS BY ELIG CLIN: HCPCS | Performed by: FAMILY MEDICINE

## 2021-03-30 PROCEDURE — G8417 CALC BMI ABV UP PARAM F/U: HCPCS | Performed by: FAMILY MEDICINE

## 2021-03-30 RX ORDER — NITROFURANTOIN 25; 75 MG/1; MG/1
100 CAPSULE ORAL 2 TIMES DAILY
Qty: 10 CAP | Refills: 0 | Status: SHIPPED | OUTPATIENT
Start: 2021-03-30 | End: 2021-04-01

## 2021-03-30 NOTE — PROGRESS NOTES
Progress Note    she is a 60y.o. year old female who presents for evalution. Subjective: With increasing urinary frequency and now she is having urgency. When she feels like she needs go the bathroom she does not go right away she will have an accident on herself. No fevers no chills. No back pain no suprapubic pain. She has been checking her sugars and states her sugars have been running normal for her. No hypoglycemic episodes. Reviewed PmHx, RxHx, FmHx, SocHx, AllgHx and updated and dated in the chart. Review of Systems - negative except as listed above in the HPI    Objective: There were no vitals filed for this visit. Current Outpatient Medications   Medication Sig    nitrofurantoin, macrocrystal-monohydrate, (Macrobid) 100 mg capsule Take 1 Cap by mouth two (2) times a day for 5 days.  PARoxetine (PAXIL) 30 mg tablet Take 1 tablet by mouth once daily    allopurinoL (ZYLOPRIM) 300 mg tablet Take 1 tablet by mouth once daily    lisinopril-hydroCHLOROthiazide (PRINZIDE, ZESTORETIC) 20-12.5 mg per tablet Take 1 tablet by mouth twice daily    simvastatin (ZOCOR) 40 mg tablet Take 1 tablet by mouth nightly    metFORMIN (GLUCOPHAGE) 1,000 mg tablet TAKE 1 TABLET BY MOUTH TWICE DAILY WITH MEALS    diclofenac (Voltaren) 1 % gel Apply 4 g to affected area four (4) times daily.  lidocaine (Lidoderm) 5 % Apply patch to the affected area for 12 hours a day and remove for 12 hours a day.  pantoprazole (PROTONIX) 40 mg tablet Take 1 tablet by mouth once daily    pioglitazone (ACTOS) 30 mg tablet Take 1 Tab by mouth daily.  repaglinide (PRANDIN) 2 mg tablet Take 1 Tab by mouth Before breakfast, lunch, and dinner.  acetaminophen (TYLENOL) 325 mg tablet Take  by mouth every four (4) hours as needed for Pain.     Insulin Needles, Disposable, (PEN NEEDLE) 31 gauge x 3/16\" ndle QHS use of lantus    Blood-Glucose Meter monitoring kit Please dispense one kit    glucose blood VI test strips (BLOOD GLUCOSE TEST) strip Use four times a day, before meals and at bedtime, to check your blood sugar.  ALPRAZolam (XANAX) 0.25 mg tablet Take 0.25 mg by mouth as needed for Anxiety.  omeprazole (PRILOSEC) 20 mg capsule Take 20 mg by mouth daily. No current facility-administered medications for this visit. Physical Examination: General appearance - alert, well appearing, and in no distress  Mental status - alert, oriented to person, place, and time      Assessment/ Plan:   Diagnoses and all orders for this visit:    1. Infective urethritis  -     nitrofurantoin, macrocrystal-monohydrate, (Macrobid) 100 mg capsule; Take 1 Cap by mouth two (2) times a day for 5 days. Will treat as UTI if not improving with the Macrobid patient will contact office and will order urine culture  Follow-up and Dispositions    · Return if symptoms worsen or fail to improve. I have discussed the diagnosis with the patient and the intended plan as seen in the above orders. The patient has received an after-visit summary and questions were answered concerning future plans. Pt conveyed understanding of plan. Medication Side Effects and Warnings were discussed with patient      Yordan Andrade is being evaluated by a Virtual Visit (video visit) encounter to address concerns as mentioned above. A caregiver was present when appropriate. Due to this being a TeleHealth encounter (During OLFZY-06 public health emergency), evaluation of the following organ systems was limited: Vitals/Constitutional/EENT/Resp/CV/GI//MS/Neuro/Skin/Heme-Lymph-Imm.   Pursuant to the emergency declaration under the [address] [place] [address], [address] [place] [address] authority and the [employer] Act, this Virtual Visit was conducted with patient's (and/or legal guardian's) consent, to reduce the patient's risk of exposure to COVID-19 and provide necessary medical care. The patient (and/or legal guardian) has also been advised to contact this office for worsening conditions or problems, and seek emergency medical treatment and/or call 911 if deemed necessary. Patient identification was verified at the start of the visit: Yes    Services were provided through a video synchronous discussion virtually to substitute for in-person clinic visit. Patient and provider were located at their individual homes.   --Samantha Thomas DO on 3/30/2021 at 11:11 AM

## 2021-03-30 NOTE — PATIENT INSTRUCTIONS
A Healthy Lifestyle: Care Instructions Your Care Instructions A healthy lifestyle can help you feel good, stay at a healthy weight, and have plenty of energy for both work and play. A healthy lifestyle is something you can share with your whole family. A healthy lifestyle also can lower your risk for serious health problems, such as high blood pressure, heart disease, and diabetes. You can follow a few steps listed below to improve your health and the health of your family. Follow-up care is a key part of your treatment and safety. Be sure to make and go to all appointments, and call your doctor if you are having problems. It's also a good idea to know your test results and keep a list of the medicines you take. How can you care for yourself at home? · Do not eat too much sugar, fat, or fast foods. You can still have dessert and treats now and then. The goal is moderation. · Start small to improve your eating habits. Pay attention to portion sizes, drink less juice and soda pop, and eat more fruits and vegetables. ? Eat a healthy amount of food. A 3-ounce serving of meat, for example, is about the size of a deck of cards. Fill the rest of your plate with vegetables and whole grains. ? Limit the amount of soda and sports drinks you have every day. Drink more water when you are thirsty. ? Eat at least 5 servings of fruits and vegetables every day. It may seem like a lot, but it is not hard to reach this goal. A serving or helping is 1 piece of fruit, 1 cup of vegetables, or 2 cups of leafy, raw vegetables. Have an apple or some carrot sticks as an afternoon snack instead of a candy bar. Try to have fruits and/or vegetables at every meal. 
· Make exercise part of your daily routine. You may want to start with simple activities, such as walking, bicycling, or slow swimming. Try to be active 30 to 60 minutes every day. You do not need to do all 30 to 60 minutes all at once.  For example, you can exercise 3 times a day for 10 or 20 minutes. Moderate exercise is safe for most people, but it is always a good idea to talk to your doctor before starting an exercise program. 
· Keep moving. Derinda Leyden the lawn, work in the garden, or [employer]. Take the stairs instead of the elevator at work. · If you smoke, quit. People who smoke have an increased risk for heart attack, stroke, cancer, and other lung illnesses. Quitting is hard, but there are ways to boost your chance of quitting tobacco for good. ? Use nicotine gum, patches, or lozenges. ? Ask your doctor about stop-smoking programs and medicines. ? Keep trying. In addition to reducing your risk of diseases in the future, you will notice some benefits soon after you stop using tobacco. If you have shortness of breath or asthma symptoms, they will likely get better within a few weeks after you quit. · Limit how much alcohol you drink. Moderate amounts of alcohol (up to 2 drinks a day for men, 1 drink a day for women) are okay. But drinking too much can lead to liver problems, high blood pressure, and other health problems. Family health If you have a family, there are many things you can do together to improve your health. · Eat meals together as a family as often as possible. · Eat healthy foods. This includes fruits, vegetables, lean meats and dairy, and whole grains. · Include your family in your fitness plan. Most people think of activities such as jogging or tennis as the way to fitness, but there are many ways you and your family can be more active. Anything that makes you breathe hard and gets your heart pumping is exercise. Here are some tips: 
? Walk to do errands or to take your child to school or the bus. 
? Go for a family bike ride after dinner instead of watching TV. Where can you learn more? Go to http://www.gray.com/ Enter I053 in the search box to learn more about \"A Healthy Lifestyle: Care Instructions. \" Current as of: January 31, 2020               Content Version: 12.6 © 5898-4871 [employer]. Care instructions adapted under license by [employer] (which disclaims liability or warranty for this information). If you have questions about a medical condition or this instruction, always ask your healthcare professional. [address] any warranty or liability for your use of this information.

## 2021-04-01 ENCOUNTER — TELEPHONE (OUTPATIENT)
Dept: FAMILY MEDICINE CLINIC | Age: 66
End: 2021-04-01

## 2021-04-01 RX ORDER — CIPROFLOXACIN 500 MG/1
500 TABLET ORAL 2 TIMES DAILY
Qty: 10 TAB | Refills: 0 | Status: SHIPPED | OUTPATIENT
Start: 2021-04-01 | End: 2021-04-06

## 2021-04-01 NOTE — TELEPHONE ENCOUNTER
Patient states that she has a severe headache & vomiting since taking marobid for uti. She is not feeling any better. Can med be causing this? If so can you change to something else?

## 2021-04-01 NOTE — TELEPHONE ENCOUNTER
Called and spoke with patient. Advised to d/c the Macrobid and start the Cipro that was sent in. Patient verbalized understanding.

## 2021-04-06 DIAGNOSIS — F32.A DEPRESSION, UNSPECIFIED DEPRESSION TYPE: ICD-10-CM

## 2021-04-07 RX ORDER — PAROXETINE 30 MG/1
TABLET, FILM COATED ORAL
Qty: 90 TAB | Refills: 4 | Status: SHIPPED | OUTPATIENT
Start: 2021-04-07 | End: 2021-07-12 | Stop reason: SDUPTHER

## 2021-04-19 NOTE — PROGRESS NOTES
4/4/17, Patient listed on Daily Census, alert of [facility]/hospitalization. This writer/NN contacted [facility], [phone], spoke to [place] to explain above notification and need to have Dr. Vijaya Lemon attached to [address], HIPAA verified with 2 identifers. Manolo Tian understanding and agrees to have Dr Vijaya Lemon added to [facility]/Medical Record. 4/4/17, This writer/NN and [facility] access, able to confirm, patient hospitalized, 3/28/17 - 3/31/17 at [facility] related to Right Knee Osteoarthritis/Right Total Knee Arthroplasty by Dr Herlinda Ruiz.  Printed Operative Report, Consult Note and Discharge Summary for Dr Vijaya Lemon to review. Per Discharge Summary:  Tylenol for pain control, Narcotics causing pt to be lethargic. SCD's and ASA 325mg BID for DVT proph x 4 weeks. Hyponatremic post op(Na at 127 on 3/30/17 and Na at 130 on 3/31/17), fluid restriction, 2000cc/no free water and PCP F/U. Patient last seen by Dr Vijaya Lemon, 1/24/17. Past Medical History Includes: Diabetes(11/2016 A1C at 7.9, up with cortisone injection), Hyperlipidemia, HTN, Psoriasis, Arthritis/Pain, and Depression. 4/4/17, This writer/NN attempted to contact patient at listed phone number, left voicemail message with [facility]/NN contact information and request for return call. PLAN:  Should patient return call or be seen for F/U appt, discuss NN role and purpose of call, post hospitalization. Discuss discharge instructions, further symptoms or concerns, medications(Compliance and Reconciliation), recent blood sugar readings, HH progress/?agency, and F/U appts. Provide instruction, goal work and resource connection as indicated. Chief Complaint:  The patient present for routine OB visit    History of the Present Illness:  The patient is a 33 year-old  at 32-6/7 weeks gestation who presents for routine OB visit    She denies bleeding.  She denies leakage of fluid.  She denies severe abdominal pain.  She reports good fetal movements.      At her visit on , she was noting increased lower extremity edema over the previous week.  She denied headache or change in vision.  She denied epigastric or right upper quadrant pain.  Her initial BP at that visit was 142/78, though repeat was normal.  She did have normal PIH labs that day including normal urine protein/creatinine ratio.  I did recommend echocardiogram, and referral was placed.  This is scheduled for May 5th.      Of note, at her follow up visit last week on , she reports that her edema had resolved and her BP was normal.    She again denies any headache or change in vision.  She denies epigastric pain or right upper quadrant pain.  She denies chest pain or shortness of breath.  Her BP is normal today.    Her OB history is significant for a primary low transverse  in  secondary to breech presentation.  She did have a failed external cephalic version at 37 weeks and then presented with spontaneous labor at approximately 39 weeks.  She had a female infant weighing 3245 grams    She does have a history of type II DM, chronic hypertension, and CKD, stage II, followed by nephrology.  These were diagnosed in the past 5 years.    She was on an ACE inhibitor until 2019, though she discontinued this secondary to a desire for pregnancy.  No new medication was started.    For Type 2 DM, she had been on Metformin.  She did meet with [facility] at approximately 8 weeks.  She was started on Levemir 50 units BID at that time.  This has been increased to Levemir 100 units in the morning, 216 units in the PM.  She also takes Humalog 20 units with breakfast and lunch and 46  units with dinner.  Her blood sugars have now been well controlled.  She continues to follow with [facility].    She does have a history of abnormal pap smears with [facility] 1, though pap in 2019 was negative.  Repeat pap in 2020 was negative with HR HPV also negative.    She does have a history of seizure disorder and her last seizure was in     She does have Vitamin D deficiency, on Vitamin D    She does have a history of laparoscopic cholecystectomy    Past Medical History:  Past Medical History:   Diagnosis Date   • CKD (chronic kidney disease) stage 2, GFR 60-89 ml/min    • High cholesterol    • HTN (hypertension) 2014   • LGSIL on Pap smear of cervix     ASCUS neg HPV 16 with HPV changes on colpo (no biopsies)   • MRSA infection    • Nephrolithiasis    • Ovarian cyst    • RAD (reactive airway disease)    • Seizure disorder (CMS/HCC)     last attack in 5 years        Past Surgical History:  Past Surgical History:   Procedure Laterality Date   •  section, low transverse     • Cystoscopy,ureteroscopy,lithotripsy  2017    nephrolithiasis   • Removal gallbladder         Medications:  Outpatient Medications Marked as Taking for the 21 encounter (OB Check) with Soha Asher MD   Medication Sig Dispense Refill   • OneTouch Ultra test strip USE TO TEST BLOOD SUGARS FOUR TIMES DAILY 350 strip 0   • HumaLOG KwikPen 100 UNIT/ML pen-injector USE UP  UNITS PER DAY 90 mL 1   • famotidine (PEPCID) 40 MG tablet Take 1 tablet by mouth 2 times daily. 180 tablet 0   • Blood Glucose Monitoring Suppl (ONE TOUCH ULTRA 2) w/Device Kit U UTD     • Diclegis 10-10 MG Tablet Enteric Coated TK 2 TS PO QD HS     • Lancets (onetouch ultrasoft) Misc U UTD BID     • Insulin Pen Needle (BD Pen Needle Shyla U/F) 32G X 4 MM Misc Use to administer insulin 5 times per day 200 each 4   • insulin detemir (LEVEMIR FLEXTOUCH) 100 UNIT/ML pen-injector Take up to 100 units per day as  prescribed 30 mL 4   • budesonide (PULMICORT FLEXHALER) 90 MCG/ACT inhaler Inhale 1 puff into the lungs 2 times daily. 1 Inhaler 3   • albuterol 108 (90 Base) MCG/ACT inhaler Inhale 2 puffs into the lungs every 4 hours as needed for Shortness of Breath or Wheezing. 1 Inhaler 3   • Vitamin D, Ergocalciferol, 1.25 mg (50,000 units) capsule Take 1 capsule by mouth 1 day a week. 4 capsule 11       Allergies:  ALLERGIES:   Allergen Reactions   • Sulfamethoxazole-Trimethoprim HIVES   • Amoxicillin-Pot Clavulanate RASH   • Augmentin [Amoclan] RASH   • Bactrim Ds RASH       Social History:   reports that she has never smoked. She has never used smokeless tobacco. She reports previous alcohol use. She reports that she does not use drugs.      Family History:  family history includes Cancer, Breast in her mother; Cancer, Colon in her father; Diabetes in her father and sister.    Physical Exam:  Visit Vitals  /70   Pulse 80   Ht 5' 7\" (1.702 m)   Wt 100.5 kg   LMP 2020 (Exact Date)   BMI 34.71 kg/m²     General: Patient alert and oriented, no apparent distress  Abdomen: soft, gravid, non-tender, non-distended  +FHTs  Ext: non-tender, no edema      Assessment and Plan:  The patient is a 33 year-old  at 32-6/7 weeks gestation who presents for routine OB visit    1. Dates: EDC: 2021 by LMP consistent with 8 week scan    2. Prenatal Labs: O+, antibody screen negative, rubella immune, varicella immune, RPR NR, HIV negative, Hepatitis B negative, Hepatitis C negative, Hb: 13.5, platelets 222,000    Pap 2020 was negative with HR HPV negative, GC/Chlamydia/trich negative    Repeat antibody screen negative, repeat RPR/HIV negative, repeat Hb 13.1, HbA1C on  was 5.6    3. Genetic screening: I did have a long discussion with the patient regarding genetic screening and diagnostic options.  She desires genetic screening.  Hb electrophoresis normal.  She met with the genetic counselor and had  normal fetal cell free DNA testing.  This is a male infant.  She declined expanded carrier screening.    AFP normal    4. Type II DM: We had a long discussion regarding risks associated with diabetes in pregnancy, both maternal and fetal risks.   We discussed increased risk of hypertensive disorders in pregnancy along with increased risk for  section.  We also discussed increased fetal risks, including macrosomia, shoulder dystocia, along with sequelae of shoulder dystocia, including cerebral palsy, permanent nerve injury, and fetal death.  We discussed risk of  hypoglycemia and hyperbilirubinemia.  We discussed increased risk of fetal malformation in the setting of pre-gestational diabetes.  We discussed increased risk of miscarriage.  We discussed increased risk of stillbirth later on in pregnancy in the setting of poorly controlled diabetes.    We discussed management of diabetes in pregnancy.  We discussed home glucose monitoring 4 times daily with goal parameters.  We discussed improved pregnancy outcomes in the setting of optimal glycemic control.    She has met with [facility] and was started on Levemir twice daily along with Humalog.   She is in contact with [facility] regarding her blood sugars.    Of note, Glycohemoglobin was 7.2 in October, and was 5.6 on     Her insulin has been increased.  She is now taking Levemir 100 units in the morning, 216 units in the PM.  She also takes Humalog 20 units with breakfast and lunch and 46 units with dinner.    In addition, she was seen by the M specialist and recommendation was made for echocardiogram and eye exam.  Referral was placed to cardiology along with ophthalmology.      Her appointment with ophthalmology is in March.    She did meet with cardiology who reported normal electrocardiogram and no further intervention was recommended.      Given recent increase in edema, however, I did recommend echocardiogram and she is scheduled for May 5th.       5. Chronic hypertension: The patient was extensively counseled regarding the increased risk for IUGR (intrauterine growth restriction) (8-16%), superimposed preeclampsia (10-25%), placental abruption, prematurity,  morbidity and  mortality in the setting of chronic hypertension.  The importance of maintaining optimal blood pressure control was discussed.  She is not currently on medication for hypertension and her blood pressure is well controlled.  Serial growth ultrasound as well as  fetal surveillance is indicated in the third trimester.  Moreover, a baseline assessment of urine protein and a baseline CMP should be performed in order to identify any underlying hypertensive nephropathy or hepatic dysfunction.  Creatinine on  was 0.75, AST/ALT was 36/57, uric acid: 6.1.  Urine protein/creatinine ratio in November was 68 mg     The patient was also counseled regarding the benefit of low dose aspirin therapy in reducing the composite risk for adverse pregnancy in the setting of chronic hypertension.  A meta-analysis of trials that enrolled a large number of pregnant women found benefit for aspirin to reduce the frequency of the diagnosis of preeclampsia,  delivery, and growth-restricted infants.  There was a modest reduction of 17% in the incidence of preeclampsia and there was a 14% reduction in the rate of fetal and  deaths with the greatest effect when treatment is initiated prior to 16 weeks gestation.  We discussed that based upon current recommendations from the [place] Preventive Task Force that low-dose aspirin is recommended at a dose of 162 mg daily beginning at 12 weeks.  She is aware of this and is taking this.    She did have mildly elevated BP at her visit on  though repeat was normal.  PIH labs were normal at that time including protein/creatinine ratio of 143 mg.    6. Stage 2 CKD: She did meet with [facility] for consultation and has follow up  scheduled with her nephrology.  She was counseled regarding increased risk of pre-eclampsia, anemia, IUGR, prematurity and  in the setting of CKD.    She continues to follow up with nephrology.    Recommendation was made for CBC, CMP and protein/creatinine ratio every 4-6 weeks.  This were done on  and were normal, and protein/creatinine ratio was 143 mg.      In addition, delivery at 37 weeks gestation or earlier as clinically indicated was recommended.    7. Fetal well-being: positive fetal heart tones, positive fetal movement, normal fetal anatomic survey.  Follow up views are recommended.  We will plan serial growth ultrasound every 4 weeks starting at 24 weeks and continuing until delivery.  We will plan twice weekly fetal surveillance beginning at 32 weeks.      Growth ultrasound , gave EFW: 1679 gm. 3 lb 11 oz 44 %Tile, AC: 21%, cephalic    8. History of prior : Patient was counseled regarding vaginal trial of labor versus repeat .  She is desiring repeat .  She also desires sterilization.  We reviewed that sterilization is permanent and irreversible.  We discussed sterilization by bilateral salpingectomy.  She did sign state sterilization consent form on 2021.  We scheduled her  for May 18th at 7:30 AM    Follow up in 1 week, precautions reviewed.  She did receive tdap at a prior visit.  She is planning to get the COVID vaccine after pregnancy.  She declines vaccine in pregnancy.      Soha Asher MD

## 2021-05-17 ENCOUNTER — HOSPITAL ENCOUNTER (INPATIENT)
Age: 66
LOS: 2 days | Discharge: HOME | DRG: 871 | End: 2021-05-19
Attending: EMERGENCY MEDICINE | Admitting: FAMILY MEDICINE
Payer: MEDICARE

## 2021-05-17 DIAGNOSIS — I10 ESSENTIAL HYPERTENSION: ICD-10-CM

## 2021-05-17 DIAGNOSIS — E11.00 HYPEROSMOLAR HYPERGLYCEMIC STATE (HHS) (HCC): Primary | ICD-10-CM

## 2021-05-17 DIAGNOSIS — N18.32 STAGE 3B CHRONIC KIDNEY DISEASE (HCC): ICD-10-CM

## 2021-05-17 DIAGNOSIS — L40.9 PSORIASIS: ICD-10-CM

## 2021-05-17 DIAGNOSIS — E11.10 LACTIC ACIDOSIS DUE TO DIABETES MELLITUS (HCC): ICD-10-CM

## 2021-05-17 DIAGNOSIS — N39.0 URINARY TRACT INFECTION WITHOUT HEMATURIA, SITE UNSPECIFIED: ICD-10-CM

## 2021-05-17 DIAGNOSIS — F32.A DEPRESSION, UNSPECIFIED DEPRESSION TYPE: ICD-10-CM

## 2021-05-17 DIAGNOSIS — N39.0 UTI (LOWER URINARY TRACT INFECTION): ICD-10-CM

## 2021-05-17 DIAGNOSIS — E11.9 TYPE 2 DIABETES MELLITUS WITHOUT COMPLICATION, WITHOUT LONG-TERM CURRENT USE OF INSULIN (HCC): ICD-10-CM

## 2021-05-17 DIAGNOSIS — E11.00 TYPE 2 DIABETES MELLITUS WITH HYPEROSMOLAR HYPERGLYCEMIC STATE (HHS) (HCC): ICD-10-CM

## 2021-05-17 DIAGNOSIS — E78.00 HYPERCHOLESTEROLEMIA: ICD-10-CM

## 2021-05-17 DIAGNOSIS — Z87.39 PERSONAL HISTORY OF GOUT: ICD-10-CM

## 2021-05-17 LAB
ADMINISTERED INITIALS, ADMINIT: NORMAL
ADMINISTERED INITIALS, ADMINIT: NORMAL
ALBUMIN SERPL-MCNC: 3.9 G/DL (ref 3.5–5)
ALBUMIN/GLOB SERPL: 1.2 {RATIO} (ref 1.1–2.2)
ALP SERPL-CCNC: 138 U/L (ref 45–117)
ALT SERPL-CCNC: 38 U/L (ref 12–78)
ANION GAP SERPL CALC-SCNC: 9 MMOL/L (ref 5–15)
APPEARANCE UR: ABNORMAL
AST SERPL-CCNC: 33 U/L (ref 15–37)
B-OH-BUTYR SERPL-SCNC: 0.19 MMOL/L
BACTERIA URNS QL MICRO: NEGATIVE /HPF
BASE EXCESS BLDV CALC-SCNC: 3.4 MMOL/L
BASOPHILS # BLD: 0.1 K/UL (ref 0–0.1)
BASOPHILS NFR BLD: 1 % (ref 0–1)
BDY SITE: ABNORMAL
BILIRUB SERPL-MCNC: 0.4 MG/DL (ref 0.2–1)
BILIRUB UR QL: NEGATIVE
BUN SERPL-MCNC: 30 MG/DL (ref 6–20)
BUN/CREAT SERPL: 14 (ref 12–20)
CALCIUM SERPL-MCNC: 9.6 MG/DL (ref 8.5–10.1)
CHLORIDE SERPL-SCNC: 92 MMOL/L (ref 97–108)
CO2 SERPL-SCNC: 27 MMOL/L (ref 21–32)
COLOR UR: ABNORMAL
CREAT SERPL-MCNC: 2.17 MG/DL (ref 0.55–1.02)
D50 ADMINISTERED, D50ADM: 0 ML
D50 ADMINISTERED, D50ADM: 0 ML
D50 ORDER, D50ORD: 0 ML
D50 ORDER, D50ORD: 0 ML
DIFFERENTIAL METHOD BLD: NORMAL
EOSINOPHIL # BLD: 0.2 K/UL (ref 0–0.4)
EOSINOPHIL NFR BLD: 2 % (ref 0–7)
EPITH CASTS URNS QL MICRO: ABNORMAL /LPF
ERYTHROCYTE [DISTWIDTH] IN BLOOD BY AUTOMATED COUNT: 14.3 % (ref 11.5–14.5)
GLOBULIN SER CALC-MCNC: 3.2 G/DL (ref 2–4)
GLSCOM COMMENTS: NORMAL
GLSCOM COMMENTS: NORMAL
GLUCOSE BLD STRIP.AUTO-MCNC: 553 MG/DL (ref 65–117)
GLUCOSE BLD STRIP.AUTO-MCNC: >600 MG/DL (ref 65–117)
GLUCOSE BLD STRIP.AUTO-MCNC: >600 MG/DL (ref 65–117)
GLUCOSE SERPL-MCNC: 823 MG/DL (ref 65–100)
GLUCOSE UR STRIP.AUTO-MCNC: >1000 MG/DL
GLUCOSE, GLC: 553 MG/DL
GLUCOSE, GLC: 601 MG/DL
HCO3 BLDV-SCNC: 26 MMOL/L (ref 23–28)
HCT VFR BLD AUTO: 38.6 % (ref 35–47)
HGB BLD-MCNC: 12.1 G/DL (ref 11.5–16)
HGB UR QL STRIP: NEGATIVE
HIGH TARGET, HITG: 250 MG/DL
HIGH TARGET, HITG: 250 MG/DL
IMM GRANULOCYTES # BLD AUTO: 0 K/UL (ref 0–0.04)
IMM GRANULOCYTES NFR BLD AUTO: 0 % (ref 0–0.5)
INSULIN ADMINSTERED, INSADM: 10.8 UNITS/HOUR
INSULIN ADMINSTERED, INSADM: 14.8 UNITS/HOUR
INSULIN ORDER, INSORD: 10.8 UNITS/HOUR
INSULIN ORDER, INSORD: 14.8 UNITS/HOUR
KETONES UR QL STRIP.AUTO: NEGATIVE MG/DL
LACTATE SERPL-SCNC: 2.5 MMOL/L (ref 0.4–2)
LEUKOCYTE ESTERASE UR QL STRIP.AUTO: ABNORMAL
LOW TARGET, LOT: 150 MG/DL
LOW TARGET, LOT: 150 MG/DL
LYMPHOCYTES # BLD: 2.5 K/UL (ref 0.8–3.5)
LYMPHOCYTES NFR BLD: 33 % (ref 12–49)
MAGNESIUM SERPL-MCNC: 2 MG/DL (ref 1.6–2.4)
MCH RBC QN AUTO: 29.7 PG (ref 26–34)
MCHC RBC AUTO-ENTMCNC: 31.3 G/DL (ref 30–36.5)
MCV RBC AUTO: 94.6 FL (ref 80–99)
MINUTES UNTIL NEXT BG, NBG: 60 MIN
MINUTES UNTIL NEXT BG, NBG: 60 MIN
MONOCYTES # BLD: 0.6 K/UL (ref 0–1)
MONOCYTES NFR BLD: 8 % (ref 5–13)
MULTIPLIER, MUL: 0.02
MULTIPLIER, MUL: 0.03
NEUTS SEG # BLD: 4.2 K/UL (ref 1.8–8)
NEUTS SEG NFR BLD: 56 % (ref 32–75)
NITRITE UR QL STRIP.AUTO: POSITIVE
NRBC # BLD: 0 K/UL (ref 0–0.01)
NRBC BLD-RTO: 0 PER 100 WBC
ORDER INITIALS, ORDINIT: NORMAL
ORDER INITIALS, ORDINIT: NORMAL
PCO2 BLDV: 33.8 MMHG (ref 41–51)
PH BLDV: 7.51 [PH] (ref 7.32–7.42)
PH UR STRIP: 5.5 [PH] (ref 5–8)
PHOSPHATE SERPL-MCNC: 3.1 MG/DL (ref 2.6–4.7)
PLATELET # BLD AUTO: 202 K/UL (ref 150–400)
PO2 BLDV: 57 MMHG (ref 25–40)
POTASSIUM SERPL-SCNC: 4.7 MMOL/L (ref 3.5–5.1)
PROT SERPL-MCNC: 7.1 G/DL (ref 6.4–8.2)
PROT UR STRIP-MCNC: NEGATIVE MG/DL
RBC # BLD AUTO: 4.08 M/UL (ref 3.8–5.2)
RBC #/AREA URNS HPF: ABNORMAL /HPF (ref 0–5)
SAO2 % BLDV: 92 % (ref 65–88)
SAO2% DEVICE SAO2% SENSOR NAME: ABNORMAL
SERVICE CMNT-IMP: ABNORMAL
SODIUM SERPL-SCNC: 128 MMOL/L (ref 136–145)
SP GR UR REFRACTOMETRY: 1.03 (ref 1–1.03)
SPECIMEN SITE: ABNORMAL
UR CULT HOLD, URHOLD: NORMAL
UROBILINOGEN UR QL STRIP.AUTO: 0.2 EU/DL (ref 0.2–1)
WBC # BLD AUTO: 7.5 K/UL (ref 3.6–11)
WBC URNS QL MICRO: ABNORMAL /HPF (ref 0–4)
YEAST BUDDING URNS QL: PRESENT

## 2021-05-17 PROCEDURE — 80053 COMPREHEN METABOLIC PANEL: CPT

## 2021-05-17 PROCEDURE — 82803 BLOOD GASES ANY COMBINATION: CPT

## 2021-05-17 PROCEDURE — 74011000250 HC RX REV CODE- 250: Performed by: EMERGENCY MEDICINE

## 2021-05-17 PROCEDURE — 99284 EMERGENCY DEPT VISIT MOD MDM: CPT

## 2021-05-17 PROCEDURE — 74011250636 HC RX REV CODE- 250/636: Performed by: EMERGENCY MEDICINE

## 2021-05-17 PROCEDURE — 96374 THER/PROPH/DIAG INJ IV PUSH: CPT

## 2021-05-17 PROCEDURE — 74011000258 HC RX REV CODE- 258: Performed by: EMERGENCY MEDICINE

## 2021-05-17 PROCEDURE — 74011636637 HC RX REV CODE- 636/637: Performed by: EMERGENCY MEDICINE

## 2021-05-17 PROCEDURE — 83735 ASSAY OF MAGNESIUM: CPT

## 2021-05-17 PROCEDURE — 83605 ASSAY OF LACTIC ACID: CPT

## 2021-05-17 PROCEDURE — 87040 BLOOD CULTURE FOR BACTERIA: CPT

## 2021-05-17 PROCEDURE — 65660000000 HC RM CCU STEPDOWN

## 2021-05-17 PROCEDURE — 74011250636 HC RX REV CODE- 250/636: Performed by: NURSE PRACTITIONER

## 2021-05-17 PROCEDURE — 84100 ASSAY OF PHOSPHORUS: CPT

## 2021-05-17 PROCEDURE — 74011250636 HC RX REV CODE- 250/636: Performed by: STUDENT IN AN ORGANIZED HEALTH CARE EDUCATION/TRAINING PROGRAM

## 2021-05-17 PROCEDURE — 87077 CULTURE AEROBIC IDENTIFY: CPT

## 2021-05-17 PROCEDURE — 85025 COMPLETE CBC W/AUTO DIFF WBC: CPT

## 2021-05-17 PROCEDURE — 82010 KETONE BODYS QUAN: CPT

## 2021-05-17 PROCEDURE — 96375 TX/PRO/DX INJ NEW DRUG ADDON: CPT

## 2021-05-17 PROCEDURE — 87086 URINE CULTURE/COLONY COUNT: CPT

## 2021-05-17 PROCEDURE — 74011000250 HC RX REV CODE- 250: Performed by: STUDENT IN AN ORGANIZED HEALTH CARE EDUCATION/TRAINING PROGRAM

## 2021-05-17 PROCEDURE — 36415 COLL VENOUS BLD VENIPUNCTURE: CPT

## 2021-05-17 PROCEDURE — 81001 URINALYSIS AUTO W/SCOPE: CPT

## 2021-05-17 PROCEDURE — 74011250637 HC RX REV CODE- 250/637: Performed by: STUDENT IN AN ORGANIZED HEALTH CARE EDUCATION/TRAINING PROGRAM

## 2021-05-17 PROCEDURE — 82962 GLUCOSE BLOOD TEST: CPT

## 2021-05-17 RX ORDER — ACETAMINOPHEN 325 MG/1
650 TABLET ORAL
Status: DISCONTINUED | OUTPATIENT
Start: 2021-05-17 | End: 2021-05-19 | Stop reason: HOSPADM

## 2021-05-17 RX ORDER — MINERAL OIL
180 ENEMA (ML) RECTAL
COMMUNITY

## 2021-05-17 RX ORDER — INSULIN LISPRO 100 [IU]/ML
INJECTION, SOLUTION INTRAVENOUS; SUBCUTANEOUS
Status: DISCONTINUED | OUTPATIENT
Start: 2021-05-18 | End: 2021-05-17

## 2021-05-17 RX ORDER — HEPARIN SODIUM 5000 [USP'U]/ML
5000 INJECTION, SOLUTION INTRAVENOUS; SUBCUTANEOUS EVERY 8 HOURS
Status: DISCONTINUED | OUTPATIENT
Start: 2021-05-17 | End: 2021-05-19

## 2021-05-17 RX ORDER — SODIUM CHLORIDE 9 MG/ML
125 INJECTION, SOLUTION INTRAVENOUS CONTINUOUS
Status: DISCONTINUED | OUTPATIENT
Start: 2021-05-17 | End: 2021-05-17

## 2021-05-17 RX ORDER — LIDOCAINE 50 MG/G
1 PATCH TOPICAL
COMMUNITY

## 2021-05-17 RX ORDER — ACETAMINOPHEN 650 MG/1
650 SUPPOSITORY RECTAL
Status: DISCONTINUED | OUTPATIENT
Start: 2021-05-17 | End: 2021-05-19 | Stop reason: HOSPADM

## 2021-05-17 RX ORDER — ATORVASTATIN CALCIUM 20 MG/1
20 TABLET, FILM COATED ORAL
Status: DISCONTINUED | OUTPATIENT
Start: 2021-05-17 | End: 2021-05-19 | Stop reason: HOSPADM

## 2021-05-17 RX ORDER — POLYETHYLENE GLYCOL 3350 17 G/17G
17 POWDER, FOR SOLUTION ORAL DAILY PRN
Status: DISCONTINUED | OUTPATIENT
Start: 2021-05-17 | End: 2021-05-19 | Stop reason: HOSPADM

## 2021-05-17 RX ORDER — DEXTROSE 50 % IN WATER (D50W) INTRAVENOUS SYRINGE
25-50 AS NEEDED
Status: DISCONTINUED | OUTPATIENT
Start: 2021-05-17 | End: 2021-05-18

## 2021-05-17 RX ORDER — MAGNESIUM SULFATE 100 %
4 CRYSTALS MISCELLANEOUS AS NEEDED
Status: DISCONTINUED | OUTPATIENT
Start: 2021-05-17 | End: 2021-05-18

## 2021-05-17 RX ORDER — PANTOPRAZOLE SODIUM 40 MG/1
40 TABLET, DELAYED RELEASE ORAL
Status: DISCONTINUED | OUTPATIENT
Start: 2021-05-18 | End: 2021-05-19 | Stop reason: HOSPADM

## 2021-05-17 RX ORDER — LISINOPRIL 20 MG/1
40 TABLET ORAL DAILY
Status: DISCONTINUED | OUTPATIENT
Start: 2021-05-18 | End: 2021-05-19

## 2021-05-17 RX ORDER — PAROXETINE HYDROCHLORIDE 20 MG/1
30 TABLET, FILM COATED ORAL
Status: DISCONTINUED | OUTPATIENT
Start: 2021-05-17 | End: 2021-05-19 | Stop reason: HOSPADM

## 2021-05-17 RX ORDER — SODIUM CHLORIDE 0.9 % (FLUSH) 0.9 %
5-40 SYRINGE (ML) INJECTION AS NEEDED
Status: DISCONTINUED | OUTPATIENT
Start: 2021-05-17 | End: 2021-05-19 | Stop reason: HOSPADM

## 2021-05-17 RX ORDER — LISINOPRIL AND HYDROCHLOROTHIAZIDE 12.5; 2 MG/1; MG/1
2 TABLET ORAL DAILY
COMMUNITY
End: 2021-05-19

## 2021-05-17 RX ORDER — SODIUM CHLORIDE 0.9 % (FLUSH) 0.9 %
5-40 SYRINGE (ML) INJECTION EVERY 8 HOURS
Status: DISCONTINUED | OUTPATIENT
Start: 2021-05-17 | End: 2021-05-19 | Stop reason: HOSPADM

## 2021-05-17 RX ORDER — HYDROCHLOROTHIAZIDE 25 MG/1
25 TABLET ORAL DAILY
Status: DISCONTINUED | OUTPATIENT
Start: 2021-05-18 | End: 2021-05-19 | Stop reason: HOSPADM

## 2021-05-17 RX ORDER — SODIUM CHLORIDE 450 MG/100ML
200 INJECTION, SOLUTION INTRAVENOUS CONTINUOUS
Status: DISCONTINUED | OUTPATIENT
Start: 2021-05-17 | End: 2021-05-18

## 2021-05-17 RX ADMIN — SODIUM CHLORIDE 200 ML/HR: 4.5 INJECTION, SOLUTION INTRAVENOUS at 22:53

## 2021-05-17 RX ADMIN — Medication 10 ML: at 22:54

## 2021-05-17 RX ADMIN — PAROXETINE HYDROCHLORIDE 30 MG: 20 TABLET, FILM COATED ORAL at 22:52

## 2021-05-17 RX ADMIN — WATER 2 G: 1 INJECTION INTRAMUSCULAR; INTRAVENOUS; SUBCUTANEOUS at 21:19

## 2021-05-17 RX ADMIN — HEPARIN SODIUM 5000 UNITS: 5000 INJECTION INTRAVENOUS; SUBCUTANEOUS at 22:54

## 2021-05-17 RX ADMIN — SODIUM CHLORIDE 10.8 UNITS/HR: 9 INJECTION, SOLUTION INTRAVENOUS at 21:39

## 2021-05-17 RX ADMIN — ATORVASTATIN CALCIUM 20 MG: 20 TABLET, FILM COATED ORAL at 22:53

## 2021-05-17 RX ADMIN — SODIUM CHLORIDE 1000 ML: 9 INJECTION, SOLUTION INTRAVENOUS at 20:20

## 2021-05-17 RX ADMIN — SODIUM CHLORIDE 1000 ML: 9 INJECTION, SOLUTION INTRAVENOUS at 18:46

## 2021-05-17 NOTE — ED PROVIDER NOTES
73-year-old female history of diabetes and hypertension presents with a chief complaint of hyperglycemia. Patient states that over the last week her blood glucose levels at home have been greater than 600. She has had polydipsia and polyuria. She was recently treated for urinary tract infection but did not complete the course of antibiotics because it made her vomit. She endorses urgency but denies dysuria. She has not had a fever, chest pain, shortness of breath, abdominal pain or GI symptoms. She currently takes oral antihyperglycemics but is not on insulin.            Past Medical History:   Diagnosis Date    Arthritis     Diabetes ([address]     Gout     Hernia, hiatal     Hypertension        Past Surgical History:   Procedure Laterality Date    HX KNEE REPLACEMENT Right          Family History:   Problem Relation Age of Onset    Cancer Mother     Heart Disease Father     Diabetes Maternal Grandmother        Social History     Socioeconomic History    Marital status:      Spouse name: Not on file    Number of children: Not on file    Years of education: Not on file    Highest education level: Not on file   Occupational History    Not on file   Social Needs    Financial resource strain: Not on file    Food insecurity     Worry: Not on file     Inability: Not on file    Transportation needs     Medical: Not on file     Non-medical: Not on file   Tobacco Use    Smoking status: Never Smoker    Smokeless tobacco: Never Used   Substance and Sexual Activity    Alcohol use: No    Drug use: No    Sexual activity: Never   Lifestyle    Physical activity     Days per week: Not on file     Minutes per session: Not on file    Stress: Not on file   Relationships    Social connections     Talks on phone: Not on file     Gets together: Not on file     Attends [employer] service: Not on file     Active member of club or organization: Not on file     Attends meetings of clubs or organizations: Not on file     Relationship status: Not on file    Intimate partner violence     Fear of current or ex partner: Not on file     Emotionally abused: Not on file     Physically abused: Not on file     Forced sexual activity: Not on file   Other Topics Concern    Not on file   Social History Narrative    Not on file         ALLERGIES: Sulfa (sulfonamide antibiotics)    Review of Systems   Constitutional: Negative for fever. HENT: Negative for rhinorrhea. Respiratory: Negative for shortness of breath. Cardiovascular: Negative for chest pain. Gastrointestinal: Negative for abdominal pain. Endocrine: Positive for polydipsia and polyuria. Genitourinary: Positive for frequency and urgency. Negative for dysuria. Musculoskeletal: Negative for back pain. Skin: Negative for wound. Neurological: Negative for headaches. Psychiatric/Behavioral: Negative for confusion. Vitals:    05/17/21 1818   BP: 98/66   Pulse: (!) 108   Resp: 18   Temp: 96.9 °F (36.1 °C)   SpO2: 97%   Weight: 90.1 kg (198 lb 10.2 oz)   Height: 5' 2\" (1.575 m)            Physical Exam  Vitals signs and nursing note reviewed. Constitutional:       General: She is not in acute distress. Appearance: Normal appearance. She is not ill-appearing, toxic-appearing or diaphoretic. HENT:      Head: Normocephalic and atraumatic. Eyes:      Extraocular Movements: Extraocular movements intact. Neck:      Musculoskeletal: Normal range of motion. Cardiovascular:      Rate and Rhythm: Normal rate. Pulses: Normal pulses. Heart sounds: No murmur. No friction rub. No gallop. Pulmonary:      Effort: Pulmonary effort is normal. No respiratory distress. Breath sounds: No wheezing or rales. Abdominal:      General: Abdomen is flat. Bowel sounds are normal. There is no distension. Palpations: Abdomen is soft. Tenderness: There is no abdominal tenderness. There is no guarding. Musculoskeletal: Normal range of motion. Skin:     General: Skin is warm and dry. Neurological:      Mental Status: She is alert and oriented to person, place, and time. Psychiatric:         Mood and Affect: Mood normal.          MDM  Number of Diagnoses or Management Options  Hyperosmolar hyperglycemic state (HHS) ([facility] [address]  Urinary tract infection without hematuria, site unspecified  Diagnosis management comments: 29-year-old female presents with hyperglycemia. Obviously there is concern for DKA and HHS. Patient has been diagnosed with UTI but has not been taking the antibiotics. Urinalysis shows significant pyuria. Compressive metabolic panel shows glucose greater than 800 concerning for HHS. Patient was given IV fluids and started on insulin drip. She was also given IV Rocephin and will be admitted to the hospital for further management. She is comfortable and agreeable to plan of care.     Perfect Serve Consult for Admission  8:15 PM    ED Room Number: ER09/09  Patient Name and age:  Gregoria Man 60 y.o.  female  Working Diagnosis: Hyperosmolar hyperglycemic state (HHS) ([facility] [address]  (primary encounter diagnosis)  Urinary tract infection without hematuria, site unspecified    COVID-19 Suspicion:  no  Sepsis present:  no  Reassessment needed: no  Code Status:  Full Code  Readmission: no  Isolation Requirements:  no  Recommended Level of Care:  telemetry  Department:[facility] ED - [phone]    Total critical care time spent exclusive of procedures: 35 minutes         Amount and/or Complexity of Data Reviewed  Clinical lab tests: ordered and reviewed           Procedures

## 2021-05-17 NOTE — Clinical Note
Status[de-identified] INPATIENT [101]   Type of Bed: Telemetry [19]   Cardiac Monitoring Required?: Yes   Inpatient Hospitalization Certified Necessary for the Following Reasons: 3.  Patient receiving treatment that can only be provided in an inpatient setting (further clarification in H&P documentation)   Admitting Diagnosis: Type 2 diabetes mellitus with hyperosmolar hyperglycemic state (HHS) [facility]) [9412142]   Admitting Diagnosis: UTI (urinary tract infection) [245080]   Admitting Physician: Nura Burnette [[id number]]   Attending Physician: Nura Burnette [[id number]]   Estimated Length of Stay: 2 Midnights   Discharge Plan[de-identified] Home with Office Follow-up

## 2021-05-17 NOTE — ED TRIAGE NOTES
Pt reports she has hx of DM and takes oral medication only and no insulin. Pt reports for the past 4-5 days she has had elevated blood sugars at home, says her glucometer has been reading HI. Says she is starting to have vision changes associated with her high blood sugars. Called PCP and referred to the ED for evaluation.

## 2021-05-18 LAB
ADMINISTERED INITIALS, ADMINIT: NORMAL
ALBUMIN SERPL-MCNC: 2.8 G/DL (ref 3.5–5)
ALBUMIN/GLOB SERPL: 0.9 {RATIO} (ref 1.1–2.2)
ALP SERPL-CCNC: 97 U/L (ref 45–117)
ALT SERPL-CCNC: 26 U/L (ref 12–78)
ANION GAP SERPL CALC-SCNC: 3 MMOL/L (ref 5–15)
ANION GAP SERPL CALC-SCNC: 4 MMOL/L (ref 5–15)
AST SERPL-CCNC: 16 U/L (ref 15–37)
BASOPHILS # BLD: 0 K/UL (ref 0–0.1)
BASOPHILS NFR BLD: 1 % (ref 0–1)
BILIRUB SERPL-MCNC: 0.2 MG/DL (ref 0.2–1)
BUN SERPL-MCNC: 25 MG/DL (ref 6–20)
BUN SERPL-MCNC: 27 MG/DL (ref 6–20)
BUN/CREAT SERPL: 16 (ref 12–20)
BUN/CREAT SERPL: 16 (ref 12–20)
CALCIUM SERPL-MCNC: 8.5 MG/DL (ref 8.5–10.1)
CALCIUM SERPL-MCNC: 9.7 MG/DL (ref 8.5–10.1)
CHLORIDE SERPL-SCNC: 105 MMOL/L (ref 97–108)
CHLORIDE SERPL-SCNC: 106 MMOL/L (ref 97–108)
CO2 SERPL-SCNC: 28 MMOL/L (ref 21–32)
CO2 SERPL-SCNC: 30 MMOL/L (ref 21–32)
CREAT SERPL-MCNC: 1.54 MG/DL (ref 0.55–1.02)
CREAT SERPL-MCNC: 1.67 MG/DL (ref 0.55–1.02)
D50 ADMINISTERED, D50ADM: 0 ML
D50 ORDER, D50ORD: 0 ML
DIFFERENTIAL METHOD BLD: ABNORMAL
EOSINOPHIL # BLD: 0.1 K/UL (ref 0–0.4)
EOSINOPHIL NFR BLD: 1 % (ref 0–7)
ERYTHROCYTE [DISTWIDTH] IN BLOOD BY AUTOMATED COUNT: 14.1 % (ref 11.5–14.5)
EST. AVERAGE GLUCOSE BLD GHB EST-MCNC: ABNORMAL MG/DL
GLOBULIN SER CALC-MCNC: 3 G/DL (ref 2–4)
GLSCOM COMMENTS: NORMAL
GLUCOSE BLD STRIP.AUTO-MCNC: 136 MG/DL (ref 65–117)
GLUCOSE BLD STRIP.AUTO-MCNC: 183 MG/DL (ref 65–117)
GLUCOSE BLD STRIP.AUTO-MCNC: 194 MG/DL (ref 65–117)
GLUCOSE BLD STRIP.AUTO-MCNC: 271 MG/DL (ref 65–117)
GLUCOSE BLD STRIP.AUTO-MCNC: 285 MG/DL (ref 65–117)
GLUCOSE BLD STRIP.AUTO-MCNC: 285 MG/DL (ref 65–117)
GLUCOSE BLD STRIP.AUTO-MCNC: 289 MG/DL (ref 65–117)
GLUCOSE BLD STRIP.AUTO-MCNC: 338 MG/DL (ref 65–117)
GLUCOSE BLD STRIP.AUTO-MCNC: 338 MG/DL (ref 65–117)
GLUCOSE BLD STRIP.AUTO-MCNC: 91 MG/DL (ref 65–117)
GLUCOSE SERPL-MCNC: 250 MG/DL (ref 65–100)
GLUCOSE SERPL-MCNC: 90 MG/DL (ref 65–100)
GLUCOSE, GLC: 136 MG/DL
GLUCOSE, GLC: 183 MG/DL
GLUCOSE, GLC: 194 MG/DL
GLUCOSE, GLC: 271 MG/DL
GLUCOSE, GLC: 285 MG/DL
GLUCOSE, GLC: 289 MG/DL
GLUCOSE, GLC: 91 MG/DL
HBA1C MFR BLD: >14 % (ref 4–5.6)
HCT VFR BLD AUTO: 31.3 % (ref 35–47)
HGB BLD-MCNC: 9.6 G/DL (ref 11.5–16)
HIGH TARGET, HITG: 250 MG/DL
IMM GRANULOCYTES # BLD AUTO: 0 K/UL (ref 0–0.04)
IMM GRANULOCYTES NFR BLD AUTO: 1 % (ref 0–0.5)
INSULIN ADMINSTERED, INSADM: 0.6 UNITS/HOUR
INSULIN ADMINSTERED, INSADM: 0.8 UNITS/HOUR
INSULIN ADMINSTERED, INSADM: 1.3 UNITS/HOUR
INSULIN ADMINSTERED, INSADM: 3.7 UNITS/HOUR
INSULIN ADMINSTERED, INSADM: 4.5 UNITS/HOUR
INSULIN ADMINSTERED, INSADM: 6.3 UNITS/HOUR
INSULIN ADMINSTERED, INSADM: 6.9 UNITS/HOUR
INSULIN ORDER, INSORD: 0.6 UNITS/HOUR
INSULIN ORDER, INSORD: 0.8 UNITS/HOUR
INSULIN ORDER, INSORD: 1.3 UNITS/HOUR
INSULIN ORDER, INSORD: 3.7 UNITS/HOUR
INSULIN ORDER, INSORD: 4.5 UNITS/HOUR
INSULIN ORDER, INSORD: 6.3 UNITS/HOUR
INSULIN ORDER, INSORD: 6.9 UNITS/HOUR
LACTATE SERPL-SCNC: 1.7 MMOL/L (ref 0.4–2)
LOW TARGET, LOT: 150 MG/DL
LYMPHOCYTES # BLD: 1.5 K/UL (ref 0.8–3.5)
LYMPHOCYTES NFR BLD: 20 % (ref 12–49)
MAGNESIUM SERPL-MCNC: 1.8 MG/DL (ref 1.6–2.4)
MCH RBC QN AUTO: 29.5 PG (ref 26–34)
MCHC RBC AUTO-ENTMCNC: 30.7 G/DL (ref 30–36.5)
MCV RBC AUTO: 96.3 FL (ref 80–99)
MINUTES UNTIL NEXT BG, NBG: 60 MIN
MONOCYTES # BLD: 0.6 K/UL (ref 0–1)
MONOCYTES NFR BLD: 8 % (ref 5–13)
MULTIPLIER, MUL: 0.01
MULTIPLIER, MUL: 0.01
MULTIPLIER, MUL: 0.02
MULTIPLIER, MUL: 0.02
MULTIPLIER, MUL: 0.03
NEUTS SEG # BLD: 5.4 K/UL (ref 1.8–8)
NEUTS SEG NFR BLD: 70 % (ref 32–75)
NRBC # BLD: 0 K/UL (ref 0–0.01)
NRBC BLD-RTO: 0 PER 100 WBC
ORDER INITIALS, ORDINIT: NORMAL
OSMOLALITY SERPL: 299 MOSM/KG H2O
PLATELET # BLD AUTO: 141 K/UL (ref 150–400)
PMV BLD AUTO: 12.6 FL (ref 8.9–12.9)
POTASSIUM SERPL-SCNC: 3.5 MMOL/L (ref 3.5–5.1)
POTASSIUM SERPL-SCNC: 3.9 MMOL/L (ref 3.5–5.1)
PROT SERPL-MCNC: 5.8 G/DL (ref 6.4–8.2)
RBC # BLD AUTO: 3.25 M/UL (ref 3.8–5.2)
SERVICE CMNT-IMP: ABNORMAL
SERVICE CMNT-IMP: NORMAL
SODIUM SERPL-SCNC: 137 MMOL/L (ref 136–145)
SODIUM SERPL-SCNC: 139 MMOL/L (ref 136–145)
WBC # BLD AUTO: 7.7 K/UL (ref 3.6–11)

## 2021-05-18 PROCEDURE — 85025 COMPLETE CBC W/AUTO DIFF WBC: CPT

## 2021-05-18 PROCEDURE — 74011250636 HC RX REV CODE- 250/636: Performed by: STUDENT IN AN ORGANIZED HEALTH CARE EDUCATION/TRAINING PROGRAM

## 2021-05-18 PROCEDURE — 36415 COLL VENOUS BLD VENIPUNCTURE: CPT

## 2021-05-18 PROCEDURE — 83735 ASSAY OF MAGNESIUM: CPT

## 2021-05-18 PROCEDURE — 83036 HEMOGLOBIN GLYCOSYLATED A1C: CPT

## 2021-05-18 PROCEDURE — 93042 RHYTHM ECG REPORT: CPT | Performed by: FAMILY MEDICINE

## 2021-05-18 PROCEDURE — 83930 ASSAY OF BLOOD OSMOLALITY: CPT

## 2021-05-18 PROCEDURE — 74011000250 HC RX REV CODE- 250: Performed by: STUDENT IN AN ORGANIZED HEALTH CARE EDUCATION/TRAINING PROGRAM

## 2021-05-18 PROCEDURE — 83605 ASSAY OF LACTIC ACID: CPT

## 2021-05-18 PROCEDURE — 74011250637 HC RX REV CODE- 250/637: Performed by: STUDENT IN AN ORGANIZED HEALTH CARE EDUCATION/TRAINING PROGRAM

## 2021-05-18 PROCEDURE — 97165 OT EVAL LOW COMPLEX 30 MIN: CPT | Performed by: OCCUPATIONAL THERAPIST

## 2021-05-18 PROCEDURE — 97535 SELF CARE MNGMENT TRAINING: CPT | Performed by: OCCUPATIONAL THERAPIST

## 2021-05-18 PROCEDURE — 82962 GLUCOSE BLOOD TEST: CPT

## 2021-05-18 PROCEDURE — 65270000029 HC RM PRIVATE

## 2021-05-18 PROCEDURE — 97162 PT EVAL MOD COMPLEX 30 MIN: CPT

## 2021-05-18 PROCEDURE — 80053 COMPREHEN METABOLIC PANEL: CPT

## 2021-05-18 PROCEDURE — 74011636637 HC RX REV CODE- 636/637: Performed by: EMERGENCY MEDICINE

## 2021-05-18 PROCEDURE — 74011000258 HC RX REV CODE- 258: Performed by: EMERGENCY MEDICINE

## 2021-05-18 PROCEDURE — 74011636637 HC RX REV CODE- 636/637: Performed by: STUDENT IN AN ORGANIZED HEALTH CARE EDUCATION/TRAINING PROGRAM

## 2021-05-18 PROCEDURE — 99223 1ST HOSP IP/OBS HIGH 75: CPT | Performed by: FAMILY MEDICINE

## 2021-05-18 PROCEDURE — 97530 THERAPEUTIC ACTIVITIES: CPT

## 2021-05-18 RX ORDER — DEXTROSE, SODIUM CHLORIDE, AND POTASSIUM CHLORIDE 5; .45; .15 G/100ML; G/100ML; G/100ML
200 INJECTION INTRAVENOUS CONTINUOUS
Status: DISCONTINUED | OUTPATIENT
Start: 2021-05-18 | End: 2021-05-18

## 2021-05-18 RX ORDER — INSULIN GLARGINE 100 [IU]/ML
0.4 INJECTION, SOLUTION SUBCUTANEOUS DAILY
Status: DISCONTINUED | OUTPATIENT
Start: 2021-05-18 | End: 2021-05-19

## 2021-05-18 RX ORDER — INSULIN LISPRO 100 [IU]/ML
INJECTION, SOLUTION INTRAVENOUS; SUBCUTANEOUS
Status: DISCONTINUED | OUTPATIENT
Start: 2021-05-18 | End: 2021-05-19 | Stop reason: HOSPADM

## 2021-05-18 RX ORDER — DEXTROSE MONOHYDRATE AND SODIUM CHLORIDE 5; .45 G/100ML; G/100ML
200 INJECTION, SOLUTION INTRAVENOUS CONTINUOUS
Status: DISCONTINUED | OUTPATIENT
Start: 2021-05-18 | End: 2021-05-18

## 2021-05-18 RX ORDER — SODIUM CHLORIDE 9 MG/ML
50 INJECTION, SOLUTION INTRAVENOUS CONTINUOUS
Status: DISCONTINUED | OUTPATIENT
Start: 2021-05-18 | End: 2021-05-18

## 2021-05-18 RX ORDER — CEPHALEXIN 250 MG/1
500 CAPSULE ORAL 2 TIMES DAILY
Status: DISCONTINUED | OUTPATIENT
Start: 2021-05-18 | End: 2021-05-19 | Stop reason: HOSPADM

## 2021-05-18 RX ORDER — DEXTROSE 50 % IN WATER (D50W) INTRAVENOUS SYRINGE
25-50 AS NEEDED
Status: DISCONTINUED | OUTPATIENT
Start: 2021-05-18 | End: 2021-05-19 | Stop reason: HOSPADM

## 2021-05-18 RX ADMIN — ATORVASTATIN CALCIUM 20 MG: 20 TABLET, FILM COATED ORAL at 22:58

## 2021-05-18 RX ADMIN — HEPARIN SODIUM 5000 UNITS: 5000 INJECTION INTRAVENOUS; SUBCUTANEOUS at 15:30

## 2021-05-18 RX ADMIN — INSULIN LISPRO 7 UNITS: 100 INJECTION, SOLUTION INTRAVENOUS; SUBCUTANEOUS at 16:53

## 2021-05-18 RX ADMIN — Medication 10 ML: at 15:31

## 2021-05-18 RX ADMIN — HEPARIN SODIUM 5000 UNITS: 5000 INJECTION INTRAVENOUS; SUBCUTANEOUS at 06:16

## 2021-05-18 RX ADMIN — INSULIN LISPRO 4 UNITS: 100 INJECTION, SOLUTION INTRAVENOUS; SUBCUTANEOUS at 22:59

## 2021-05-18 RX ADMIN — Medication 10 ML: at 08:06

## 2021-05-18 RX ADMIN — SODIUM CHLORIDE 50 ML/HR: 9 INJECTION, SOLUTION INTRAVENOUS at 16:23

## 2021-05-18 RX ADMIN — SODIUM CHLORIDE 3.7 UNITS/HR: 9 INJECTION, SOLUTION INTRAVENOUS at 01:26

## 2021-05-18 RX ADMIN — VANCOMYCIN HYDROCHLORIDE 1250 MG: 1.25 INJECTION, POWDER, LYOPHILIZED, FOR SOLUTION INTRAVENOUS at 06:22

## 2021-05-18 RX ADMIN — HEPARIN SODIUM 5000 UNITS: 5000 INJECTION INTRAVENOUS; SUBCUTANEOUS at 22:58

## 2021-05-18 RX ADMIN — SODIUM CHLORIDE 50 ML/HR: 9 INJECTION, SOLUTION INTRAVENOUS at 07:59

## 2021-05-18 RX ADMIN — PAROXETINE HYDROCHLORIDE 30 MG: 20 TABLET, FILM COATED ORAL at 22:59

## 2021-05-18 RX ADMIN — Medication 10 ML: at 22:59

## 2021-05-18 RX ADMIN — INSULIN LISPRO 5 UNITS: 100 INJECTION, SOLUTION INTRAVENOUS; SUBCUTANEOUS at 12:06

## 2021-05-18 RX ADMIN — INSULIN LISPRO 5 UNITS: 100 INJECTION, SOLUTION INTRAVENOUS; SUBCUTANEOUS at 07:55

## 2021-05-18 RX ADMIN — ACETAMINOPHEN 650 MG: 325 TABLET ORAL at 03:54

## 2021-05-18 RX ADMIN — CEPHALEXIN 500 MG: 250 CAPSULE ORAL at 15:31

## 2021-05-18 RX ADMIN — SODIUM CHLORIDE 1000 ML: 9 INJECTION, SOLUTION INTRAVENOUS at 03:54

## 2021-05-18 RX ADMIN — POTASSIUM CHLORIDE, DEXTROSE MONOHYDRATE AND SODIUM CHLORIDE 200 ML/HR: 150; 5; 450 INJECTION, SOLUTION INTRAVENOUS at 03:54

## 2021-05-18 RX ADMIN — INSULIN GLARGINE 35 UNITS: 100 INJECTION, SOLUTION SUBCUTANEOUS at 06:15

## 2021-05-18 RX ADMIN — CEFEPIME HYDROCHLORIDE 2 G: 2 INJECTION, POWDER, FOR SOLUTION INTRAVENOUS at 05:26

## 2021-05-18 RX ADMIN — PANTOPRAZOLE SODIUM 40 MG: 40 TABLET, DELAYED RELEASE ORAL at 08:04

## 2021-05-18 RX ADMIN — DEXTROSE AND SODIUM CHLORIDE 150 ML/HR: 5; 450 INJECTION, SOLUTION INTRAVENOUS at 02:45

## 2021-05-18 NOTE — PROGRESS NOTES
[facility]   Senior Resident Admission Note    CC: Hyperglycemia    HPI:  Pennie Frey is a 60 y.o. female with PMHx of HTN, and non insulin dependent DM, who presents to the ER complaining of hyperglycemia (home glucose monitor reading \"high\") for the past week with associated polydipsia, polyuria, blurry vision and fatigue. Diagnosed with UTI 1 month ago via virtual visit with PCP, but didn't complete the course of Abx (macrobid then cipro) bc of side effect of vomiting. Continued to have UTI sx's with urgency and frequency stating she had to start wearing depends to keep from voiding on herself. Called her PCP today for the elevated glucose readings and was told to go to the ED. Denies dysuria but endorses urgency and frequency. Denies fever, chills, abdominal pain, n/v/d, chest pain, SOB, cough congestion. received both COVID vaccines and has not been around and sick contacts. In the ED:   Vitals:   Patient Vitals for the past 4 hrs:   Temp Pulse Resp BP SpO2   05/17/21 1930    126/75 97 %   05/17/21 1918     95 %   05/17/21 1915    (!) 108/57 96 %   05/17/21 1900    (!) 106/54 92 %   05/17/21 1818 96.9 °F (36.1 °C) (!) 108 18 98/66 97 %         Labs: UA with +nitrites and small LE, no blood. >1000 glucose and negative for ketones, Glucose 823, Na 128 (corrected 140), K 4.7 but hemolyzed, Cl low at 92, Cr 2.17 (BL 1.5), GFR 23 (BL 35),  VBG suggests resp alkalosis with pH 7.51, pco2 33.8, po2 57, bicarb 26. EKG: not collected   Imaging: none  Pt received: insulin gtt started 9:42PM, 2L NS, CTX 2g        SH: Alcohol Consumption: no,   Smoking: no   Drugs: no  Living arrangement:  and 2 sons ,   Ambulation: independent   Code Status: full     Chart reviewed. Patient seen, examined, and discussed with Dr. Nico Arreguin (PGY-1). See Resident H&P note for more details.     Pertinent PE Findings:   Physical Exam - unremarkable (see Dr. Francine Nair H&P for complete exam)     A/P: Symptoms likely d/t HSS and UTI.    HHS: likely 2/2 untreated UTI and poorly controlled DM. POA glucose 823, pH 7.51 and Bicarb 27 on VBG. UA >1000 glucose without Ketones, + nitrites and small LE. No anion gap, HDS and neurolgically intact. S/p 2L NS in ED.   -Admit to Stepdown   -NPO while on insulin gtt   - Glucose stabilizer protocol with insulin infusion started in ED   - Once glucose <250 for 4 hours, change to subq insulin and maintain gtt x2 hours   - Diabetes management consult  -  F/u betahydroxy and serum osmol   - CMP & Mag q4h  - POC BG checks Q1H  - Repeat A1c (12/20 was 7.0)   - 1/2NS 200mL/hr given corrected Na was 140 (add D5W to fluids when BG <250)  - repeat potassium given hemolyzed on admission, add if 20 of KCL to fluids if K <5.       UTI:UA with positive nitrites and small LE. No blood. Does not meet SIRS criteria. Did not tolerate Macrobid or Cipro outpatient due to nausea and did not follow up with PCP. Continued with sx's. Likely the cause of her HHS   -continue CTX at 1g q24hr for 7 days   -f/u UCx   -f/u LA & BCx        Elevated Cr in the setting of CKD 3b: POA 2.17 (Bl 1.5). GFR 23 (BL 35). -fluids as above   -BMP q4h  -hold nephrotoxic meds until cr improves   -recommend nephrology follow up and renal US outpatient        I agree with remaining assessment and plan as documented in Dr. Hemphill Hem note.       Pt discussed with Dr. Reyna Huynh (on-call attending physician)    Luis Flores DO  Family Medicine Resident PGY-2

## 2021-05-18 NOTE — PROGRESS NOTES
[facility]acy Dosing Services: Antimicrobial Stewardship Progress Note    Consult for antibiotic dosing of Vancomycin and auto-adjustment of Cefepime by Dr. Sotero Galvez  Pharmacist reviewed antibiotic appropriateness for 60year old , female  for indication of UTI  Day of Therapy 1    Plan:  Vancomycin therapy:  Start Vancomycin therapy, at 1.25 grams every 24 hours  Dose calculated to approximate a therapeutic trough of 10-15 mcg/mL. Last trough level / Plan for level: prior to 3rd dose  Pharmacy to follow daily and will make changes to dose and/or frequency based on clinical status. Non-Kinetic Antimicrobial Dosing: Cefepime, updated dosing to renal adjustment of 2 grams every 12 hours for CrCl 35-40. Cultures     Urine-pending  Blood-pending   Serum Creatinine     Lab Results   Component Value Date/Time    Creatinine 1.67 (H) 05/18/2021 01:53 AM       Creatinine Clearance Estimated Creatinine Clearance: 35.2 mL/min (A) (based on SCr of 1.67 mg/dL (H)).      WBC   Lab Results   Component Value Date/Time    WBC 7.5 05/17/2021 06:36 PM       H/H   Lab Results   Component Value Date/Time    HGB 12.1 05/17/2021 06:36 PM      Platelets Lab Results   Component Value Date/Time    PLATELET 994 94/44/2709 06:36 PM            Pharmacist: Vanessa Wilkins,[address] information:

## 2021-05-18 NOTE — PROGRESS NOTES
Problem: Falls - Risk of  Goal: *Absence of Falls  Description: Document Lennoxdenisha Lim Fall Risk and appropriate interventions in the flowsheet.   Outcome: Progressing Towards Goal  Note: Fall Risk Interventions:

## 2021-05-18 NOTE — ROUTINE PROCESS
Bedside shift change report given to Stephanie Arellano RN (oncoming nurse) by Franklin Martin RN (offgoing nurse). Report included the following information SBAR, Kardex, Intake/Output, MAR and Accordion.

## 2021-05-18 NOTE — PROGRESS NOTES
5/18/2021  11:17 AM    Reason for Admission:  Hyperglycemia   Assessment completed in person with patient. PMH: DMII; CKD3; HTN; HLD; GERD; depression; gout  Patient lives at home with her , Arnoldo Reddy ([phone]), and 2 of their kids. She is iADLs and drives at baseline. The home has 6 steps to enter, 10 steps inside. Patient states one of her children will transport her on discharge. Pt owns a glucometer, walker, and cane. She received [facility] in 2017 after a knee surgery but does not remember the company. Preferred Rx: [facility] - [address]                    RUR Score:     14%, low                Plan for utilizing home health:      PT/OT evals pending    PCP: First and Last name:  Ibeth Liu DO     Name of Practice:    Are you a current patient: Yes/No: Yes   Approximate date of last visit: \"a few weeks ago, virtual\"   Can you participate in a virtual visit with your PCP: Yes                    Current Advanced Directive/Advance Care Plan: Full Code      Healthcare Decision Maker:   Darvin Ruiz, spouse, [phone]                    Transition of Care Plan:                    1. Insulin gtt discontinued; IV antbx; DM education consult  2. PT/OT evals pending for needs  3. Children to transport on discharge  4. Outpatient follow-up  5. CM to continue to follow for needs    Care Management Interventions  PCP Verified by CM: Yes([facility])  Palliative Care Criteria Met (RRAT>21 & CHF Dx)?: No  Mode of Transport at Discharge:  Other (see comment)(family, kids)  Transition of Care Consult (CM Consult): Discharge Planning  MyChart Signup: No  Discharge Durable Medical Equipment: No  Physical Therapy Consult: Yes  Occupational Therapy Consult: Yes  Speech Therapy Consult: No  Current Support Network: Lives with Spouse  Confirm Follow Up Transport: Family  The Plan for Transition of Care is Related to the Following Treatment Goals : hyperglycemia  Discharge Location  Discharge Placement: Home with outpatient services    Denise Olsen RN

## 2021-05-18 NOTE — H&P
[address] [place] [address]   Office [phone]  Fax [phone]       Admission H&P     Name: Alo Locke MRN: [id number]  Sex: Female   YOB: 1955  Age: 60 y.o. PCP: Burak Anguiano DO     Source of Information: patient, medical records    Chief complaint: elevated blood sugar, urinary urgency/frequency    History of Present Illness  Alo Locke is a 60 y.o. female with PMHx of DM type II, CKD3B, HTN, HLD, GERD, depression, and gout who presents to the ED complaining of elevated blood glucose (too high to read on home glucometer). Patient reports generalized malaise, fatigue, blurry vision, polydipsia, and polyuria over the last 2 days. She has been checking her blood sugar twice daily and reports multiple high readings in the mornings (495, 600s) and other readings that were too high to register on her glucometer throughout the day. She called her PCP regarding this today and was advised to come to the ED. Patient notes that she has been dealing with urinary urgency/frequency and urge incontinence since the end of March. She saw her PCP and was prescribed Macrobid on 3/30, however she only took a few doses as it caused nausea/vomiting. Her PCP called in Cipro on 4/1, however this also caused nausea/vomiting and patient reports she only took a few doses before stopping. Her urinary symptoms have persisted since then. Patient denies any fever, chills, abdominal pain, flank pain, dysuria, hematuria, changes in bowel movements, CP, SOB, dizziness, cough, cold symptoms. COVID Questions:   Experiencing any of the following symptoms: fever, chills, cough, SOB, diarrhea, URI symptoms. no  Any Sick contacts with fever, cough, diarrhea, SOB, URI symptoms. no  Traveled out of state or out of country. no  Lives with her  and 2 sons. Has been staying at home. yes  Has received both COVID vaccines.      In the ED:  Vitals: Temp 96.9   BP 98/66      RR 18   SatO2  97% on room air  Labs: WBC 7.5, Hgb 12.1, Na 128 (corrected 140), K 4.7 (hemolyzed specimen), Cl 92, BUN 30, bicarb 27, Cr 2.17 (BL ~1.5), , AG 9, mag 2.0, phos 3.1; UA with > 1000 glucose, no ketones, + nitrites, small LE, few epi cells, WBC , no blood, no bacteria, budding yeast noted; VBG with pH 7.51, pCO2 33.8, pO2 57, bicarb 26  Imaging: None  Treatment: Given 2L NS bolus, rocephin 2 g IV, started on insulin gtt    EKG: Not perfomed     Patient Vitals for the past 12 hrs:   Temp Pulse Resp BP SpO2   05/17/21 1930    126/75 97 %   05/17/21 1918     95 %   05/17/21 1915    (!) 108/57 96 %   05/17/21 1900    (!) 106/54 92 %   05/17/21 1818 96.9 °F (36.1 °C) (!) 108 18 98/66 97 %       Review of Systems  Review of Systems   Constitutional: Positive for activity change and fatigue. Negative for chills and fever. HENT: Negative for congestion, ear pain and sore throat. Eyes: Positive for visual disturbance (blurry vision). Respiratory: Negative for cough and shortness of breath. Cardiovascular: Negative for chest pain and leg swelling. Gastrointestinal: Negative for abdominal pain, blood in stool, constipation, diarrhea, nausea and vomiting. Endocrine: Positive for polydipsia and polyuria. Genitourinary: Positive for frequency and urgency. Negative for dysuria, flank pain and hematuria. Musculoskeletal: Positive for back pain (chronic low back pain). Skin: Negative for rash and wound. Neurological: Negative for dizziness, syncope, light-headedness and headaches. Psychiatric/Behavioral: Negative for confusion. Home Medications   Prior to Admission medications    Medication Sig Start Date End Date Taking?  Authorizing Provider   PARoxetine (PAXIL) 30 mg tablet Take 1 tablet by mouth once daily 4/7/21   Taurus Reddy,    allopurinoL (ZYLOPRIM) 300 mg tablet Take 1 tablet by mouth once daily 12/29/20   Analisa Reddy,    lisinopril-hydroCHLOROthiazide Neftali, ZESTORETIC) 20-12.5 mg per tablet Take 1 tablet by mouth twice daily 12/11/20   Marshall Reddy DO   simvastatin (ZOCOR) 40 mg tablet Take 1 tablet by mouth nightly 12/11/20   Analisa Reddy DO   metFORMIN (GLUCOPHAGE) 1,000 mg tablet TAKE 1 TABLET BY MOUTH TWICE DAILY WITH MEALS 12/1/20   Analisa Reddy DO   diclofenac (Voltaren) 1 % gel Apply 4 g to affected area four (4) times daily. 10/10/20 5/17/21  Edwin Wesley MD   lidocaine (Lidoderm) 5 % Apply patch to the affected area for 12 hours a day and remove for 12 hours a day. 10/10/20 5/17/21  Edwin Wesley MD   pantoprazole (PROTONIX) 40 mg tablet Take 1 tablet by mouth once daily 10/6/20   Analisa Reddy DO   pioglitazone (ACTOS) 30 mg tablet Take 1 Tab by mouth daily. 4/1/20   Analisa Reddy DO   repaglinide (PRANDIN) 2 mg tablet Take 1 Tab by mouth Before breakfast, lunch, and dinner. 12/18/19 5/17/21  Lorie Bond DO   acetaminophen (TYLENOL) 325 mg tablet Take  by mouth every four (4) hours as needed for Pain. Provider, Historical   Insulin Needles, Disposable, (PEN NEEDLE) 31 gauge x 3/16\" ndle QHS use of lantus 3/9/16 5/17/21  Lorie Bond DO   Blood-Glucose Meter monitoring kit Please dispense one kit 10/10/15 5/17/21  Meena Campos MD   glucose blood VI test strips (BLOOD GLUCOSE TEST) strip Use four times a day, before meals and at bedtime, to check your blood sugar. 10/10/15 5/17/21  Meena Campos MD   ALPRAZolam Sofia Mejia) 0.25 mg tablet Take 0.25 mg by mouth as needed for Anxiety. Provider, Historical   omeprazole (PRILOSEC) 20 mg capsule Take 20 mg by mouth daily.   5/17/21  Provider, Historical       Allergies  Allergies   Allergen Reactions    Sulfa (Sulfonamide Antibiotics) Other (comments)     Emesis       Past Medical History  Past Medical History:   Diagnosis Date    Arthritis     Diabetes ([address]     Gout     Hernia, hiatal     Hypertension        Previous Hospitalization(s)  Past Surgical History:   Procedure Laterality Date    HX KNEE REPLACEMENT Right        Family History  Family History   Problem Relation Age of Onset    Cancer Mother     Heart Disease Father     Diabetes Maternal Grandmother        Social History  Alcohol history: Rarely (occasional wine cooler)  Smoking history: Non-smoker  Illicit drug history: Not at all  Living arrangement: patient lives with their family ( and 2 sons)  Ambulates: Independently     Vital Signs  Visit Vitals  /75   Pulse (!) 108   Temp 96.9 °F (36.1 °C)   Resp 18   Ht 5' 2\" (1.575 m)   Wt 198 lb 10.2 oz (90.1 kg)   SpO2 97%   BMI 36.33 kg/m²       Physical Exam  General: No acute distress. Alert. Cooperative. Obese. Head: Normocephalic. Atraumatic. Eyes:              Conjunctiva pink. Sclera white. PERRL. Ears:              Hearing grossly intact. Nose:             Septum midline. Mucosa pink. No drainage. Throat: Mucosa pink. Lips slightly dry. No tonsillar exudates or erythema. Palate movement equal bilaterally. Neck: Supple. Normal ROM. No stiffness. Respiratory: CTAB. No w/r/r. No increased work of breathing. Cardiovascular: Mildly tachycardic. Regular rhythm. Normal S1,S2. No m/r/g. Pulses 2+ throughout. GI: + bowel sounds. Nontender. Nondistended. No CVA tenderness. Extremities: No LE edema. Distal pulses intact. Skin: Warm, dry. No rashes. Neuro: Alert and oriented x4. No focal neurological deficits.         Laboratory Data  Recent Results (from the past 8 hour(s))   GLUCOSE, POC    Collection Time: 05/17/21  6:22 PM   Result Value Ref Range    Glucose (POC) >600 (HH) 65 - 117 mg/dL    Performed by Maritza Perry    CBC WITH AUTOMATED DIFF    Collection Time: 05/17/21  6:36 PM   Result Value Ref Range    WBC 7.5 3.6 - 11.0 K/uL    RBC 4.08 3.80 - 5.20 M/uL    HGB 12.1 11.5 - 16.0 g/dL    HCT 38.6 35.0 - 47.0 %    MCV 94.6 80.0 - 99.0 FL    MCH 29.7 26.0 - 34.0 PG    MCHC 31.3 30.0 - 36.5 g/dL    RDW 14.3 11.5 - 14.5 % PLATELET 015 685 - 169 K/uL    NRBC 0.0 0  WBC    ABSOLUTE NRBC 0.00 0.00 - 0.01 K/uL    NEUTROPHILS 56 32 - 75 %    LYMPHOCYTES 33 12 - 49 %    MONOCYTES 8 5 - 13 %    EOSINOPHILS 2 0 - 7 %    BASOPHILS 1 0 - 1 %    IMMATURE GRANULOCYTES 0 0.0 - 0.5 %    ABS. NEUTROPHILS 4.2 1.8 - 8.0 K/UL    ABS. LYMPHOCYTES 2.5 0.8 - 3.5 K/UL    ABS. MONOCYTES 0.6 0.0 - 1.0 K/UL    ABS. EOSINOPHILS 0.2 0.0 - 0.4 K/UL    ABS. BASOPHILS 0.1 0.0 - 0.1 K/UL    ABS. IMM. GRANS. 0.0 0.00 - 0.04 K/UL    DF AUTOMATED     METABOLIC PANEL, COMPREHENSIVE    Collection Time: 05/17/21  6:36 PM   Result Value Ref Range    Sodium 128 (L) 136 - 145 mmol/L    Potassium 4.7 3.5 - 5.1 mmol/L    Chloride 92 (L) 97 - 108 mmol/L    CO2 27 21 - 32 mmol/L    Anion gap 9 5 - 15 mmol/L    Glucose 823 (HH) 65 - 100 mg/dL    BUN 30 (H) 6 - 20 MG/DL    Creatinine 2.17 (H) 0.55 - 1.02 MG/DL    BUN/Creatinine ratio 14 12 - 20      GFR est AA 28 (L) >60 ml/min/1.73m2    GFR est non-AA 23 (L) >60 ml/min/1.73m2    Calcium 9.6 8.5 - 10.1 MG/DL    Bilirubin, total 0.4 0.2 - 1.0 MG/DL    ALT (SGPT) 38 12 - 78 U/L    AST (SGOT) 33 15 - 37 U/L    Alk.  phosphatase 138 (H) 45 - 117 U/L    Protein, total 7.1 6.4 - 8.2 g/dL    Albumin 3.9 3.5 - 5.0 g/dL    Globulin 3.2 2.0 - 4.0 g/dL    A-G Ratio 1.2 1.1 - 2.2     MAGNESIUM    Collection Time: 05/17/21  6:36 PM   Result Value Ref Range    Magnesium 2.0 1.6 - 2.4 mg/dL   PHOSPHORUS    Collection Time: 05/17/21  6:36 PM   Result Value Ref Range    Phosphorus 3.1 2.6 - 4.7 MG/DL   URINALYSIS W/MICROSCOPIC    Collection Time: 05/17/21  6:37 PM   Result Value Ref Range    Color YELLOW/STRAW      Appearance HAZY (A) CLEAR      Specific gravity 1.029 1.003 - 1.030      pH (UA) 5.5 5.0 - 8.0      Protein Negative NEG mg/dL    Glucose >1,000 (A) NEG mg/dL    Ketone Negative NEG mg/dL    Bilirubin Negative NEG      Blood Negative NEG      Urobilinogen 0.2 0.2 - 1.0 EU/dL    Nitrites Positive (A) NEG      Leukocyte Esterase SMALL (A) NEG      WBC  0 - 4 /hpf    RBC 0-5 0 - 5 /hpf    Epithelial cells FEW FEW /lpf    Bacteria Negative NEG /hpf    Budding yeast PRESENT (A) NEG     URINE CULTURE HOLD SAMPLE    Collection Time: 05/17/21  6:37 PM    Specimen: Serum; Urine   Result Value Ref Range    Urine culture hold        Urine on hold in Microbiology dept for 2 days. If unpreserved urine is submitted, it cannot be used for addtional testing after 24 hours, recollection will be required. BLOOD GAS, VENOUS    Collection Time: 05/17/21  6:50 PM   Result Value Ref Range    VENOUS PH 7.51 (HH) 7.32 - 7.42      VENOUS PCO2 33.8 (L) 41 - 51 mmHg    VENOUS PO2 57 (H) 25 - 40 mmHg    VENOUS BICARBONATE 26 23 - 28 mmol/L    VENOUS BASE EXCESS 3.4 mmol/L    VENOUS O2 SATURATION 92 (H) 65 - 88 %    O2 METHOD ROOM AIR      Sample source VENOUS BLOOD      SITE OTHER      Critical value read back Called to BRANDYN Kurtz RN on 05/17/2021 at 18:52        Imaging  CXR Results  (Last 48 hours)    None        CT Results  (Last 48 hours)    None            Assessment and Plan     Sarah Rosas is a 60 y.o. female with a PMHx of DM type II, CKD3B, HTN, HLD, GERD, depression, and gout who is admitted for HHS and UTI. HHS in the setting of type II DM: POA , pH 7.51 (VBG with resp alkalosis), bicarb 27, AG 9. No ketonuria, however glucose > 1000 on UA. No confusion. Likely related to underlying UTI and poorly controlled DM. Last A1c 7% in December 2020. Patient is only on home Pioglitazone 30 mg daily at this time. Metformin was stopped by PCP due to borderline GFR.   - Admit to stepdown unit   - VS per unit routine   - Continue glucostabilizer per protocol   - POC glucose q1h, BMP and mag q4h  - K hemolyzed on initial labs, will replete PRN  - Start 0.45% NS at 200 mL/hour. Plan to transition to D5 0.45% NS when BG <250.   - Plan to start SQ insulin and order diet when BG <250 x 4 hours.  Will continue insulin gtt for 2 hours after starting SQ insulin. - Serum osmolality and A1c pending   - Holding home Pioglitazone    - Hypoglycemia protocols ordered  - Diabetes education consulted     UTI without hematuria: Ongoing urinary urgency/frequency since March. Noncompliance with outpatient therapy secondary to nausea/vomiting with PO ABX (took partial courses of Macrobid followed by Cipro). No leukocytosis, fever, CVA tenderness, abdominal pain. UA with + nitrites, small LE,  WBC, no blood, few epi cells, no bacteria but budding yeast noted  - S/p Rocephin 2 g IV in the ED  - Continue Rocephin 1 g IV q24h  - BCx and UCx pending   - Monitor I/Os    Elevated lactate: Only 1/4 SIRS (HR) POA, however LA 2.5. Elevated lactate likely related to HHS. - Trend lactate q4h  - IV hydration as above     Elevated creatinine superimposed on CKD3B: POA Cr 2.17 (BL ~1.5), eGFR 23 (BL~30-35). Estimated Creatinine Clearance: 27.3 mL/min. Secondary to IVVD in the setting of HHS. Expect to improve with IV hydration.   - Continue IVF as above  - Strict I/Os  - Avoid nephrotoxic agents - holding home Lisinopril, HCTZ, allopurinol   - Consider outpatient follow up with nephrology and renal US    HTN: BP on admission 98/66. On Prinzide 40-25 mg daily at home.   - HOLD home medications of Lisinopril andHCTZ due to elevated Cr  - Will continue to monitor at this time and readjust as BPs trend    HLD: Last lipid panel from December 2020 with total chol 181, , HDL 64, LDL 89.4. On simvastatin 40 mg qhs at home. - Sub home simvastatin for atorvastatin 20 mg qhs while inpatient     GERD: Stable on home Protonix 40 mg daily   - Continue home Protonix     Depression: Stable on home Paxil 30 mg qhs  - Continue home medication     Gout: No recent flared. Takes Allopurinol 300 mg daily.   - HOLD home allopurinol 300 mg daily   - Consider resuming renally dosed allopurinol on discharge     Obesity:  - The pt's Body mass index is 36.33 kg/m².   - Encouraging lifestyle modifications and further follow up outpatient. FEN/GI - NPO. 1/2 NS at 200 mL/hour. Activity - Ambulate as tolerated  DVT prophylaxis - Sub Q Heparin  GI prophylaxis - Protonix  Fall prophylaxis - Not indicated at this time. Disposition - Admit to Stepdown. Plan to d/c to Home. Consulting PT, OT and CM  Code Status - Full. Discussed with patient / caregivers. Next of Kin Name and Isrrael Pat [address] [facility] [phone]    Patient Gregoria Man will be discussed with Dr. Obey Nails. 9:04 PM, 05/17/21  Miguel Menendez DO   Family Medicine Resident       For Billing    Chief Complaint   Patient presents with   [facility] Sugar       Hospital Problems  Date Reviewed: 3/30/2021          Codes Class Noted POA    UTI (urinary tract infection) ICD-10-CM: N39.0  ICD-9-CM: 599.0  5/17/2021 Unknown        Type 2 diabetes mellitus with hyperosmolar hyperglycemic state (HHS) [facility]) ICD-10-CM: E11.00, E11.65  ICD-9-CM: 250.22  5/17/2021 Unknown               [address] Medicine Residency Attending Addendum:  I discussed the findings, assessment and plan with the resident and agree with the resident's findings and plan as documented in the resident's note.       Leta Dove MD, FAAFP, CAQSM, RMSK

## 2021-05-18 NOTE — PROGRESS NOTES
Problem: Patient Education: Go to Patient Education Activity  Goal: Patient/Family Education  Outcome: Progressing Towards Goal     Problem: Patient Education: Go to Patient Education Activity  Goal: Patient/Family Education  Outcome: Progressing Towards Goal     Problem: Urinary Tract Infection - Adult  Goal: *Absence of infection signs and symptoms  Outcome: Progressing Towards Goal     Problem: Patient Education: Go to Patient Education Activity  Goal: Patient/Family Education  Outcome: Progressing Towards Goal

## 2021-05-18 NOTE — ROUTINE PROCESS
Bedside and Verbal shift change report given to [address] (oncoming nurse) by Tanya Mcdaniel RN (offgoing nurse). Report included the following information SBAR, Kardex, Recent Results and Cardiac Rhythm NSR.  
 
0756 Insulin gtt stopped and patient given 5 units of insulin as per sliding protocol. . IV fluids changed to NS at 50ml/hr. 1000 Dual skin assessment completed. CHG bath given. 1450 TRANSFER - OUT REPORT: 
 
Verbal report given to Preeti BENTON(name) on Yesenia Francisco  being transferred to 5th floor medical(unit) for routine progression of care Report consisted of patients Situation, Background, Assessment and  
Recommendations(SBAR). Information from the following report(s) SBAR, Kardex, Recent Results and Cardiac Rhythm NSR was reviewed with the receiving nurse. Lines:  
Peripheral IV 05/17/21 Right Hand (Active) Site Assessment Clean, dry, & intact 05/18/21 1200 Phlebitis Assessment 0 05/18/21 1200 Infiltration Assessment 0 05/18/21 1200 Dressing Status Clean, dry, & intact 05/18/21 1200 Dressing Type Transparent 05/18/21 1200 Hub Color/Line Status Blue; Infusing;Patent 05/18/21 1200 Action Taken Open ports on tubing capped 05/18/21 1200 Alcohol Cap Used Yes 05/18/21 1200 Opportunity for questions and clarification was provided. Patient transported with: 
 [employer]

## 2021-05-18 NOTE — PROGRESS NOTES
Problem: Falls - Risk of  Goal: *Absence of Falls  Description: Document Arun Lawrence Fall Risk and appropriate interventions in the flowsheet.   Outcome: Progressing Towards Goal  Note: Fall Risk Interventions:  Mobility Interventions: Bed/chair exit alarm, OT consult for ADLs, Patient to call before getting OOB, PT Consult for mobility concerns         Medication Interventions: Bed/chair exit alarm, Patient to call before getting OOB    Elimination Interventions: Bed/chair exit alarm, Call light in reach, Toileting schedule/hourly rounds              Problem: Patient Education: Go to Patient Education Activity  Goal: Patient/Family Education  Outcome: Progressing Towards Goal     Problem: DKA: Day 1  Goal: Nutrition/Diet  Outcome: Progressing Towards Goal  Goal: Medications  Outcome: Progressing Towards Goal  Goal: *Hemodynamically stable  Outcome: Progressing Towards Goal

## 2021-05-18 NOTE — PROGRESS NOTES
OCCUPATIONAL THERAPY EVALUATION/DISCHARGE  Patient: Jamie Dailey (80 y.o. female)  Date: 5/18/2021  Primary Diagnosis: Type 2 diabetes mellitus with hyperosmolar hyperglycemic state (HHS) ([facility] [address] [E11.00, E11.65]  UTI (urinary tract infection) [N39.0]       Precautions:   Fall    ASSESSMENT  Based on the objective data described below, the patient presents with increased activity tolerance, improved blurry vision and likely near her baseline, recommend to call for assist for out of bed, feel she will continue to progress and further OT not indicated. Current Level of Function (ADLs/self-care): supervision to mod I basic ADL's and  mobility    Functional Outcome Measure: The patient scored 60/100 on the Barthel Index outcome measure  Other factors to consider for discharge:      PLAN :  Recommend with staff: Recommend with nursing, ADLs with supervision/setup, once Egress Test completed then OOB to chair 3x/day  and toileting via functional mobility to and from bathroom. Thank you for completing as able in order to maintain patient strength, endurance and independence. Recommendation for discharge: (in order for the patient to meet his/her long term goals)  No skilled occupational therapy/ follow up rehabilitation needs identified at this time. This discharge recommendation:  Has not yet been discussed the attending provider and/or case management    IF patient discharges home will need the following DME: none       SUBJECTIVE:   Patient stated It's better.  re: blurry vision    OBJECTIVE DATA SUMMARY:   HISTORY:   Past Medical History:   Diagnosis Date    Arthritis     Diabetes ([facility] [address]     Gout     Hernia, hiatal     Hypertension      Past Surgical History:   Procedure Laterality Date    HX KNEE REPLACEMENT Right        Prior Level of Function/Environment/Context: lives with her  and 2 children, independent, recent uncontrolled blood sugar  Expanded or extensive additional review of patient history:   Home Situation  Home Environment: Private residence  # Steps to Enter: 5  Rails to Enter: Yes  One/Two Story Residence: Two story  Living Alone: No  Support Systems: Spouse/Significant Other/Partner  Patient Expects to be Discharged to[de-identified] Private residence  Current DME Used/Available at Home: Cindy Creed, quad, Walker, rolling, Commode, bedside, Grab bars  Tub or Shower Type: Tub/Shower combination    Hand dominance: Right    EXAMINATION OF PERFORMANCE DEFICITS:  Cognitive/Behavioral Status:  Neurologic State: Alert  Orientation Level: Oriented X4  Cognition: Follows commands; Appropriate decision making; Appropriate for age attention/concentration; Appropriate safety awareness  Perception: Appears intact  Perseveration: No perseveration noted  Safety/Judgement: Awareness of environment; Fall prevention;Home safety; Insight into deficits    Skin: intact    Edema: none noted    Hearing:   Auditory  Auditory Impairment: None    Vision/Perceptual:            Acuity: (reports blurry vision improved)    Corrective Lenses: Reading glasses    Range of Motion:  AROM: Generally decreased, functional        Strength:  Strength: Generally decreased, functional        Coordination:  Coordination: Within functional limits            Tone & Sensation:  Tone: Normal  Sensation: Intact(LEs)        Balance:  Sitting: Intact  Standing: Without support  Standing - Static: Good  Standing - Dynamic : Good    Functional Mobility and Transfers for ADLs:  Bed Mobility:  Supine to Sit: Modified independent  Sit to Supine: Modified independent  Scooting: Modified independent    Transfers:  Sit to Stand: Supervision  Stand to Sit: Supervision  Bed to Chair: Supervision  Bathroom Mobility: Supervision/set up  Toilet Transfer : Supervision    ADL Assessment:  Feeding: Independent    Oral Facial Hygiene/Grooming: Modified Independent    Bathing: Other (comment)(instructed in compensatory techniques/safety)    Upper Body Dressing: Independent    Lower Body Dressing: Modified independent; Other (comment)(instructed in compensatory techniques to avoid forward bendi)    Toileting: Supervision           ADL Intervention and task modifications:   educated on role of OT, fall prevention in hospital and use of call bell, provided bedside commode versus pur-wic and instructed to call for assist to and from bathroom and use of commode if urgency. Instructed on benefit of tailor sitting for LE dressing versus forward bending to prevent increased ocular pressure and risk of decreased vision due to hx of diabetes and recently uncontrolled      Patient instructed and indicated understanding the benefits of maintaining activity tolerance, functional mobility, and independence with self care tasks during acute stay  to ensure safe return home and to baseline. Encouraged patient to increase frequency and duration OOB, be out of bed for all meals, perform daily ADLs (as approved by RN/MD regarding bathing etc), and performing functional mobility to/from bathroom. Cognitive Retraining  Safety/Judgement: Awareness of environment; Fall prevention;Home safety; Insight into deficits         Functional Measure:  Barthel Index:    Bathin  Bladder: 5  Bowels: 10  Groomin  Dressin  Feeding: 10  Mobility: 10  Stairs: 0  Toilet Use: 5  Transfer (Bed to Chair and Back): 10  Total: 60/100        The Barthel ADL Index: Guidelines  1. The index should be used as a record of what a patient does, not as a record of what a patient could do. 2. The main aim is to establish degree of independence from any help, physical or verbal, however minor and for whatever reason. 3. The need for supervision renders the patient not independent. 4. A patient's performance should be established using the best available evidence. Asking the patient, friends/relatives and nurses are the usual sources, but direct observation and common sense are also important.  However direct testing is not needed. 5. Usually the patient's performance over the preceding 24-48 hours is important, but occasionally longer periods will be relevant. 6. Middle categories imply that the patient supplies over 50 per cent of the effort. 7. Use of aids to be independent is allowed. Classie Serum., Barthel, D.W. (1898). Functional evaluation: the Barthel Index. [facility] (14)2. DENISE Dc, Arnold Doe., Jesus Henriquez., [place], [facility] (1999). Measuring the change indisability after inpatient rehabilitation; comparison of the responsiveness of the Barthel Index and Functional [place] Measure. Journal of Neurology, Neurosurgery, and Psychiatry, 66(4), 219-200. Carter Sandhoff, N.J.A, LISS Schumacher, & Timmy Posada M.A. (2004.) Assessment of post-stroke quality of life in cost-effectiveness studies: The usefulness of the Barthel Index and the EuroQoL-5D. Quality of Life Research, 15, 907-21         Occupational Therapy Evaluation Charge Determination   History Examination Decision-Making   LOW Complexity : Brief history review  LOW Complexity : 1-3 performance deficits relating to physical, cognitive , or psychosocial skils that result in activity limitations and / or participation restrictions  LOW Complexity : No comorbidities that affect functional and no verbal or physical assistance needed to complete eval tasks       Based on the above components, the patient evaluation is determined to be of the following complexity level: LOW   Pain Rating:  No complaint    Activity Tolerance:   Good and Fair    After treatment patient left in no apparent distress:    Supine in bed, Call bell within reach, Bed / chair alarm activated and Side rails x 3    COMMUNICATION/EDUCATION:   The patients plan of care was discussed with: Physical therapist and Registered nurse.      Thank you for this referral.  LISA Hernandez/L  Time Calculation: 24 mins

## 2021-05-18 NOTE — PROGRESS NOTES
Problem: Mobility Impaired (Adult and Pediatric)  Goal: *Acute Goals and Plan of Care (Insert Text)  Description: FUNCTIONAL STATUS PRIOR TO ADMISSION: Pt is completely independent with functional mobility at community and household levels. She cares for 1year old child 24-7. HOME SUPPORT PRIOR TO ADMISSION: The patient lived with family and did not require assistance. Physical Therapy Goals  Initiated 5/18/2021  1. Patient will move from supine to sit and sit to supine  in bed with modified independence within 7 day(s). 2.  Patient will transfer from bed to chair and chair to bed with modified independence using the least restrictive device within 7 day(s). 3.  Patient will perform sit to stand with modified independence within 7 day(s). 4.  Patient will ambulate with modified independence for 450 feet with the least restrictive device within 7 day(s). 5.  Patient will ascend/descend 14 stairs with one handrail(s) with modified independence within 7 day(s). Outcome: Not Met   PHYSICAL THERAPY EVALUATION  Patient: Isidra Puckett (79 y.o. female)  Date: 5/18/2021  Primary Diagnosis: Type 2 diabetes mellitus with hyperosmolar hyperglycemic state (HHS) ([facility] [address] [E11.00, E11.65]  UTI (urinary tract infection) [N39.0]        Precautions: Universal       ASSESSMENT  Based on the objective data described below, the patient presents with generally decreased strength, generalized fatigue, and limited activity tolerance on day 1 of admission with hyperglycemia and UTI. Pt requiring GTT insulin overnight and reports little sleep associated with activity I room. Pt with improved blood glucose and has transitioned to sub q insulin. She offers good participation and tolerates in-room mobility with supervision to SBA this morning. BP low but stable and she denies presyncopal symptoms. Current Level of Function Impacting Discharge (mobility/balance): supervision    Functional Outcome Measure:   The patient scored 25 on the Tinetti outcome measure which is indicative of low fall risk. Other factors to consider for discharge: none additional     Patient will benefit from skilled therapy intervention to address the above noted impairments. PLAN :  Recommendations and Planned Interventions: bed mobility training, transfer training, gait training, therapeutic exercises, neuromuscular re-education, patient and family training/education, and therapeutic activities      Frequency/Duration: Patient will be followed by physical therapy:  5 times a week to address goals. Recommendation for discharge: (in order for the patient to meet his/her long term goals)  No skilled physical therapy/ follow up rehabilitation needs identified at this time. This discharge recommendation:  Has not yet been discussed the attending provider and/or case management    IF patient discharges home will need the following DME: none         SUBJECTIVE:   Patient stated I'd like to try to get some sleep.     Pt received supine, agreeable to PT and cleared by RN. OBJECTIVE DATA SUMMARY:   HISTORY:    Past Medical History:   Diagnosis Date    Arthritis     Diabetes ([facility] [address]     Gout     Hernia, hiatal     Hypertension      Past Surgical History:   Procedure Laterality Date    HX KNEE REPLACEMENT Right        Personal factors and/or comorbidities impacting plan of care: as above    Home Situation  Home Environment: Private residence  # Steps to Enter: 5  Rails to Enter: Yes  One/Two Story Residence: Two story  Living Alone: No  Support Systems: Spouse/Significant Other/Partner, Child(michael)  Patient Expects to be Discharged to[de-identified] Private residence  Current DME Used/Available at Home: Forrestine Must, straight, Walker, rolling, Commode, bedside, Grab bars  Tub or Shower Type: Tub/Shower combination    EXAMINATION/PRESENTATION/DECISION MAKING:   Critical Behavior:  Neurologic State: Alert  Orientation Level: Oriented X4        Hearing:   Auditory  Auditory Impairment: None  Skin:  LE exposed skin intact  Edema: none noted LEs  Range Of Motion:  AROM: Generally decreased, functional                       Strength:    Strength: Generally decreased, functional                    Tone & Sensation:   Tone: Normal              Sensation: Intact(LEs)               Coordination:  Coordination: Within functional limits       Functional Mobility:  Bed Mobility:           Scooting: Modified independent  Transfers:  Sit to Stand: Supervision  Stand to Sit: Supervision                       Balance:   Sitting: Intact  Standing: Without support  Standing - Static: Good  Standing - Dynamic : Good  Ambulation/Gait Training:  Distance (ft): (25' x 2)  Assistive Device: Gait belt  Ambulation - Level of Assistance: Supervision(+assist for lines/leads)        Gait Abnormalities: Decreased step clearance              Speed/Mitali: Pace decreased (<100 feet/min)                 No loss of balance; ambulates with flexed trunk and increased lateral lean        Functional Measure:  Tinetti test:    Sitting Balance: 1  Arises: 2  Attempts to Rise: 2  Immediate Standing Balance: 2  Standing Balance: 2  Nudged: 2  Eyes Closed: 0  Turn 360 Degrees - Continuous/Discontinuous: 1  Turn 360 Degrees - Steady/Unsteady: 1  Sitting Down: 2  Balance Score: 15 Balance total score  Indication of Gait: 1  R Step Length/Height: 1  L Step Length/Height: 1  R Foot Clearance: 1  L Foot Clearance: 1  Step Symmetry: 1  Step Continuity: 1  Path: 1  Trunk: 1  Walking Time: 1  Gait Score: 10 Gait total score  Total Score: 25/28 Overall total score         Tinetti Tool Score Risk of Falls  <19 = High Fall Risk  19-24 = Moderate Fall Risk  25-28 = Low Fall Risk  Tinetti ME. Performance-Oriented Assessment of Mobility Problems in Elderly Patients. [address]; [id number].  (Scoring Description: PT Bulletin Feb. 10, 1993)    Older adults: Zelda Leiva et al, 2009; n = [address] [employer] elderly evaluated with ABC, ADRIAN, ADL, and IADL)  · Mean ADRIAN score for males aged 69-68 years = 26.21(3.40)  · Mean ADRIAN score for females age 69-68 years = 25.16(4.30)  · Mean ADRIAN score for males over 80 years = 23.29(6.02)  · Mean ADRIAN score for females over [de-identified] years = 17.20(8.32)            Physical Therapy Evaluation Charge Determination   History Examination Presentation Decision-Making   HIGH Complexity :3+ comorbidities / personal factors will impact the outcome/ POC  HIGH Complexity : 4+ Standardized tests and measures addressing body structure, function, activity limitation and / or participation in recreation  MEDIUM Complexity : Evolving with changing characteristics  Other outcome measures barthel  MEDIUM      Based on the above components, the patient evaluation is determined to be of the following complexity level: MEDIUM    Pain Rating:  Denies pain    Activity Tolerance:   Good    After treatment patient left in no apparent distress:   Supine in bed, Call bell within reach, Bed / chair alarm activated, Side rails x 3, and PCT present    COMMUNICATION/EDUCATION:   The patients plan of care was discussed with: Occupational therapist and Registered nurse. Fall prevention education was provided and the patient/caregiver indicated understanding., Patient/family have participated as able in goal setting and plan of care. , and Patient/family agree to work toward stated goals and plan of care.     Thank you for this referral.  Heidy Tolentino, PT, DPT   Time Calculation: 25 mins

## 2021-05-19 VITALS
RESPIRATION RATE: 18 BRPM | BODY MASS INDEX: 39.4 KG/M2 | HEIGHT: 62 IN | HEART RATE: 88 BPM | SYSTOLIC BLOOD PRESSURE: 119 MMHG | TEMPERATURE: 97.9 F | DIASTOLIC BLOOD PRESSURE: 77 MMHG | WEIGHT: 214.1 LBS | OXYGEN SATURATION: 97 %

## 2021-05-19 PROBLEM — N18.32 STAGE 3B CHRONIC KIDNEY DISEASE (HCC): Status: ACTIVE | Noted: 2021-05-19

## 2021-05-19 LAB
ALBUMIN SERPL-MCNC: 3 G/DL (ref 3.5–5)
ALBUMIN/GLOB SERPL: 0.9 {RATIO} (ref 1.1–2.2)
ALP SERPL-CCNC: 100 U/L (ref 45–117)
ALT SERPL-CCNC: 29 U/L (ref 12–78)
ANION GAP SERPL CALC-SCNC: 2 MMOL/L (ref 5–15)
AST SERPL-CCNC: 27 U/L (ref 15–37)
BASOPHILS # BLD: 0 K/UL (ref 0–0.1)
BASOPHILS NFR BLD: 1 % (ref 0–1)
BILIRUB SERPL-MCNC: 0.3 MG/DL (ref 0.2–1)
BUN SERPL-MCNC: 22 MG/DL (ref 6–20)
BUN/CREAT SERPL: 15 (ref 12–20)
CALCIUM SERPL-MCNC: 9 MG/DL (ref 8.5–10.1)
CHLORIDE SERPL-SCNC: 105 MMOL/L (ref 97–108)
CO2 SERPL-SCNC: 29 MMOL/L (ref 21–32)
CREAT SERPL-MCNC: 1.47 MG/DL (ref 0.55–1.02)
DIFFERENTIAL METHOD BLD: ABNORMAL
EOSINOPHIL # BLD: 0.3 K/UL (ref 0–0.4)
EOSINOPHIL NFR BLD: 5 % (ref 0–7)
ERYTHROCYTE [DISTWIDTH] IN BLOOD BY AUTOMATED COUNT: 14.1 % (ref 11.5–14.5)
GLOBULIN SER CALC-MCNC: 3.4 G/DL (ref 2–4)
GLUCOSE BLD STRIP.AUTO-MCNC: 275 MG/DL (ref 65–117)
GLUCOSE BLD STRIP.AUTO-MCNC: 386 MG/DL (ref 65–117)
GLUCOSE SERPL-MCNC: 296 MG/DL (ref 65–100)
HCT VFR BLD AUTO: 33.9 % (ref 35–47)
HGB BLD-MCNC: 10.3 G/DL (ref 11.5–16)
IMM GRANULOCYTES # BLD AUTO: 0 K/UL (ref 0–0.04)
IMM GRANULOCYTES NFR BLD AUTO: 1 % (ref 0–0.5)
LYMPHOCYTES # BLD: 2.4 K/UL (ref 0.8–3.5)
LYMPHOCYTES NFR BLD: 39 % (ref 12–49)
MAGNESIUM SERPL-MCNC: 1.8 MG/DL (ref 1.6–2.4)
MCH RBC QN AUTO: 29.1 PG (ref 26–34)
MCHC RBC AUTO-ENTMCNC: 30.4 G/DL (ref 30–36.5)
MCV RBC AUTO: 95.8 FL (ref 80–99)
MONOCYTES # BLD: 0.4 K/UL (ref 0–1)
MONOCYTES NFR BLD: 6 % (ref 5–13)
NEUTS SEG # BLD: 3 K/UL (ref 1.8–8)
NEUTS SEG NFR BLD: 49 % (ref 32–75)
NRBC # BLD: 0 K/UL (ref 0–0.01)
NRBC BLD-RTO: 0 PER 100 WBC
PLATELET # BLD AUTO: 144 K/UL (ref 150–400)
PMV BLD AUTO: 12.8 FL (ref 8.9–12.9)
POTASSIUM SERPL-SCNC: 4 MMOL/L (ref 3.5–5.1)
PROT SERPL-MCNC: 6.4 G/DL (ref 6.4–8.2)
RBC # BLD AUTO: 3.54 M/UL (ref 3.8–5.2)
SERVICE CMNT-IMP: ABNORMAL
SERVICE CMNT-IMP: ABNORMAL
SODIUM SERPL-SCNC: 136 MMOL/L (ref 136–145)
WBC # BLD AUTO: 6.2 K/UL (ref 3.6–11)

## 2021-05-19 PROCEDURE — 99233 SBSQ HOSP IP/OBS HIGH 50: CPT | Performed by: CLINICAL NURSE SPECIALIST

## 2021-05-19 PROCEDURE — 83735 ASSAY OF MAGNESIUM: CPT

## 2021-05-19 PROCEDURE — 36415 COLL VENOUS BLD VENIPUNCTURE: CPT

## 2021-05-19 PROCEDURE — 85025 COMPLETE CBC W/AUTO DIFF WBC: CPT

## 2021-05-19 PROCEDURE — 74011250636 HC RX REV CODE- 250/636: Performed by: STUDENT IN AN ORGANIZED HEALTH CARE EDUCATION/TRAINING PROGRAM

## 2021-05-19 PROCEDURE — 82962 GLUCOSE BLOOD TEST: CPT

## 2021-05-19 PROCEDURE — 74011636637 HC RX REV CODE- 636/637: Performed by: STUDENT IN AN ORGANIZED HEALTH CARE EDUCATION/TRAINING PROGRAM

## 2021-05-19 PROCEDURE — 74011250637 HC RX REV CODE- 250/637: Performed by: STUDENT IN AN ORGANIZED HEALTH CARE EDUCATION/TRAINING PROGRAM

## 2021-05-19 PROCEDURE — 80053 COMPREHEN METABOLIC PANEL: CPT

## 2021-05-19 PROCEDURE — 99238 HOSP IP/OBS DSCHRG MGMT 30/<: CPT | Performed by: FAMILY MEDICINE

## 2021-05-19 RX ORDER — INSULIN LISPRO 100 [IU]/ML
5 INJECTION, SOLUTION INTRAVENOUS; SUBCUTANEOUS
Status: DISCONTINUED | OUTPATIENT
Start: 2021-05-19 | End: 2021-05-19 | Stop reason: HOSPADM

## 2021-05-19 RX ORDER — IBUPROFEN 200 MG
CAPSULE ORAL
Qty: 100 STRIP | Refills: 0 | Status: SHIPPED | OUTPATIENT
Start: 2021-05-19 | End: 2021-06-24

## 2021-05-19 RX ORDER — LISINOPRIL 20 MG/1
20 TABLET ORAL DAILY
Qty: 30 TABLET | Refills: 0 | Status: SHIPPED | OUTPATIENT
Start: 2021-05-19 | End: 2021-05-25

## 2021-05-19 RX ORDER — LISINOPRIL 20 MG/1
20 TABLET ORAL DAILY
Status: DISCONTINUED | OUTPATIENT
Start: 2021-05-19 | End: 2021-05-19 | Stop reason: HOSPADM

## 2021-05-19 RX ORDER — BLOOD SUGAR DIAGNOSTIC
STRIP MISCELLANEOUS
Qty: 100 STRIP | Refills: 0 | Status: SHIPPED
Start: 2021-05-19 | End: 2021-05-19 | Stop reason: SDUPTHER

## 2021-05-19 RX ORDER — CEPHALEXIN 500 MG/1
500 CAPSULE ORAL 2 TIMES DAILY
Qty: 6 CAPSULE | Refills: 0 | Status: SHIPPED | OUTPATIENT
Start: 2021-05-19 | End: 2021-05-26

## 2021-05-19 RX ORDER — INSULIN GLARGINE 100 [IU]/ML
40 INJECTION, SOLUTION SUBCUTANEOUS DAILY
Status: DISCONTINUED | OUTPATIENT
Start: 2021-05-19 | End: 2021-05-19 | Stop reason: HOSPADM

## 2021-05-19 RX ORDER — BLOOD-GLUCOSE METER
1 EACH MISCELLANEOUS
Qty: 1 EACH | Refills: 0 | Status: SHIPPED
Start: 2021-05-19 | End: 2021-05-19 | Stop reason: SDUPTHER

## 2021-05-19 RX ORDER — INSULIN LISPRO 100 [IU]/ML
5 INJECTION, SOLUTION INTRAVENOUS; SUBCUTANEOUS
Qty: 2 PEN | Refills: 0 | Status: SHIPPED | OUTPATIENT
Start: 2021-05-19 | End: 2021-05-19

## 2021-05-19 RX ORDER — INSULIN LISPRO 100 [IU]/ML
5 INJECTION, SOLUTION INTRAVENOUS; SUBCUTANEOUS
Qty: 1 VIAL | Refills: 0 | Status: SHIPPED
Start: 2021-05-19 | End: 2021-05-19 | Stop reason: SDUPTHER

## 2021-05-19 RX ORDER — PEN NEEDLE, DIABETIC 30 GX3/16"
NEEDLE, DISPOSABLE MISCELLANEOUS
Qty: 1 PACKAGE | Refills: 11 | Status: SHIPPED
Start: 2021-05-19 | End: 2021-05-19 | Stop reason: SDUPTHER

## 2021-05-19 RX ORDER — BLOOD SUGAR DIAGNOSTIC
STRIP MISCELLANEOUS
Qty: 100 STRIP | Refills: 0 | Status: SHIPPED | OUTPATIENT
Start: 2021-05-19 | End: 2021-05-19

## 2021-05-19 RX ORDER — INSULIN LISPRO 100 [IU]/ML
5 INJECTION, SOLUTION INTRAVENOUS; SUBCUTANEOUS
Qty: 1 VIAL | Refills: 0 | Status: SHIPPED | OUTPATIENT
Start: 2021-05-19 | End: 2021-05-20

## 2021-05-19 RX ORDER — CEPHALEXIN 500 MG/1
500 CAPSULE ORAL 2 TIMES DAILY
Qty: 6 CAPSULE | Refills: 0 | Status: SHIPPED
Start: 2021-05-19 | End: 2021-05-19

## 2021-05-19 RX ORDER — PEN NEEDLE, DIABETIC 30 GX3/16"
NEEDLE, DISPOSABLE MISCELLANEOUS
Qty: 1 PACKAGE | Refills: 11 | Status: SHIPPED | OUTPATIENT
Start: 2021-05-19 | End: 2021-05-19

## 2021-05-19 RX ORDER — LISINOPRIL 20 MG/1
20 TABLET ORAL DAILY
Qty: 30 TABLET | Refills: 0 | Status: SHIPPED
Start: 2021-05-19 | End: 2021-05-19

## 2021-05-19 RX ORDER — LANCETS
EACH MISCELLANEOUS
Qty: 1 EACH | Refills: 11 | Status: SHIPPED | OUTPATIENT
Start: 2021-05-19

## 2021-05-19 RX ORDER — BLOOD-GLUCOSE METER
1 EACH MISCELLANEOUS
Qty: 1 EACH | Refills: 0 | Status: SHIPPED | OUTPATIENT
Start: 2021-05-19 | End: 2021-05-19

## 2021-05-19 RX ORDER — INSULIN LISPRO 100 [IU]/ML
INJECTION, SOLUTION INTRAVENOUS; SUBCUTANEOUS
Qty: 1 PACKAGE | Refills: 0 | Status: SHIPPED
Start: 2021-05-19 | End: 2021-05-19 | Stop reason: SDUPTHER

## 2021-05-19 RX ORDER — INSULIN LISPRO 100 [IU]/ML
5 INJECTION, SOLUTION INTRAVENOUS; SUBCUTANEOUS
Qty: 1 VIAL | Refills: 0 | Status: SHIPPED
Start: 2021-05-19 | End: 2021-05-19

## 2021-05-19 RX ORDER — LANCETS
EACH MISCELLANEOUS
Qty: 1 EACH | Refills: 11 | Status: SHIPPED | OUTPATIENT
Start: 2021-05-19 | End: 2021-05-19

## 2021-05-19 RX ORDER — ENOXAPARIN SODIUM 100 MG/ML
40 INJECTION SUBCUTANEOUS EVERY 24 HOURS
Status: DISCONTINUED | OUTPATIENT
Start: 2021-05-19 | End: 2021-05-19 | Stop reason: HOSPADM

## 2021-05-19 RX ORDER — INSULIN GLARGINE 100 [IU]/ML
40 INJECTION, SOLUTION SUBCUTANEOUS DAILY
Qty: 1 VIAL | Refills: 0 | Status: SHIPPED
Start: 2021-05-19 | End: 2021-05-19

## 2021-05-19 RX ORDER — INSULIN PUMP SYRINGE, 3 ML
EACH MISCELLANEOUS
Qty: 1 KIT | Refills: 0 | Status: SHIPPED | OUTPATIENT
Start: 2021-05-19 | End: 2022-02-08 | Stop reason: SDUPTHER

## 2021-05-19 RX ORDER — LANCETS
EACH MISCELLANEOUS
Qty: 1 EACH | Refills: 11 | Status: SHIPPED
Start: 2021-05-19 | End: 2021-05-19 | Stop reason: SDUPTHER

## 2021-05-19 RX ORDER — INSULIN GLARGINE 100 [IU]/ML
40 INJECTION, SOLUTION SUBCUTANEOUS
Qty: 2 VIAL | Refills: 0 | Status: SHIPPED | OUTPATIENT
Start: 2021-05-19 | End: 2021-05-20

## 2021-05-19 RX ADMIN — PANTOPRAZOLE SODIUM 40 MG: 40 TABLET, DELAYED RELEASE ORAL at 08:26

## 2021-05-19 RX ADMIN — INSULIN LISPRO 5 UNITS: 100 INJECTION, SOLUTION INTRAVENOUS; SUBCUTANEOUS at 12:31

## 2021-05-19 RX ADMIN — LISINOPRIL 20 MG: 20 TABLET ORAL at 10:14

## 2021-05-19 RX ADMIN — INSULIN LISPRO 8 UNITS: 100 INJECTION, SOLUTION INTRAVENOUS; SUBCUTANEOUS at 08:25

## 2021-05-19 RX ADMIN — Medication 10 ML: at 06:35

## 2021-05-19 RX ADMIN — INSULIN LISPRO 5 UNITS: 100 INJECTION, SOLUTION INTRAVENOUS; SUBCUTANEOUS at 08:26

## 2021-05-19 RX ADMIN — ENOXAPARIN SODIUM 40 MG: 40 INJECTION SUBCUTANEOUS at 08:26

## 2021-05-19 RX ADMIN — INSULIN GLARGINE 40 UNITS: 100 INJECTION, SOLUTION SUBCUTANEOUS at 08:24

## 2021-05-19 RX ADMIN — CEPHALEXIN 500 MG: 250 CAPSULE ORAL at 08:26

## 2021-05-19 NOTE — PROGRESS NOTES
5/19/2021  12:06 PM  Medicare pt has received, reviewed, and signed 2nd IM letter informing them of their right to appeal the discharge. Signed copied has been placed on pt bedside chart.   RIK Smart

## 2021-05-19 NOTE — PROGRESS NOTES
[facility] FAMILY MEDICINE RESIDENCY PROGRAM  PROGRESS NOTE     5/19/2021  PCP: Dang Ward,     [facility] Medicine Residency Attending Addendum:  I saw and evaluated the patient on the day of the encounter with Dr. Ganesh Swift, performing the key elements of the service. I discussed the findings, assessment and plan with the resident and agree with the resident's findings and plan as documented in the resident's note. Pending urine Cx. DM education with close follow up needed. Potential DC later today or early tomorrow morning. Junior Cramer MD, REESE Bautista      Assessment/Plan:     Sharon Arango is a 60 y.o. female with a PMHx of DM type II, CKD3B, HTN, HLD, GERD, depression, and gout who is admitted for HHS and UTI. Overnight events: NAEON    Telemetry reviewed: NSR, rate in the 70s-80s. HHS in the setting of type II DM - improving: POA , pH 7.51 (VBG with resp alkalosis), bicarb 27, AG 9, Sosm 299. No ketonuria, however glucose > 1000 on UA. Likely related to underlying UTI and poorly controlled DM. A1c on this admission >14%. Patient is only on home Pioglitazone 30 mg daily at this time. Metformin was stopped by PCP due to borderline GFR. S/p glucostabilizer. - Increase Lantus to 40U daily and add Lispro 5U TID. Watch closely today, if stable discharge in the afternoon with close follow up with PCP.  - Holding home Pioglitazone    - Hypoglycemia protocols ordered  - Diabetes education consulted      Severe sepsis 2/2 UTI: POA LA 2.5 > 1.7. No leukocytosis, fever, CVA tenderness, abdominal pain. UA with + nitrites, small LE,  WBC, no blood, few epi cells, no bacteria but budding yeast noted. S/p Rocephin 2 g IV in the ED.  - Keflex 500mg BID for 5d, last dose 5/22 AM   - BCx NGTD  - UCx pending   - Monitor I/Os      Elevated creatinine superimposed on CKD3B - improving: POA Cr 2.17 (BL ~1.5), trending down. Secondary to IVVD in the setting of HHS.   - Strict I/Os  - Avoid nephrotoxic agents - holding home Lisinopril, HCTZ, allopurinol   - Consider outpatient follow up with nephrology and renal US     HTN: On Prinzide 40-25 mg daily at home.   - Restart Lisinopril at 20mg daily. Discontinue HCTZ d/t Gout. - Will continue to monitor at this time and readjust as BPs trend     HLD: Last lipid panel from December 2020 with total chol 181, , HDL 64, LDL 89.4. On simvastatin 40 mg qhs at home. - Sub home simvastatin for atorvastatin 20 mg qhs while inpatient      GERD: Stable on home Protonix 40 mg daily   - Continue home Protonix      Depression: Stable on home Paxil 30 mg qhs  - Continue home medication      Gout: No recent flared. Takes Allopurinol 300 mg daily.   - HOLD home allopurinol 300 mg daily   - Consider resuming renally dosed allopurinol on discharge      Obesity:  - The pt's Body mass index is 36.33 kg/m². - Encouraging lifestyle modifications and further follow up outpatient. FEN/GI - Diabetic diet. Activity - Ambulate as tolerated  DVT prophylaxis - Lovenox  GI prophylaxis - Protonix  Fall prophylaxis - Not indicated at this time. Disposition - Plan to d/c to Home. PT/OT no needs. Code Status - Full. Discussed with patient / caregivers. Next of Kin Name and Isrrael Pat [address] [place] [address] - [phone]    Funmiliseth Veragh discussed with Dr. Deangelo Azul. Subjective:   Pt was seen and examined at bedside. She is feeling good today and wants to go home. She denies any CP, SOB, dizziness, abdominal pain, nausea, vomiting.      Objective:   Physical examination  Patient Vitals for the past 24 hrs:   Temp Pulse Resp BP SpO2   05/18/21 2344 98.4 °F (36.9 °C) 83 18 136/79 97 %   05/18/21 2013 98 °F (36.7 °C) 82 18 102/72 95 %   05/18/21 1614 98.2 °F (36.8 °C) 82 18 117/71 96 %   05/18/21 1552 97.9 °F (36.6 °C) 83 18 115/61 93 %   05/18/21 1500 -- 78 -- -- --   05/18/21 1400 -- 77 26 (!) 102/51 97 %   05/18/21 1300 -- 81 18 (!) 94/49 96 %   05/18/21 1230 -- 74 22 (!) 99/53 93 %   21 1200 98.5 °F (36.9 °C) 76 24 (!) 123/49 95 %   21 1100 -- -- -- 105/67 --   21 1048 97.8 °F (36.6 °C) 75 20 (!) 104/54 95 %   21 1000 -- 70 21 (!) 92/46 94 %   21 0930 -- -- -- (!) 104/54 --   21 0900 -- 80 22 (!) 102/52 95 %   21 0800 -- 79 21 116/61 95 %      Temp (24hrs), Av.1 °F (36.7 °C), Min:97.8 °F (36.6 °C), Max:98.5 °F (36.9 °C)         O2 Device: None (Room air)    Date 21 - 21 07 - 21 0659   Shift 9309-3870 3565-7791 24 Hour Total 9695-0994 9029-5399 24 Hour Total   INTAKE   P.O. 360  360        P. O. 360  360      I. V.(mL/kg/hr) 1339(1.1)  1339(0.6)        I.V. 0  0        Insulin Volume 9.8  9.8        Volume (0.45% sodium chloride infusion) 0  0        Volume (dextrose 5 % - 0.45% NaCl infusion) 0  0        Volume (sodium chloride 0.9 % bolus infusion 1,000 mL) 0  0        Volume (dextrose 5% - 0.45% NaCl with KCl 20 mEq/L infusion) 790  790        Volume (0.9% sodium chloride infusion) 539.2  539.2        Volume (cefTRIAXone (ROCEPHIN) 1 g in sterile water (preservative free) 10 mL IV syringe) 0  0        Volume (cefepime (MAXIPIME) 2 g in sterile water (preservative free) 10 mL IV syringe) 0  0      Shift Total(mL/kg) 3301(39.5)  5482(55.2)      OUTPUT   Urine(mL/kg/hr) 800(0.7)  800(0.3)        Urine Voided 350  350        Urine Output (mL) ([REMOVED] External Urinary Catheter 21) 450  450      Shift Total(mL/kg) 800(8.2)  800(8.2)        899      Weight (kg) 97.1 97.1 97.1 97.1 97.1 97.1     General:   Alert, cooperative, no acute distress   Head:   Atraumatic   Eyes:   Conjunctivae clear   ENT:  Oral mucosa normal   Neck:  Supple   Lungs:   Clear to auscultation bilaterally, no wheezing/rales/rhonchi     Heart:   Normal rate, regular rhythm, no murmur   Abdomen:    Soft, non-tender, non-distended    Extremities:  No edema   Pulses:  Symmetric all extremities   Skin:  Warm and dry Neurologic:  Alert and oriented, no focal deficits   Urinary catheter:  Pure-wick in place     Data Review:     CBC:  Recent Labs     05/19/21  0445 05/18/21  0546 05/17/21  1836   WBC 6.2 7.7 7.5   HGB 10.3* 9.6* 12.1   HCT 33.9* 31.3* 38.6   * 500* 064     Metabolic Panel:  Recent Labs     05/19/21  0445 05/18/21  0546 05/18/21  0153 05/17/21  1836    137 139 128*   K 4.0 3.9 3.5 4.7    106 105 92*   CO2 29 28 30 27   BUN 22* 25* 27* 30*   CREA 1.47* 1.54* 1.67* 2.17*   * 250* 90 823*   CA 9.0 8.5 9.7 9.6   MG 1.8  --  1.8 2.0   PHOS  --   --   --  3.1   ALB 3.0* 2.8*  --  3.9   TBILI 0.3 0.2  --  0.4   ALT 29 26  --  38       Imaging:  No results found. Medications reviewed  Current Facility-Administered Medications   Medication Dose Route Frequency    dextrose (D50W) injection syrg 12.5-25 g  25-50 mL IntraVENous PRN    insulin glargine (LANTUS) injection 35 Units  0.4 Units/kg SubCUTAneous DAILY    insulin lispro (HUMALOG) injection   SubCUTAneous AC&HS    cephALEXin (KEFLEX) capsule 500 mg  500 mg Oral BID    sodium chloride (NS) flush 5-40 mL  5-40 mL IntraVENous Q8H    sodium chloride (NS) flush 5-40 mL  5-40 mL IntraVENous PRN    acetaminophen (TYLENOL) tablet 650 mg  650 mg Oral Q6H PRN    Or    acetaminophen (TYLENOL) suppository 650 mg  650 mg Rectal Q6H PRN    polyethylene glycol (MIRALAX) packet 17 g  17 g Oral DAILY PRN    heparin (porcine) injection 5,000 Units  5,000 Units SubCUTAneous Q8H    pantoprazole (PROTONIX) tablet 40 mg  40 mg Oral ACB    PARoxetine (PAXIL) tablet 30 mg  30 mg Oral QHS    atorvastatin (LIPITOR) tablet 20 mg  20 mg Oral QHS    [Held by provider] lisinopriL (PRINIVIL, ZESTRIL) tablet 40 mg  40 mg Oral DAILY    [Held by provider] hydroCHLOROthiazide (HYDRODIURIL) tablet 25 mg  25 mg Oral DAILY         Signed:   Lucho Garza DO  Family Medicine Resident      Attending note: Attending note to follow. ..

## 2021-05-19 NOTE — PROGRESS NOTES
5/19/2021  11:46 AM  Pt discussed in IDR rounds, pt is stable for DC today, will need Diabetic Management to see her, also pricing for insulin. Transitions of Care Plan:  RUR 14% Green Low risk of Readmission  LOS 2 Days  1. Family Practice to send Rx for insulin to pt's [address], pt can contact pharmacy for her [facility]. CM spoke w/ Dr Earl Arriaza, insulin requires pre-auth, he is working with pharmacist to find best pricing through her insurance  2. Pt confirmed son will transport her home today  3. [facility] resources  4. Outpatient f/u PCP, scheduled by [facility] and added to AVS  Care Management Interventions  PCP Verified by CM: Yes ([facility])  Palliative Care Criteria Met (RRAT>21 & CHF Dx)?: No  Mode of Transport at Discharge:  Other (see comment) (family, kids)  Transition of Care Consult (CM Consult): Discharge Planning  MyChart Signup: No  Discharge Durable Medical Equipment: No  Physical Therapy Consult: Yes  Occupational Therapy Consult: Yes  Speech Therapy Consult: No  Current Support Network: Lives with Spouse  Confirm Follow Up Transport: Family  The Plan for Transition of Care is Related to the Following Treatment Goals : hyperglycemia  Discharge Location  Discharge Placement: Home with outpatient services  RIK Jules

## 2021-05-19 NOTE — DISCHARGE SUMMARY
[address] [facility] [address] [place] [address]   Office [phone]  Fax [phone]       Discharge / Transfer / Off-Service Note     Name: Jeffrey Avendaño MRN: [id number]  Sex: Female   YOB: 1955  Age: 60 y.o. PCP: Lorie Bond DO     Date of admission: 5/17/2021  Date of discharge/transfer: 5/19/2021    Attending physician at admission: Randall Walker MD     Attending physician at discharge/transfer: Dr. Lyndsay Dubon    Resident physician at discharge/transfer: Regan Vu MD     Consultants during hospitalization  None     Admission diagnoses   Type 2 diabetes mellitus with hyperosmolar hyperglycemic state (HHS) ([facility] [E11.00, E11.65]  UTI (urinary tract infection) [N39.0]    Recommended follow-up after discharge  1. PCP: Lorie Bond,     Things to follow up on with PCP:  Diabetes Management  HTN Management    HOSPITAL COURSE  Jeffrey Avendaño is a 60 y.o. female with a PMHx of DM type II, CKD3B, HTN, HLD, GERD, depression, and gout who is admitted for HHS and UTI. POA , pH 7.51 (VBG with resp alkalosis), bicarb 27, AG 9, Sosm 299. No ketonuria, however glucose > 1000 on UA. Likely 2/2 UTI treated with CTX and transitioned to Keflex 500mg BID 5d total. HHS treated with glucostabilizer transitioned to [facility] Lantus and discharged on Lantus 40U daily and Novalog 5U TID. Hospital course c/b lactic acidosis, resolved with fluids and treatment. HHS in the setting of type II DM:  2/2 UTI. HbA1c >14 (previously 7.0 12/20). PIoglitazone 30mg daily at home. Metformin was stopped by PCP due to borderline GFR. S/p glucostabilizer. - Lantus to 40U daily and Lispro 5U TID, adjust as required  - Holding home Pioglitazone       Severe sepsis 2/2 UTI: POA LA 2.5 > 1.7. UA with + nitrites, small LE,  WBC, no blood, few epi cells, no bacteria but budding yeast noted.    - Keflex 500mg BID for 5d, last dose 5/22 AM   - BCx NGTD, f/u final  - UCx with 50,000 colonies, f/u final     Elevated creatinine superimposed on CKD3B: POA Cr 2.17 (BL ~1.5), back to baseline on discharge. 2/2 IVVD in the setting of HHS. - Discontinued HCTZ d/t gout   - Consider outpatient f/u with nephrology and renal US     HTN: On Prinzide 40-25 mg daily at home. - Lisinopril at 20mg daily, adjust as required. Discontinued HCTZ d/t Gout. HLD: Last lipid panel from December 2020 with total chol 181, , HDL 64, LDL 89.4. On simvastatin 40 mg qhs at home. GERD: Stable on home Protonix 40 mg daily      Depression: Stable on home Paxil 30 mg qhs    Gout: Allopurinol 300 mg daily. Obesity:  - The pt's Body mass index is 36.33 kg/m². - Encourage lifestyle modifications and further follow up outpatient. Physical exam at discharge:    Vitals Reviewed.   Visit Vitals  /77 (BP 1 Location: Left upper arm, BP Patient Position: At rest)   Pulse 88   Temp 97.9 °F (36.6 °C)   Resp 18   Ht 5' 2\" (1.575 m)   Wt 214 lb 1.6 oz (97.1 kg)   SpO2 97%   Breastfeeding No   BMI 39.16 kg/m²          General:   Alert, cooperative, no acute distress   Head:   Atraumatic   Eyes:   Conjunctivae clear   ENT:  Oral mucosa normal   Neck:  Supple   Lungs:   Clear to auscultation bilaterally, no wheezing/rales/rhonchi     Heart:   Normal rate, regular rhythm, no murmur   Abdomen:    Soft, non-tender, non-distended    Extremities:  No edema   Pulses:  Symmetric all extremities   Skin:  Warm and dry   Neurologic:  Alert and oriented, no focal deficits   Urinary catheter:  Pure-wick in place     Condition at discharge: Stable    Labs  Recent Labs     05/19/21  0445 05/18/21  0546 05/17/21  1836   WBC 6.2 7.7 7.5   HGB 10.3* 9.6* 12.1   HCT 33.9* 31.3* 38.6   * 141* 202     Recent Labs     05/19/21  0445 05/18/21  0546 05/18/21  0153 05/17/21  1836    137 139 128*   K 4.0 3.9 3.5 4.7    106 105 92*   CO2 29 28 30 27   BUN 22* 25* 27* 30*   CREA 1.47* 1.54* 1.67* 2.17*   * 250* 90 823*   CA 9.0 8.5 9.7 9.6   MG 1.8  --  1.8 2.0   PHOS  --   --   --  3.1     Recent Labs     05/19/21  0445 05/18/21  0546 05/17/21  1836   ALT 29 26 38    97 138*   TBILI 0.3 0.2 0.4   TP 6.4 5.8* 7.1   ALB 3.0* 2.8* 3.9   GLOB 3.4 3.0 3.2     Recent Labs     05/19/21  1114 05/19/21  0805 05/18/21 2057 05/18/21  1637 05/18/21  1104   GLUCPOC 275* 386* 338* 338* 285*       Microbiology  Results       Procedure Component Value Units Date/Time    CULTURE, BLOOD [[id number]] Collected: 05/17/21 2111    Order Status: Completed Specimen: Blood Updated: 05/19/21 0511     Special Requests: NO SPECIAL REQUESTS        Culture result: NO GROWTH 2 DAYS       CULTURE, URINE [[id number]] Collected: 05/17/21 2030    Order Status: Canceled Specimen: Clean catch     URINE CULTURE HOLD SAMPLE [[id number]] Collected: 05/17/21 1837    Order Status: Completed Specimen: Urine from Serum Updated: 05/17/21 1855     Urine culture hold       Urine on hold in Microbiology dept for 2 days. If unpreserved urine is submitted, it cannot be used for addtional testing after 24 hours, recollection will be required. CULTURE, URINE [[id number]] Collected: 05/17/21 1837    Order Status: Completed Specimen: Urine from Clean catch Updated: 05/19/21 0821     Special Requests: NO SPECIAL REQUESTS        Albert Count --        81423  COLONIES/mL       Culture result:       CULTURE IN PROGRESS,FURTHER UPDATES TO FOLLOW                 Procedures / Diagnostic Studies  Echo Results  (Last 48 hours)      None           Imaging  No results found.      Chronic diagnoses   Problem List as of 5/19/2021 Date Reviewed: 3/30/2021          Codes Class Noted - Resolved    Stage 3b chronic kidney disease ([facility] ICD-10-CM: X12.63  ICD-9-CM: 585.3  5/19/2021 - Present        UTI (urinary tract infection) ICD-10-CM: N39.0  ICD-9-CM: 599.0  5/17/2021 - Present        * (Principal) Type 2 diabetes mellitus with hyperosmolar hyperglycemic state (HHS) ([facility] ICD-10-CM: E11.00, E11.65  ICD-9-CM: 250.22  5/17/2021 - Present        Personal history of gout ICD-10-CM: Z87.39  ICD-9-CM: V12.29  10/9/2015 - Present        Depression ICD-10-CM: F32.9  ICD-9-CM: 860  10/9/2015 - Present        DM type 2 (diabetes mellitus, type 2) ([facility] ICD-10-CM: E11.9  ICD-9-CM: 250.00  2/12/2015 - Present        HTN (hypertension) ICD-10-CM: I10  ICD-9-CM: 401.9  2/12/2015 - Present        Hypercholesterolemia ICD-10-CM: E78.00  ICD-9-CM: 272.0  2/12/2015 - Present        Psoriatic arthritis ([facility] ICD-10-CM: L40.50  ICD-9-CM: 696.0  2/12/2015 - Present        Psoriasis ICD-10-CM: L40.9  ICD-9-CM: 696.1  2/12/2015 - Present        GERD (gastroesophageal reflux disease) ICD-10-CM: K21.9  ICD-9-CM: 530.81  2/12/2015 - Present        RESOLVED: Hyperglycemia due to type 2 diabetes mellitus ([facility] ICD-10-CM: E11.65  ICD-9-CM: 250.00  10/9/2015 - 9/21/2018        RESOLVED: UTI (lower urinary tract infection) ICD-10-CM: N39.0  ICD-9-CM: 599.0  10/9/2015 - 5/19/2021        RESOLVED: Acute kidney injury [facility]) ICD-10-CM: N17.9  ICD-9-CM: 584.9  10/9/2015 - 9/21/2018                Discharge/Transfer Medications  Discharge Medication List as of 5/19/2021  3:59 PM        START taking these medications    Details   Blood-Glucose Meter monitoring kit Dispense 1 Blood Glucose Meter Kit, Normal, Disp-1 Kit, R-0      Insulin Syringes, Disposable, 1 mL syrg Use to inject insulin as prescribed., Normal, Disp-100 Syringe, R-0           CONTINUE these medications which have CHANGED    Details   lancets misc Dispense 100 Lancets, Normal, Disp-1 Each, R-11      glucose blood VI test strips (blood glucose test) strip Dispense 100 Glucose Test Strips, Normal, Disp-100 Strip, R-0      insulin glargine (LANTUS) 100 unit/mL injection 40 Units by SubCUTAneous route nightly for 50 days. , Normal, Disp-2 Vial, R-0      lisinopriL (PRINIVIL, ZESTRIL) 20 mg tablet Take 1 Tablet by mouth daily. , Normal, Disp-30 Tablet, R-0      cephALEXin (KEFLEX) 500 mg capsule Take 1 Capsule by mouth two (2) times a day., Normal, Disp-6 Capsule, R-0      insulin lispro (HUMALOG) 100 unit/mL injection 5 Units by SubCUTAneous route Before breakfast, lunch, and dinner for 67 days. , Normal, Disp-1 Vial, R-0           CONTINUE these medications which have NOT CHANGED    Details   lidocaine (Lidoderm) 5 % 1 Patch by TransDERmal route daily as needed. Apply patch to the affected area for 12 hours a day and remove for 12 hours a day., Historical Med      fexofenadine (ALLEGRA) 180 mg tablet Take 180 mg by mouth daily as needed for Allergies. , Historical Med      PARoxetine (PAXIL) 30 mg tablet Take 1 tablet by mouth once daily, Normal, Disp-90 Tab, R-4      allopurinoL (ZYLOPRIM) 300 mg tablet Take 1 tablet by mouth once daily, Normal, Disp-90 Tab, R-0      simvastatin (ZOCOR) 40 mg tablet Take 1 tablet by mouth nightly, Normal, Disp-90 Tab,R-3      pantoprazole (PROTONIX) 40 mg tablet Take 1 tablet by mouth once daily, Normal, Disp-90 Tab,R-3      ALPRAZolam (XANAX) 0.25 mg tablet Take 0.25 mg by mouth as needed for Anxiety. , Historical Med      acetaminophen (TYLENOL) 325 mg tablet Take  by mouth every four (4) hours as needed for Pain., Historical Med           STOP taking these medications       insulin lispro (HUMALOG) 100 unit/mL kwikpen Comments:   Reason for Stopping:         Blood-Glucose Meter (Easy-Touch Blood Glucose Meter) misc Comments:   Reason for Stopping:         insulin glargine U-300 conc (TOUJEO) 300 unit/mL (1.5 mL) inpn pen Comments:   Reason for Stopping:         Insulin Needles, Disposable, 31 gauge x 5/16\" ndle Comments:   Reason for Stopping:         lisinopril-hydroCHLOROthiazide (PRINZIDE, ZESTORETIC) 20-12.5 mg per tablet Comments:   Reason for Stopping:         pioglitazone (ACTOS) 30 mg tablet Comments:   Reason for Stopping:                Diet:  Diabetic diet.     Activity:  As tolerated    Disposition: Home with family care    Discharge instructions to patient/family  Please seek medical attention for any new or worsening symptoms particularly fever, chest pain, shortness of breath, abdominal pain, nausea, vomiting    Follow up plans/appointments  Follow-up Information       Follow up With Specialties Details Why Contact Info    Genesis Sheppard MD Family Medicine On 5/24/2021 Hospital Follow Appointment with Dr. Digna Borja on 5/24/2021 at 2:55pm [address] [facility]  [phone]      [facility] [address] [facility], [address] [facility] [address]  [address] [address] [id number]      [facility] Call for outpatient diabetes education [address] Joselin Laguerre MD  Family Medicine Resident     For Billing    Chief Complaint   Patient presents with    High Blood Sugar       Hospital Problems  Date Reviewed: 3/30/2021          Codes Class Noted POA    Stage 3b chronic kidney disease ([facility] ICD-10-CM: N18.32  ICD-9-CM: 585.3  5/19/2021 Unknown        UTI (urinary tract infection) ICD-10-CM: N39.0  ICD-9-CM: 599.0  5/17/2021 Unknown        * (Principal) Type 2 diabetes mellitus with hyperosmolar hyperglycemic state (HHS) [facility]) ICD-10-CM: E11.00, E11.65  ICD-9-CM: 250.22  5/17/2021 Unknown        Personal history of gout ICD-10-CM: Z87.39  ICD-9-CM: V12.29  10/9/2015 Yes        Depression ICD-10-CM: F32.9  ICD-9-CM: 119  10/9/2015 Yes        DM type 2 (diabetes mellitus, type 2) ([facility] ICD-10-CM: E11.9  ICD-9-CM: 250.00  2/12/2015 Yes        HTN (hypertension) ICD-10-CM: I10  ICD-9-CM: 401.9  2/12/2015 Yes        Hypercholesterolemia ICD-10-CM: E78.00  ICD-9-CM: 272.0  2/12/2015 Yes        GERD (gastroesophageal reflux disease) ICD-10-CM: K21.9  ICD-9-CM: 530.81  2/12/2015 Yes                 [address] Residency Attending Addendum:  I saw and evaluated the patient on the day of the encounter with Dr. Barrie Kendrick, performing the key elements of the service. I discussed the findings, assessment and plan with the resident and agree with the resident's findings and plan as documented in the resident's note.       Angel West MD, FAAFP, CAQSM, RMSK

## 2021-05-19 NOTE — DIABETES MGMT
[facility] SPECIALIST CONSULT NOTE    Presentation   Emma Mixon is a 60 y.o. female admitted 5/17/21 with elevated blood glucose, increased thirst and increased urination over the past week. Found to be in [facility] in ED by labs 5/17/21 at 1836 with  and AG of 9. HX:   Past Medical History:   Diagnosis Date    Arthritis     Diabetes ([address]     Gout     Hernia, hiatal     Hypertension        Current clinical course has been uncomplicated. Diabetes: Patient has known Type 2 diabetes, diagnosed approximately 6 years ago. Home diabetes medication regimen is Actos 30 mg daily. Admission  and A1c >14% indicate poor diabetes control. Consulted by Provider for advanced diabetes nursing assessment and care, specifically related to     [x] Survival skill education - insulin and meter instruction    Diabetes-related medical history  Acute complications  HHNK  Neurological complications  denies  Microvascular disease  Nephropathy  Macrovascular disease  denies  Other associated conditions     denies    Diabetes medication history  Drug class Currently in use Discontinued Never used   Biguanide  Metformin discontinued by PCP in November/ December 2020 due to kidney function    DPP-4 inhibitor       Sulfonylurea      Thiazolidinedione Actos 30 mg daily     GLP-1 RA      SGLT-2 inhibitors      Basal insulin  Toujeo - used at time of initial diagnosis. PCP discontinued 6/2017 due to patient had good control of BG at that time    Bolus insulin      Fixed Dose  Combinations        Subjective   Patient sitting up in bed, alert and oriented. Patient without complaints at this time. Patient reports the following home diabetes self-care practices:  Eating pattern  Patient reports that she typically only eats dinner each day. Patient reports she will occasionally have breakfast or lunch.   Reports \"sometimes I have a little muffin mid morning but not always. \"  Patient reports she tries to do a hot meal with meat/vegetable/starch each night. Patient reports she drinks water, soda and juice. Reports \"sometimes it's diet\"  Patient also reports \"I know what I am supposed to do and how I am supposed to eat, it is just a matter of getting back in the good habits again. \"    Physical activity pattern  \"I watch a 1year old every day so I have no specific routine other than chasing her. \"    Monitoring pattern  Patient reports \"for the last week I have been checking my sugars twice a day and they have all been high, prior to that I wasn't usually checking them. \"  Taking medications pattern  [x] Consistent administration  [x] Affordable    Social determinants of health impacting diabetes self-management practices   Patient denies any issues or concerns at this time. Objective   Physical exam  General Alert, oriented and in no acute distress. Conversant and cooperative. Vital Signs   Visit Vitals  /77 (BP 1 Location: Left upper arm, BP Patient Position: At rest)   Pulse 88   Temp 97.9 °F (36.6 °C)   Resp 18   Ht 5' 2\" (1.575 m)   Wt 97.1 kg (214 lb 1.6 oz)   SpO2 97%   Breastfeeding No   BMI 39.16 kg/m²     Skin  Warm and dry. No Acanthosis noted along neckline. No lipohypertrophy or lipoatrophy noted at injection sites   Heart   Regular rate and rhythm.  No murmurs, rubs or gallops  Lungs  Clear to auscultation without rales or rhonchi  Extremities No foot wounds         Laboratory  Lab Results   Component Value Date/Time    Hemoglobin A1c >14.0 (H) 05/18/2021 01:53 AM    Hemoglobin A1c (POC) 9.7 10/29/2019 04:00 PM     Lab Results   Component Value Date/Time    LDL, calculated 89.4 12/11/2020 07:44 AM     Lab Results   Component Value Date/Time    Creatinine 1.47 (H) 05/19/2021 04:45 AM     Lab Results   Component Value Date/Time    Sodium 136 05/19/2021 04:45 AM    Potassium 4.0 05/19/2021 04:45 AM    Chloride 105 05/19/2021 04:45 AM    CO2 29 05/19/2021 04:45 AM    Anion gap 2 (L) 05/19/2021 04:45 AM    Glucose 296 (H) 05/19/2021 04:45 AM    BUN 22 (H) 05/19/2021 04:45 AM    Creatinine 1.47 (H) 05/19/2021 04:45 AM    BUN/Creatinine ratio 15 05/19/2021 04:45 AM    GFR est AA 43 (L) 05/19/2021 04:45 AM    GFR est non-AA 36 (L) 05/19/2021 04:45 AM    Calcium 9.0 05/19/2021 04:45 AM    Bilirubin, total 0.3 05/19/2021 04:45 AM    Alk. phosphatase 100 05/19/2021 04:45 AM    Protein, total 6.4 05/19/2021 04:45 AM    Albumin 3.0 (L) 05/19/2021 04:45 AM    Globulin 3.4 05/19/2021 04:45 AM    A-G Ratio 0.9 (L) 05/19/2021 04:45 AM    ALT (SGPT) 29 05/19/2021 04:45 AM     Lab Results   Component Value Date/Time    ALT (SGPT) 29 05/19/2021 04:45 AM         Factors affecting BG management  Factor Dose Comments   Nutrition:  Carb-controlled meals   60 grams/meal    Drugs:  Vasopressor load  Steroids  HIV   Epogen  Blood transfusion(s)  Other: n/a   n/a  n/a  n/a  n/a  n/a         A1cs inaccurate  A1cs inaccurate   Pain 0/10    Infection Keflex UTI   Other: n/a n/a      Blood glucose pattern          Evaluation   This 60year old female, with Type 2 diabetes, did not achieve diabetes control prior to admission (PTA), as evidenced by admission BG of 823 and A1c of >14%. Based on assessment of diabetes self-care practices, interventions that warrant action while hospitalized include:   [x] Healthy Eating   [x] Problem Solving  [x] Being Active   [x] Healthy Coping  [x] Monitoring   [x] Reducing Risks  [x] Taking Medications    The patient would also benefit from diabetes self-management education and support [facility]) after discharge. During this hospitalization, the patient has not achieved inpatient blood glucose target of 100-180mg/dl. BG's have ranged 275-386 over the past 24 hours. Factors that have played a role include:    [x]  Kidney dysfunction      Insulin drip with glucostabilizer was started 5/17/21 at 2139, insulin drip discontinued 5/18/210 at 0756.   Drip discontinued 2 hours after basal insulin dose given at 0615 5/18/21 (Lantus 35 units given at that time)    Basal insulin is in use. Lantus 40 units daily currently ordered (increased from 35 units yesterday). Bolus insulin is in use. Humalog 5 units with meals currently ordered. Patient is eating meals. Corrective insulin is in use. Patient has required 24 units of corrective insulin over the past 24 hours. Patient reports that she has given herself insulin in the past when she was initially diagnosed (she was taking Toujeo at that time). Asked patient to provide demonstration on insulin administration using training insulin pen and fake abdomen. Patient was able to explain where to give insulin and accurately gave demonstration of injecting insulin using training pen and fake abdomen. To optimize BG control and support a positive health outcome, would utilize the Subcutaneous Insulin Order set (8675). Impression: It is suspected that the current basal/bolus/corrective insulin regimen will be sufficient to obtain and maintain glycemic control. It is likely that patient will require insulin at discharge as well given her A1c of >14% on admission. Assessment and Plan   Nursing Diagnosis Risk for unstable blood glucose pattern   Nursing Intervention Domain 2013 Decision-making Support   Nursing Interventions Examined current inpatient diabetes control   Explored factors facilitating and impeding inpatient management  Identified self-management practices impeding diabetes control  Explored corrective strategies with patient and responsible inpatient provider   Informed patient of rational for insulin strategy while hospitalized       Recommendations   Recommend:    [x] Use of Subcutaneous Insulin Order set (1935)  Insulin Use Recommendation   Basal                                      (Based on weight, BMI & GFR) Addresses basic metabolic needs Continue Lantus 40 units daily. Nutritional                                      (Based on CHO load) Addresses nutrition interventions Continue Humalog 5 units with meals, hold if patient eats < 50% CHO on meal tray. Corrective                                       (Offset gaps in dosing) Useful in adjusting insulin dosing Correctional Scale for Normal Sensitivity    200-249- 2units Humalog  [phone] Humalog  [phone] Humalog  [phone] Humalog  Over 400- 10units Humalog    Do NOT hold for NPO; give in addition to meal time insulin dose. If patient does not eat, -give correction dose only. [x] Discharge Recommendations  1. Check BG before meals and at bedtime, keep a log to share with PCP. 2.  Lantus 40 units daily    3. Humalog 5 units with meals. 4. Follow up with PCP in a week    [x] Referral to    [x] Diabetes Self-Management Training through [facility] (Phone [phone] to schedule appointment)    Billing Code(s)   Total time spent with patient: 35 Minutes. I personally reviewed medical record, including notes, data and current medications, and examined the patient at bedside before making care recommendations (circumstances permitting).        [x] [id number]  subsequent hospital care - 35 minutes    Lita Phoenix, DNP, APRN-BC, [address] [facility], [facility]  Diabetes Clinical Nurse Specialist  [facility]  Access via [address] [place] [address]

## 2021-05-19 NOTE — PROGRESS NOTES
Discharge medications reviewed with patient and appropriate educational materials and side effects teaching were provided. I have reviewed discharge instructions with the patient. The patient verbalized understanding. IV removed. AVS signed by patient. Copy of AVS provided to patient to take home. Patient's son to transport patient home.

## 2021-05-19 NOTE — DISCHARGE INSTRUCTIONS
HOME DISCHARGE INSTRUCTIONS    Jeffrey Avendaño / [id number] : 1955    Admission date: 2021 Discharge date: 2021     Please bring this form with you to show your care provider at your follow-up appointment. Primary care provider:  Lorie Bond DO    Discharging provider:  Regan Vu MD  - Family Medicine Resident  Dr. Lyndsay Dubon - Attending, Family Medicine     You have been admitted to the hospital with the following diagnoses:    ACUTE DIAGNOSES:  · Type 2 diabetes mellitus with hyperosmolar hyperglycemic state (HHS) ([facility] [address] [E11.00, E11.65]  · UTI (urinary tract infection) [N39.0]  . . . . . . . . . . . . . . . . . . . . . . . . . . . . . . . . . . . . . . . . . . . . . . . . . . . . . . . . . . . . . . . . . . . . . . . Fausto Whitmore FOLLOW-UP CARE RECOMMENDATIONS:  You are well enough to be discharged from the hospital. However, because you were staying in a hospital, you are at greater risk of having been exposed to the coronavirus. PLEASE stay inside and quarantine for 14 days to prevent further spread of the coronavirus. Appointments  Follow-up Information     Follow up With Specialties Details Why Contact Info    Maria D Espinoza MD Family Medicine On 2021 Hospital Follow Appointment with Dr. Elissa Stein on 2021 at 2:55pm [address] [place] [address]  [phone]           Medication Changes:    Start:  1. Lisinopril 20mg daily for blood pressure  2. Keflex 500mg twice daily for 6 more doses, start TONIGHT for UTI  3. Insulin Glargine (Toujeo) 40 units nightly, start tomorrow night  Insulin Lispro 5 units three times daily with meals    Stop - Lisinopril-HCTZ (Prinzide) [Should not be on a fluid pill with Gout - Avoid Hydrochlorothiazide]  Pioglitazone      Please follow up with your PCP regarding:  - Diabetes Management  - Blood Pressure Management    Follow-up tests needed: none    Pending test results:    At the time of your discharge the following test results are still pending: final urine culture and blood culture. Please make sure you review these results with your outpatient follow-up provider(s). Specific symptoms to watch for: chest pain, shortness of breath, fever, chills, nausea, vomiting, diarrhea, change in mentation, falling, weakness, bleeding. DIET/what to eat:  Diabetic Diet    ACTIVITY:  Activity as tolerated    Wound care: none    Equipment needed:  none    What to do if new or unexpected symptoms occur? If you experience any of the above symptoms (or should other concerns or questions arise after discharge) please call your primary care physician. Return to the emergency room if you cannot get hold of your doctor. · It is very important that you keep your follow-up appointment(s). · Please bring discharge papers, medication list (and/or medication bottles) to your follow-up appointments for review by your outpatient provider(s). · Please check the list of medications and be sure it includes every medication (even non-prescription medications) that your provider wants you to take. · It is important that you take the medication exactly as they are prescribed. · Keep your medication in the bottles provided by the pharmacist and keep a list of the medication names, dosages, and times to be taken in your wallet. · Do not take other medications without consulting your doctor. · If you have any questions about your medications or other instructions, please talk to your nurse or care provider before you leave the hospital.     Information obtained by:     I understand that if any problems occur once I am at home I am to contact my physician. These instructions were explained to me and I had the opportunity to ask questions. I understand and acknowledge receipt of the instructions indicated above.                                                                                                                                                Physician's or R.N.'s Signature                                                                  Date/Time                                                                                                                                              Patient or Representative Signature                                                          Date/Time

## 2021-05-19 NOTE — ADT AUTH CERT NOTES
Comments Comment Last edited by  on  at Patient Demographics Patient Name Loris Klinefelter [address]  
[id number] Legal Sex Female   
1955 Address [address] Phone [phone] (Home) [phone] (Mobile) *Preferred* Patient Demographics Patient Name Loris Klinefelter [address]  
[id number] Legal Sex Female   
1955 Address [address] Phone [phone] (Home) [phone] (Mobile) *Preferred*  
CSN:  
[id number] Admit Date: Admit Time Room Bed May 17, 2021  6:28 PM Alethea [[id number]] 01 Gabino Wu Attending Providers Provider Pager From To Rafat Boyle MD  21 Troy Michaels MD  21 Emergency Contact(s) Name Relation Home Work Mobile Sheeba Rosen   [phone] Utilization Reviews 
 
  
Diabetes - Care Day 3 (2021) by Daniella Negrete RN 
 
  
Review Entered Review Status 2021 15:06 Completed  
  
Criteria Review Care Day: 3 Care Date: 2021 Level of Care: Step Down Guideline Day 2 Level Of Care (X) Floor Clinical Status   
(X) * Hypotension absent 2021 15:06:01 EDT by Brenodn Fu 97.9 P 88 /77, 130/79 R 18 
96% RA   
(X) * Mental status at baseline   
(X) * Acidosis absent or improved   
(X) * Blood glucose under acceptable control or improved 2021 15:06:01 EDT by Tien Rosales   
  POC Glucose 386, 275   
(X) * Dehydration absent   
(X) * Electrolyte abnormalities absent or improved   
(X) * Renal function at baseline or improved Activity   
(X) * Ambulatory 2021 15:06:01 EDT by Brendon Fu PT prior datePt with improved blood glucose and has transitioned to sub q insulin. She offers good participation and tolerates in-room mobility with supervision to SBA this morning. BP low but stable and she denies presyncopal symptoms.    
Routes   
(X) * Oral hydration, and medications 5/19/2021 15:06:01 EDT by Lucas Carlin   
  keflex po bid   
(X) [place] diet, advance as tolerated 5/19/2021 15:06:01 EDT by Lucas Carlin   
  diabetic diet Interventions (X) Complete IV volume, replacement,   
(X) Serum glucose, serum ketones, chemistries, ABGs or venous pH measurements, urinalysis 5/19/2021 15:06:01 EDT by Lucas Carlin   
  Hgb 10.3Anion gap 2  BUN 22 Crea 1.47 Urine cx: [place] [id number] COLONIES/mL   
Preliminary UA Hazy; Nitrites +, Leuk Est SM, WBC  Blood Cx: NO GROWTH 2 DAYS   
(X) Bedside glucose monitoring (X) Diabetic education Medications   
(X) * IV insulin absent 5/19/2021 15:06:01 EDT by Lucas Carlin   
  Regular Insulin IV ggt Start: 05/17/21 2015 End: 05/18/21 0803   
(X) * Outpatient diabetic medication regimen initiated (X) Subcutaneous insulin 5/19/2021 15:06:01 EDT by Lucas Carlin   
  Insulin lantus 40 units sq daily Insulin lispro ac/hs sq per CS & 5 unist tid w/meals * Milestone Additional Notes MEDICINE PROGRESS NOTE Assessment/Plan:  
   
Den Blanco a 60 y. o. female with a PMHx of DM type II, CKD3B, HTN, HLD, GERD, depression, and gout who is admitted for HHS and UTI.  
   
Overnight events: NAEON  
   
Telemetry reviewed: NSR, rate in the 70s-80s.   
   
HHS in the setting of type II DM - improving: POA , pH 7.51 (VBG with resp alkalosis), bicarb 27, AG 9, Sosm 299. No ketonuria, however glucose > 1000 on UA. Likely related to underlying UTI and poorly controlled DM. A1c on this admission >14%. Patient is only on home Pioglitazone 30 mg daily at this time. Metformin was stopped by PCP due to borderline GFR.  S/p glucostabilizer.  
- Increase Lantus to 40U daily and add Lispro 5U TID.  Watch closely today, if stable discharge in the afternoon with close follow up with PCP.  
- Holding home Pioglitazone    
- Hypoglycemia protocols ordered  
- Diabetes education consulted   
   
Severe sepsis 2/2 UTI: POA LA 2.5 > 1.7. No leukocytosis, fever, CVA tenderness, abdominal pain. UA with + nitrites, small LE,  WBC, no blood, few epi cells, no bacteria but budding yeast noted. S/p Rocephin 2 g IV in the ED. - Keflex 500mg BID for 5d, last dose 5/22 AM   
- BCx NGTD  
- UCx pending - Monitor I/Os   
   
Elevated creatinine superimposed on CKD3B - improving: POA Cr 2.17 (BL ~1.5), trending down. Secondary to IVVD in the setting of HHS. - Strict I/Os  
- Avoid nephrotoxic agents - holding home Lisinopril, HCTZ, allopurinol   
- Consider outpatient follow up with nephrology and renal US  
   
HTN: On Prinzide 40-25 mg daily at home.   
- Restart Lisinopril at 20mg daily. Discontinue HCTZ d/t Gout.   
- Will continue to monitor at this time and readjust as BPs trend  
   
HLD: Last lipid panel from December 2020 with total chol 181, , HDL 64, LDL 89.4. On simvastatin 40 mg qhs at home.   
- Sub home simvastatin for atorvastatin 20 mg qhs while inpatient   
   
GERD: Stable on home Protonix 40 mg daily   
- Continue home Protonix   
   
Depression: Stable on home Paxil 30 mg qhs  
- Continue home medication   
   
Gout: No recent flared. Takes Allopurinol 300 mg daily.   
- HOLD home allopurinol 300 mg daily   
- Consider resuming renally dosed allopurinol on discharge   
   
Obesity: - The pt's Body mass index is 36.33 kg/m². - Encouraging lifestyle modifications and further follow up outpatient.  
   
FEN/GI - Diabetic diet. Activity - Ambulate as tolerated DVT prophylaxis - Lovenox GI prophylaxis - Protonix Fall prophylaxis - Not indicated at this time. Disposition - Plan to d/c to Home. PT/OT no needs. Code Status - Full. Discussed with patient / caregivers.  
   
Subjective: Pt was seen and examined at bedside. She is feeling good today and wants to go home.  She denies any CP, SOB, dizziness, abdominal pain, nausea, vomiting.   
  
  
Diabetes - Care Day 2 (5/18/2021) by Cortez Acosta RN 
 
  
Review Entered Review Status 5/19/2021 14:54 Completed  
  
Criteria Review Care Day: 2 Care Date: 5/18/2021 Level of Care: Step Down Guideline Day 2 Level Of Care (X) Floor 5/19/2021 14:53:28 EDT by vinod Spencer to Medical   
Clinical Status   
(X) * Hypotension absent 5/19/2021 14:53:28 EDT by Leola Herrera 98.5 P 66 - 90 BP 88/52, 98/63, 92/46 R 22, 27, 23, 20, 26, 18 
93-97% RA (X) * Mental status at baseline 5/19/2021 14:53:28 EDT by Dov Banks   
  Neurologic: Alert and oriented, no focal deficits   
(X) * Acidosis absent or improved ( ) * Blood glucose under acceptable control or improved 5/19/2021 14:53:28 EDT by Dov Banks   
  CO2 30, 28 
GLU 90, 250   
( ) * Dehydration absent   
(X) * Electrolyte abnormalities absent or improved   
(X) * Renal function at baseline or improved 5/19/2021 14:53:28 EDT by Leola Herrera improved Activity ( ) * Ambulatory 5/19/2021 14:53:28 EDT by Leola Herrera activity as clifford w/assist 
Cardiac monitoring Routes   
(X) * Oral hydration, and medications 5/19/2021 14:53:28 EDT by Dov Banks   
  NS IV 1000 ml bolus @ 0300, NS IV @ 50 ml/h stopped @ 1800 Cefepime 2 g IV once Vancomycin 1250 mg IV once Cephalexin 500 mg po bid Valentino-synephrine IV ggt titrate ordered @ 0500, d/c'd @ 2170 prior to initiation   
(X) [place] diet, advance as tolerated 5/19/2021 14:53:28 EDT by Dov Banks   
  diabetic diet Interventions (X) Complete IV volume, replacement,   
(X) Serum glucose, serum ketones, chemistries, ABGs or venous pH measurements, urinalysis 5/19/2021 14:53:28 EDT by Leola Herrera BUN 27, 25 Crea 1.67, 1.54 Lactic acid 2.5, 1.7 HgA1C >14.0 (X) Bedside glucose monitoring 5/19/2021 14:53:28 EDT by Leola Herrera changed to q4h   
(X) Diabetic education Medications ( ) * IV insulin absent 5/19/2021 14:53:28 EDT by Joppa Fall   
  Regular insulin IV ggt stopped @ 0700   
(X) * Outpatient diabetic medication regimen initiated (X) Subcutaneous insulin 5/19/2021 14:53:28 EDT by Joppa Fall   
  Lantus 35 units sq daily Lispro sq ac/hs per CS   
* Milestone Additional Notes MEDICINE PROGRESS NOTE Attending Addendum:   
  
Transition to subcu insulin.  Diabetic education today.  Continued continue to monitor creatinine.  Improved today.  Potential discharge tomorrow Assessment/Plan:   
  
60 y. o. female with a PMHx of DM type II, CKD3B, HTN, HLD, GERD, depression, and gout who is admitted for HHS and UTI.  
   
Overnight events: BP low with MAPs in the low to mid 60s. Responded to NS bolus.   
   
Telemetry reviewed: NSR, rate in the 70s-80s.    
   
HHS in the setting of type II DM - improving: POA , pH 7.51 (VBG with resp alkalosis), bicarb 27, AG 9. No ketonuria, however glucose > 1000 on UA. Likely related to underlying UTI and poorly controlled DM. Last A1c 7% in December 2020. Patient is only on home Pioglitazone 30 mg daily at this time. Metformin was stopped by PCP due to borderline GFR.    
- Start SQ insulin at this time - Lantus 35 U daily (0.4U/kg) -Louise Rios per protocol for an additional 2 hours - Start diabetic diet   
- POC glucose q1h, BMP and mag q4h  
- Continue D5 0.45% NS + 20 KCl at 200 mL/hour.   
- Serum osmolality and A1c pending   
- Holding home Pioglitazone    
- Hypoglycemia protocols ordered  
- Diabetes education consulted   
   
Severe sepsis 2/2 UTI: POA LA 2.5 > 1.7. No leukocytosis, fever, CVA tenderness, abdominal pain. UA with + nitrites, small LE,  WBC, no blood, few epi cells, no bacteria but budding yeast noted.   
- S/p Rocephin 2 g IV in the ED. - Will broaden ABX to IV Cefepime and Vanc to cover for pseudomonas and enterococcus - BCx and UCx pending - Monitor I/Os   
   
Elevated creatinine superimposed on CKD3B - improving: POA Cr 2.17 (BL ~1.5), trending down. Secondary to IVVD in the setting of HHS. - Continue IVF as above - Strict I/Os  
- Avoid nephrotoxic agents - holding home Lisinopril, HCTZ, allopurinol   
- Consider outpatient follow up with nephrology and renal US  
   
HTN: On Prinzide 40-25 mg daily at home.   
- HOLD home medications of Lisinopril andHCTZ due to elevated Cr and hypotension   
- Will continue to monitor at this time and readjust as BPs trend  
   
HLD: Last lipid panel from December 2020 with total chol 181, , HDL 64, LDL 89.4. On simvastatin 40 mg qhs at home.   
- Sub home simvastatin for atorvastatin 20 mg qhs while inpatient   
   
GERD: Stable on home Protonix 40 mg daily   
- Continue home Protonix   
   
Depression: Stable on home Paxil 30 mg qhs  
- Continue home medication   
   
Gout: No recent flared. Takes Allopurinol 300 mg daily.   
- HOLD home allopurinol 300 mg daily   
- Consider resuming renally dosed allopurinol on discharge   
   
Obesity: - The pt's Body mass index is 36.33 kg/m². - Encouraging lifestyle modifications and further follow up outpatient.  
   
   
FEN/GI - Diabetic diet. D5 0.45% NS + 20 KCl at 200 mL/hour. Activity - Ambulate as tolerated DVT prophylaxis - Sub Q Heparin GI prophylaxis - Protonix Fall prophylaxis - Not indicated at this time. Disposition - Plan to d/c to Home. Consulting PT, OT and CM Code Status - Full. Discussed with patient / caregivers.  
   
Subjective: Pt was seen and examined at bedside. She reports a mild headache but states that she is otherwise feeling about the same. She denies any CP, SOB, dizziness, abdominal pain, nausea, vomiting.

## 2021-05-20 ENCOUNTER — PATIENT OUTREACH (OUTPATIENT)
Dept: CASE MANAGEMENT | Age: 66
End: 2021-05-20

## 2021-05-20 DIAGNOSIS — E11.65 TYPE 2 DIABETES MELLITUS WITH HYPERGLYCEMIA, WITH LONG-TERM CURRENT USE OF INSULIN (HCC): Primary | ICD-10-CM

## 2021-05-20 DIAGNOSIS — Z79.4 TYPE 2 DIABETES MELLITUS WITH HYPERGLYCEMIA, WITH LONG-TERM CURRENT USE OF INSULIN (HCC): Primary | ICD-10-CM

## 2021-05-20 LAB
BACTERIA SPEC CULT: ABNORMAL
CC UR VC: ABNORMAL
SERVICE CMNT-IMP: ABNORMAL

## 2021-05-20 RX ORDER — INSULIN GLARGINE 100 [IU]/ML
40 INJECTION, SOLUTION SUBCUTANEOUS DAILY
Qty: 5 PEN | Refills: 5 | Status: SHIPPED | OUTPATIENT
Start: 2021-05-20 | End: 2022-04-03

## 2021-05-20 RX ORDER — PEN NEEDLE, DIABETIC 31 GX3/16"
NEEDLE, DISPOSABLE MISCELLANEOUS
Qty: 200 PEN NEEDLE | Refills: 11 | Status: SHIPPED | OUTPATIENT
Start: 2021-05-20 | End: 2022-01-13 | Stop reason: SDUPTHER

## 2021-05-20 RX ORDER — INSULIN ASPART 100 [IU]/ML
INJECTION, SOLUTION INTRAVENOUS; SUBCUTANEOUS
Qty: 5 PEN | Refills: 11 | Status: SHIPPED | OUTPATIENT
Start: 2021-05-20 | End: 2021-05-26

## 2021-05-20 NOTE — PROGRESS NOTES
Care Transitions Outreach Attempt    Call within 2 business days of discharge: Yes   Attempted to reach patient for transitions of care follow up. Unable to reach patient. Left VM asking for return call. Left second VM- relayed  at 2:55 appt with Dr. Joie Shi. Patient: Kristy Julian Patient : 1955 MRN: [id number]    Last Discharge [facility] Facility       Complaint Diagnosis Description Type Department Provider    21 High Blood Sugar Hyperosmolar hyperglycemic state (HHS) ([facility] [address] . .. ED to Hosp-Admission (Discharged) (ADMIT) [id number] Vernell Herrera MD; Miracle Montanez,... Was this an external facility discharge? No       Noted following upcoming appointments from discharge chart review:   [facility] follow up appointment(s):   Future Appointments   Date Time Provider Parish Howard   2021  2:55 PM Zulma Moscoso MD [facility]     Non-[facility] follow up appointment(s):  To be assessed

## 2021-05-23 LAB
BACTERIA SPEC CULT: NORMAL
SERVICE CMNT-IMP: NORMAL

## 2021-05-24 ENCOUNTER — PATIENT OUTREACH (OUTPATIENT)
Dept: CASE MANAGEMENT | Age: 66
End: 2021-05-24

## 2021-05-24 NOTE — PROGRESS NOTES
CTN has not been able to reach patient after x 3 attempts. CTN resolving post discharge episode. CTN will not make further attempts to reach.      Future Appointments   Date Time Provider Parish Howard   5/26/2021  4:00 PM Analisa Reddy, DO [facility]

## 2021-05-25 RX ORDER — LISINOPRIL 20 MG/1
TABLET ORAL
Qty: 90 TABLET | Refills: 4 | Status: SHIPPED | OUTPATIENT
Start: 2021-05-25 | End: 2021-05-25 | Stop reason: SDUPTHER

## 2021-05-25 RX ORDER — LISINOPRIL 20 MG/1
TABLET ORAL
Qty: 90 TABLET | Refills: 4 | Status: SHIPPED | OUTPATIENT
Start: 2021-05-25 | End: 2021-06-14

## 2021-05-26 ENCOUNTER — OFFICE VISIT (OUTPATIENT)
Dept: FAMILY MEDICINE CLINIC | Age: 66
End: 2021-05-26
Payer: MEDICARE

## 2021-05-26 VITALS
BODY MASS INDEX: 37.73 KG/M2 | TEMPERATURE: 98.2 F | HEART RATE: 83 BPM | OXYGEN SATURATION: 97 % | RESPIRATION RATE: 16 BRPM | SYSTOLIC BLOOD PRESSURE: 114 MMHG | HEIGHT: 62 IN | WEIGHT: 205 LBS | DIASTOLIC BLOOD PRESSURE: 78 MMHG

## 2021-05-26 DIAGNOSIS — Z79.4 TYPE 2 DIABETES MELLITUS WITH HYPERGLYCEMIA, WITH LONG-TERM CURRENT USE OF INSULIN (HCC): ICD-10-CM

## 2021-05-26 DIAGNOSIS — L40.50 PSORIATIC ARTHRITIS (HCC): ICD-10-CM

## 2021-05-26 DIAGNOSIS — N30.00 ACUTE CYSTITIS WITHOUT HEMATURIA: ICD-10-CM

## 2021-05-26 DIAGNOSIS — E11.00 TYPE 2 DIABETES MELLITUS WITH HYPEROSMOLAR HYPERGLYCEMIC STATE (HHS) (HCC): Primary | ICD-10-CM

## 2021-05-26 DIAGNOSIS — E11.65 TYPE 2 DIABETES MELLITUS WITH HYPERGLYCEMIA, WITH LONG-TERM CURRENT USE OF INSULIN (HCC): ICD-10-CM

## 2021-05-26 LAB — GLUCOSE POC: 236 MG/DL

## 2021-05-26 PROCEDURE — 82962 GLUCOSE BLOOD TEST: CPT | Performed by: FAMILY MEDICINE

## 2021-05-26 PROCEDURE — G8427 DOCREV CUR MEDS BY ELIG CLIN: HCPCS | Performed by: FAMILY MEDICINE

## 2021-05-26 PROCEDURE — 99495 TRANSJ CARE MGMT MOD F2F 14D: CPT | Performed by: FAMILY MEDICINE

## 2021-05-26 RX ORDER — INSULIN ASPART 100 [IU]/ML
INJECTION, SOLUTION INTRAVENOUS; SUBCUTANEOUS
Qty: 5 PEN | Refills: 11 | Status: SHIPPED | OUTPATIENT
Start: 2021-05-26 | End: 2022-04-29

## 2021-05-26 NOTE — PATIENT INSTRUCTIONS
A Healthy Lifestyle: Care Instructions Your Care Instructions A healthy lifestyle can help you feel good, stay at a healthy weight, and have plenty of energy for both work and play. A healthy lifestyle is something you can share with your whole family. A healthy lifestyle also can lower your risk for serious health problems, such as high blood pressure, heart disease, and diabetes. You can follow a few steps listed below to improve your health and the health of your family. Follow-up care is a key part of your treatment and safety. Be sure to make and go to all appointments, and call your doctor if you are having problems. It's also a good idea to know your test results and keep a list of the medicines you take. How can you care for yourself at home? · Do not eat too much sugar, fat, or fast foods. You can still have dessert and treats now and then. The goal is moderation. · Start small to improve your eating habits. Pay attention to portion sizes, drink less juice and soda pop, and eat more fruits and vegetables. ? Eat a healthy amount of food. A 3-ounce serving of meat, for example, is about the size of a deck of cards. Fill the rest of your plate with vegetables and whole grains. ? Limit the amount of soda and sports drinks you have every day. Drink more water when you are thirsty. ? Eat plenty of fruits and vegetables every day. Have an apple or some carrot sticks as an afternoon snack instead of a candy bar. Try to have fruits and/or vegetables at every meal. 
· Make exercise part of your daily routine. You may want to start with simple activities, such as walking, bicycling, or slow swimming. Try to be active 30 to 60 minutes every day. You do not need to do all 30 to 60 minutes all at once. For example, you can exercise 3 times a day for 10 or 20 minutes.  Moderate exercise is safe for most people, but it is always a good idea to talk to your doctor before starting an exercise program. 
· Keep moving. Joseph Clause the lawn, work in the garden, or [employer]. Take the stairs instead of the elevator at work. · If you smoke, quit. People who smoke have an increased risk for heart attack, stroke, cancer, and other lung illnesses. Quitting is hard, but there are ways to boost your chance of quitting tobacco for good. ? Use nicotine gum, patches, or lozenges. ? Ask your doctor about stop-smoking programs and medicines. ? Keep trying. In addition to reducing your risk of diseases in the future, you will notice some benefits soon after you stop using tobacco. If you have shortness of breath or asthma symptoms, they will likely get better within a few weeks after you quit. · Limit how much alcohol you drink. Moderate amounts of alcohol (up to 2 drinks a day for men, 1 drink a day for women) are okay. But drinking too much can lead to liver problems, high blood pressure, and other health problems. Family health If you have a family, there are many things you can do together to improve your health. · Eat meals together as a family as often as possible. · Eat healthy foods. This includes fruits, vegetables, lean meats and dairy, and whole grains. · Include your family in your fitness plan. Most people think of activities such as jogging or tennis as the way to fitness, but there are many ways you and your family can be more active. Anything that makes you breathe hard and gets your heart pumping is exercise. Here are some tips: 
? Walk to do errands or to take your child to school or the bus. 
? Go for a family bike ride after dinner instead of watching TV. Where can you learn more? Go to http://www.gray.com/ Enter D349 in the search box to learn more about \"A Healthy Lifestyle: Care Instructions. \" Current as of: September 23, 2020               Content Version: 12.8 © 8770-2348 [employer].   
Care instructions adapted under license by [employer] (which disclaims liability or warranty for this information). If you have questions about a medical condition or this instruction, always ask your healthcare professional. [address] any warranty or liability for your use of this information.

## 2021-05-26 NOTE — PROGRESS NOTES
Chief Complaint   Patient presents with   [facility] Follow Up     Patient presents in office today for ANGELA f/u from [facility]. Admitted on 5/17/21 for hyperglycemia   Discharge on 5/19/21. No concerns. 1. Have you been to the ER, urgent care clinic since your last visit? Hospitalized since your last visit? Yes When: 5/17/21 Where: [facility] Reason for visit: hyperglymecia    2. Have you seen or consulted any other health care providers outside of the [address] [place] [address] since your last visit? Include any pap smears or colon screening.  No    Learning Assessment 2/9/2016   PRIMARY LEARNER Patient   HIGHEST LEVEL OF EDUCATION - PRIMARY LEARNER  GRADUATED HIGH SCHOOL OR GED   BARRIERS PRIMARY LEARNER NONE     -   CO-LEARNER CAREGIVER No   PRIMARY LANGUAGE ENGLISH   LEARNER PREFERENCE PRIMARY DEMONSTRATION   ANSWERED BY pt   RELATIONSHIP SELF

## 2021-05-26 NOTE — PROGRESS NOTES
Progress Note    she is a 60y.o. year old female who presents for evalution. Subjective:     Pt here for f/u from recent hyperosmolar hyperglycemic state. She states she was not doing what she was supposed to, drinking soda eating whatever she wanted. Her A1C was 7 now >14. States she felt she was getting sick. She is taking novolog 5 unitsbefore meals. 40 lantus at night. Glucose was 104 and felt shaky. Disc that her body is now used to being elevated glucose. Glucose 2 hrs after a meal in 200's. Finished tx for uti Monday thinks it is still there, still having urgency. No blood. Reviewed PmHx, RxHx, FmHx, SocHx, AllgHx and updated and dated in the chart. Review of Systems - negative except as listed above in the HPI    Objective:     Vitals:    05/26/21 1609   BP: 114/78   Pulse: 83   Resp: 16   Temp: 98.2 °F (36.8 °C)   TempSrc: Oral   SpO2: 97%   Weight: 205 lb (93 kg)   Height: 5' 2\" (1.575 m)       Current Outpatient Medications   Medication Sig    insulin aspart U-100 (NOVOLOG) 100 unit/mL (3 mL) inpn 7 Units by SubCUTAneous route Before breakfast, lunch, and dinner max daily 21u    lisinopriL (PRINIVIL, ZESTRIL) 20 mg tablet Take 1 tablet by mouth once daily    insulin glargine (LANTUS,BASAGLAR) 100 unit/mL (3 mL) inpn 40 Units by SubCUTAneous route daily.  Insulin Needles, Disposable, (Aislinn Pen Needle) 32 gauge x 5/32\" ndle 4 shot daily    Blood-Glucose Meter monitoring kit Dispense 1 Blood Glucose Meter Kit    lancets misc Dispense 100 Lancets    glucose blood VI test strips (blood glucose test) strip Dispense 100 Glucose Test Strips    Insulin Syringes, Disposable, 1 mL syrg Use to inject insulin as prescribed.  fexofenadine (ALLEGRA) 180 mg tablet Take 180 mg by mouth daily as needed for Allergies.     PARoxetine (PAXIL) 30 mg tablet Take 1 tablet by mouth once daily    allopurinoL (ZYLOPRIM) 300 mg tablet Take 1 tablet by mouth once daily    simvastatin (ZOCOR) 40 mg tablet Take 1 tablet by mouth nightly    pantoprazole (PROTONIX) 40 mg tablet Take 1 tablet by mouth once daily    acetaminophen (TYLENOL) 325 mg tablet Take  by mouth every four (4) hours as needed for Pain.  ALPRAZolam (XANAX) 0.25 mg tablet Take 0.25 mg by mouth as needed for Anxiety.  lidocaine (Lidoderm) 5 % 1 Patch by TransDERmal route daily as needed. Apply patch to the affected area for 12 hours a day and remove for 12 hours a day. (Patient not taking: Reported on 5/26/2021)     No current facility-administered medications for this visit. Physical Examination: General appearance - alert, well appearing, and in no distress  Mental status - alert, oriented to person, place, and time      Assessment/ Plan:   Diagnoses and all orders for this visit:    1. Type 2 diabetes mellitus with hyperosmolar hyperglycemic state (HHS) ([facility])  -     AMB POC GLUCOSE BLOOD, BY GLUCOSE MONITORING DEVICE  Increase novolog to 7u before meals. 2. Psoriatic arthritis ([facility] [address]  Pt will f/u with her rheumatologist  3. Acute cystitis without hematuria  -     CULTURE, URINE; Future    4. Type 2 diabetes mellitus with hyperglycemia, with long-term current use of insulin ([facility])  -     insulin aspart U-100 (NOVOLOG) 100 unit/mL (3 mL) inpn; 7 Units by SubCUTAneous route Before breakfast, lunch, and dinner max daily 21u  If staying in 200's go up to 9 after weekend. She will send me glucose logs in a week. Follow-up and Dispositions    · Return in about 3 months (around 8/26/2021), or if symptoms worsen or fail to improve. I have discussed the diagnosis with the patient and the intended plan as seen in the above orders. The patient has received an after-visit summary and questions were answered concerning future plans. Pt conveyed understanding of plan.     Medication Side Effects and Warnings were discussed with patient    An electronic signature was used to authenticate this note  [employer], DO

## 2021-05-28 LAB
BACTERIA UR CULT: NORMAL
SPECIMEN STATUS REPORT, ROLRST: NORMAL

## 2021-06-01 NOTE — PROGRESS NOTES
There is a very small amount of bacteria in your urine this is usually considered clinically insignificant and more of contamination. Please let me know how your symptoms are.

## 2021-06-02 RX ORDER — NITROFURANTOIN 25; 75 MG/1; MG/1
100 CAPSULE ORAL 2 TIMES DAILY
Qty: 10 CAPSULE | Refills: 0 | Status: SHIPPED | OUTPATIENT
Start: 2021-06-02 | End: 2021-06-07

## 2021-06-14 ENCOUNTER — DOCUMENTATION ONLY (OUTPATIENT)
Dept: FAMILY MEDICINE CLINIC | Age: 66
End: 2021-06-14

## 2021-06-14 RX ORDER — LISINOPRIL 20 MG/1
TABLET ORAL
Qty: 30 TABLET | Refills: 0 | Status: SHIPPED | OUTPATIENT
Start: 2021-06-14 | End: 2021-07-06 | Stop reason: SDUPTHER

## 2021-06-16 ENCOUNTER — TELEPHONE (OUTPATIENT)
Dept: FAMILY MEDICINE CLINIC | Age: 66
End: 2021-06-16

## 2021-06-16 NOTE — TELEPHONE ENCOUNTER
Received RC from patient. She states that she has done diabetes education in the past so she doesn't think that she will need it. She states that she knows how to manage her diabetes, she just had neglected it up until her recent hospital admit.

## 2021-06-16 NOTE — TELEPHONE ENCOUNTER
Called and LVM for patient to call the office back. When she was admitted to [facility], the hospitalist sent a request to the [facility] for them the make an apt with her for diabetes education. If patient is interested in this then Dr. Marcia Goss will sign off on the referral that was faxed to us. Just need to know what she would like to do.

## 2021-06-24 RX ORDER — BLOOD SUGAR DIAGNOSTIC
STRIP MISCELLANEOUS
Qty: 100 STRIP | Refills: 0 | Status: SHIPPED | OUTPATIENT
Start: 2021-06-24 | End: 2021-12-19

## 2021-07-06 RX ORDER — LISINOPRIL 20 MG/1
20 TABLET ORAL DAILY
Qty: 90 TABLET | Refills: 4 | Status: SHIPPED | OUTPATIENT
Start: 2021-07-06 | End: 2022-08-16

## 2021-07-12 DIAGNOSIS — F32.A DEPRESSION, UNSPECIFIED DEPRESSION TYPE: ICD-10-CM

## 2021-07-13 RX ORDER — PAROXETINE 30 MG/1
30 TABLET, FILM COATED ORAL DAILY
Qty: 90 TABLET | Refills: 4 | Status: SHIPPED | OUTPATIENT
Start: 2021-07-13 | End: 2022-08-23

## 2021-07-27 RX ORDER — ALLOPURINOL 300 MG/1
TABLET ORAL
Qty: 90 TABLET | Refills: 0 | Status: SHIPPED | OUTPATIENT
Start: 2021-07-27 | End: 2021-11-02

## 2021-11-02 RX ORDER — ALLOPURINOL 300 MG/1
TABLET ORAL
Qty: 90 TABLET | Refills: 0 | Status: SHIPPED | OUTPATIENT
Start: 2021-11-02 | End: 2022-02-08

## 2021-11-09 RX ORDER — PANTOPRAZOLE SODIUM 40 MG/1
TABLET, DELAYED RELEASE ORAL
Qty: 90 TABLET | Refills: 0 | Status: SHIPPED | OUTPATIENT
Start: 2021-11-09 | End: 2022-02-15

## 2021-12-15 DIAGNOSIS — E78.00 HYPERCHOLESTEROLEMIA: ICD-10-CM

## 2021-12-15 RX ORDER — SIMVASTATIN 40 MG/1
TABLET, FILM COATED ORAL
Qty: 90 TABLET | Refills: 0 | Status: SHIPPED | OUTPATIENT
Start: 2021-12-15 | End: 2022-03-15

## 2021-12-19 RX ORDER — BLOOD SUGAR DIAGNOSTIC
STRIP MISCELLANEOUS SEE ADMIN INSTRUCTIONS
Qty: 100 STRIP | Refills: 0 | Status: SHIPPED | OUTPATIENT
Start: 2021-12-19 | End: 2022-06-14

## 2022-01-05 ENCOUNTER — VIRTUAL VISIT (OUTPATIENT)
Dept: FAMILY MEDICINE CLINIC | Age: 67
End: 2022-01-05
Payer: MEDICARE

## 2022-01-05 ENCOUNTER — TELEPHONE (OUTPATIENT)
Dept: FAMILY MEDICINE CLINIC | Age: 67
End: 2022-01-05

## 2022-01-05 DIAGNOSIS — U07.1 COVID-19: Primary | ICD-10-CM

## 2022-01-05 PROCEDURE — 99214 OFFICE O/P EST MOD 30 MIN: CPT | Performed by: FAMILY MEDICINE

## 2022-01-05 PROCEDURE — 1101F PT FALLS ASSESS-DOCD LE1/YR: CPT | Performed by: FAMILY MEDICINE

## 2022-01-05 PROCEDURE — G8400 PT W/DXA NO RESULTS DOC: HCPCS | Performed by: FAMILY MEDICINE

## 2022-01-05 PROCEDURE — G8417 CALC BMI ABV UP PARAM F/U: HCPCS | Performed by: FAMILY MEDICINE

## 2022-01-05 PROCEDURE — G8427 DOCREV CUR MEDS BY ELIG CLIN: HCPCS | Performed by: FAMILY MEDICINE

## 2022-01-05 PROCEDURE — 1090F PRES/ABSN URINE INCON ASSESS: CPT | Performed by: FAMILY MEDICINE

## 2022-01-05 PROCEDURE — G8536 NO DOC ELDER MAL SCRN: HCPCS | Performed by: FAMILY MEDICINE

## 2022-01-05 PROCEDURE — G8756 NO BP MEASURE DOC: HCPCS | Performed by: FAMILY MEDICINE

## 2022-01-05 PROCEDURE — G9717 DOC PT DX DEP/BP F/U NT REQ: HCPCS | Performed by: FAMILY MEDICINE

## 2022-01-05 PROCEDURE — 3017F COLORECTAL CA SCREEN DOC REV: CPT | Performed by: FAMILY MEDICINE

## 2022-01-05 PROCEDURE — G9899 SCRN MAM PERF RSLTS DOC: HCPCS | Performed by: FAMILY MEDICINE

## 2022-01-05 NOTE — TELEPHONE ENCOUNTER
Called and spoke with patient. Advised that RX was called into [facility] in [place]. Patient verbalized understanding.

## 2022-01-05 NOTE — PATIENT INSTRUCTIONS
A Healthy Lifestyle: Care Instructions  Your Care Instructions     A healthy lifestyle can help you feel good, stay at a healthy weight, and have plenty of energy for both work and play. A healthy lifestyle is something you can share with your whole family. A healthy lifestyle also can lower your risk for serious health problems, such as high blood pressure, heart disease, and diabetes. You can follow a few steps listed below to improve your health and the health of your family. Follow-up care is a key part of your treatment and safety. Be sure to make and go to all appointments, and call your doctor if you are having problems. It's also a good idea to know your test results and keep a list of the medicines you take. How can you care for yourself at home? · Do not eat too much sugar, fat, or fast foods. You can still have dessert and treats now and then. The goal is moderation. · Start small to improve your eating habits. Pay attention to portion sizes, drink less juice and soda pop, and eat more fruits and vegetables. ? Eat a healthy amount of food. A 3-ounce serving of meat, for example, is about the size of a deck of cards. Fill the rest of your plate with vegetables and whole grains. ? Limit the amount of soda and sports drinks you have every day. Drink more water when you are thirsty. ? Eat plenty of fruits and vegetables every day. Have an apple or some carrot sticks as an afternoon snack instead of a candy bar. Try to have fruits and/or vegetables at every meal.  · Make exercise part of your daily routine. You may want to start with simple activities, such as walking, bicycling, or slow swimming. Try to be active 30 to 60 minutes every day. You do not need to do all 30 to 60 minutes all at once. For example, you can exercise 3 times a day for 10 or 20 minutes.  Moderate exercise is safe for most people, but it is always a good idea to talk to your doctor before starting an exercise program.  · Keep moving. [place] the lawn, work in the garden, or [employer]. Take the stairs instead of the elevator at work. · If you smoke, quit. People who smoke have an increased risk for heart attack, stroke, cancer, and other lung illnesses. Quitting is hard, but there are ways to boost your chance of quitting tobacco for good. ? Use nicotine gum, patches, or lozenges. ? Ask your doctor about stop-smoking programs and medicines. ? Keep trying. In addition to reducing your risk of diseases in the future, you will notice some benefits soon after you stop using tobacco. If you have shortness of breath or asthma symptoms, they will likely get better within a few weeks after you quit. · Limit how much alcohol you drink. Moderate amounts of alcohol (up to 2 drinks a day for men, 1 drink a day for women) are okay. But drinking too much can lead to liver problems, high blood pressure, and other health problems. Family health  If you have a family, there are many things you can do together to improve your health. · Eat meals together as a family as often as possible. · Eat healthy foods. This includes fruits, vegetables, lean meats and dairy, and whole grains. · Include your family in your fitness plan. Most people think of activities such as jogging or tennis as the way to fitness, but there are many ways you and your family can be more active. Anything that makes you breathe hard and gets your heart pumping is exercise. Here are some tips:  ? Walk to do errands or to take your child to school or the bus.  ? Go for a family bike ride after dinner instead of watching TV. Where can you learn more? Go to http://www.gray.com/  Enter J900 in the search box to learn more about \"A Healthy Lifestyle: Care Instructions. \"  Current as of: June 16, 2021               Content Version: 13.0  © 9767-9053 Healthwise, Incorporated.    Care instructions adapted under license by [employer] (which disclaims liability or warranty for this information). If you have questions about a medical condition or this instruction, always ask your healthcare professional. [address] any warranty or liability for your use of this information.

## 2022-01-05 NOTE — PROGRESS NOTES
Progress Note    she is a 73y.o. year old female who presents for evalution. 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 Pt woke up Sunday night Monday morning with coughing and sneezing.  + body aches and muscle aches. Did not take temp. Maybe slight sob. + loss of taste and smell. No NVD.  @ home rapid covid was positive. She has received 2 Covid vaccines    Reviewed PmHx, RxHx, FmHx, SocHx, AllgHx and updated and dated in the chart. Review of Systems - negative except as listed above in the HPI    Objective: There were no vitals filed for this visit. Current Outpatient Medications   Medication Sig    OTHER Nirmatrelvir 300 mg with ritonavir 100 mg, administered together, twice daily for 5 days    glucose blood VI test strips (Accu-Chek Guide test strips) strip by Does Not Apply route See Admin Instructions. Use 1 strip 2 to 3 times daily    simvastatin (ZOCOR) 40 mg tablet Take 1 tablet by mouth nightly    pantoprazole (PROTONIX) 40 mg tablet Take 1 tablet by mouth once daily    allopurinoL (ZYLOPRIM) 300 mg tablet Take 1 tablet by mouth once daily    PARoxetine (PAXIL) 30 mg tablet Take 1 Tablet by mouth daily.  lisinopriL (PRINIVIL, ZESTRIL) 20 mg tablet Take 1 Tablet by mouth daily.  insulin aspart U-100 (NOVOLOG) 100 unit/mL (3 mL) inpn 7 Units by SubCUTAneous route Before breakfast, lunch, and dinner max daily 21u    insulin glargine (LANTUS,BASAGLAR) 100 unit/mL (3 mL) inpn 40 Units by SubCUTAneous route daily.  Insulin Needles, Disposable, (Aislinn Pen Needle) 32 gauge x 5/32\" ndle 4 shot daily    Blood-Glucose Meter monitoring kit Dispense 1 Blood Glucose Meter Kit    lancets misc Dispense 100 Lancets    Insulin Syringes, Disposable, 1 mL syrg Use to inject insulin as prescribed.  lidocaine (Lidoderm) 5 % 1 Patch by TransDERmal route daily as needed. Apply patch to the affected area for 12 hours a day and remove for 12 hours a day. (Patient not taking: Reported on 5/26/2021)    fexofenadine (ALLEGRA) 180 mg tablet Take 180 mg by mouth daily as needed for Allergies.  acetaminophen (TYLENOL) 325 mg tablet Take  by mouth every four (4) hours as needed for Pain.  ALPRAZolam (XANAX) 0.25 mg tablet Take 0.25 mg by mouth as needed for Anxiety. No current facility-administered medications for this visit. Physical Examination: General appearance - alert, well appearing, and in no distress  Chest -normal respiratory effort on exam  Assessment/ Plan:   Diagnoses and all orders for this visit:    1. COVID-19  -     OTHER; Nirmatrelvir 300 mg with ritonavir 100 mg, administered together, twice daily for 5 days       Follow-up and Dispositions    · Return if symptoms worsen or fail to improve. I have discussed the diagnosis with the patient and the intended plan as seen in the above orders. The patient has received an after-visit summary and questions were answered concerning future plans. Pt conveyed understanding of plan. Medication Side Effects and Warnings were discussed with patient      Isela Reasoner is being evaluated by a Virtual Visit (video visit) encounter to address concerns as mentioned above. A caregiver was present when appropriate. Due to this being a TeleHealth encounter (During WXIIN-04 public health emergency), evaluation of the following organ systems was limited: Vitals/Constitutional/EENT/Resp/CV/GI//MS/Neuro/Skin/Heme-Lymph-Imm. Pursuant to the emergency declaration under the [facility], [address] [place] [address] authority and the [employer] and [employer] Act, this Virtual Visit was conducted with patient's (and/or legal guardian's) consent, to reduce the patient's risk of exposure to COVID-19 and provide necessary medical care.   The patient (and/or legal guardian) has also been advised to contact this office for worsening conditions or problems, and seek emergency medical treatment and/or call 911 if deemed necessary. Patient identification was verified at the start of the visit: Yes    Services were provided through a video synchronous discussion virtually to substitute for in-person clinic visit. Patient and provider were located at their individual homes.   --Megan Morales,  on 1/5/2022 at 2:37 PM

## 2022-01-13 DIAGNOSIS — E11.65 TYPE 2 DIABETES MELLITUS WITH HYPERGLYCEMIA, WITH LONG-TERM CURRENT USE OF INSULIN (HCC): ICD-10-CM

## 2022-01-13 DIAGNOSIS — Z79.4 TYPE 2 DIABETES MELLITUS WITH HYPERGLYCEMIA, WITH LONG-TERM CURRENT USE OF INSULIN (HCC): ICD-10-CM

## 2022-01-13 RX ORDER — PEN NEEDLE, DIABETIC 31 GX3/16"
NEEDLE, DISPOSABLE MISCELLANEOUS
Qty: 200 PEN NEEDLE | Refills: 11 | Status: SHIPPED | OUTPATIENT
Start: 2022-01-13

## 2022-02-08 DIAGNOSIS — E11.9 TYPE 2 DIABETES MELLITUS WITHOUT COMPLICATION, WITH LONG-TERM CURRENT USE OF INSULIN (HCC): Primary | ICD-10-CM

## 2022-02-08 DIAGNOSIS — Z79.4 TYPE 2 DIABETES MELLITUS WITHOUT COMPLICATION, WITH LONG-TERM CURRENT USE OF INSULIN (HCC): Primary | ICD-10-CM

## 2022-02-08 RX ORDER — INSULIN PUMP SYRINGE, 3 ML
EACH MISCELLANEOUS
Qty: 1 KIT | Refills: 0 | Status: SHIPPED | OUTPATIENT
Start: 2022-02-08

## 2022-02-08 RX ORDER — ALLOPURINOL 300 MG/1
TABLET ORAL
Qty: 90 TABLET | Refills: 0 | Status: SHIPPED | OUTPATIENT
Start: 2022-02-08 | End: 2022-05-24

## 2022-02-08 RX ORDER — NYSTATIN 100000 [USP'U]/G
POWDER TOPICAL 4 TIMES DAILY
Qty: 60 G | Refills: 1 | Status: SHIPPED | OUTPATIENT
Start: 2022-02-08

## 2022-02-15 RX ORDER — PANTOPRAZOLE SODIUM 40 MG/1
TABLET, DELAYED RELEASE ORAL
Qty: 90 TABLET | Refills: 4 | Status: SHIPPED | OUTPATIENT
Start: 2022-02-15

## 2022-03-15 DIAGNOSIS — E78.00 HYPERCHOLESTEROLEMIA: ICD-10-CM

## 2022-03-15 RX ORDER — SIMVASTATIN 40 MG/1
TABLET, FILM COATED ORAL
Qty: 90 TABLET | Refills: 4 | Status: SHIPPED | OUTPATIENT
Start: 2022-03-15

## 2022-03-15 RX ORDER — CIPROFLOXACIN 250 MG/1
250 TABLET, FILM COATED ORAL EVERY 12 HOURS
Qty: 10 TABLET | Refills: 0 | Status: SHIPPED | OUTPATIENT
Start: 2022-03-15 | End: 2022-03-20

## 2022-03-18 PROBLEM — E11.00 TYPE 2 DIABETES MELLITUS WITH HYPEROSMOLAR HYPERGLYCEMIC STATE (HHS) (HCC): Status: ACTIVE | Noted: 2021-05-17

## 2022-03-18 PROBLEM — N39.0 UTI (URINARY TRACT INFECTION): Status: ACTIVE | Noted: 2021-05-17

## 2022-03-19 PROBLEM — N18.32 STAGE 3B CHRONIC KIDNEY DISEASE (HCC): Status: ACTIVE | Noted: 2021-05-19

## 2022-03-31 DIAGNOSIS — Z79.4 TYPE 2 DIABETES MELLITUS WITH HYPERGLYCEMIA, WITH LONG-TERM CURRENT USE OF INSULIN (HCC): ICD-10-CM

## 2022-03-31 DIAGNOSIS — E11.65 TYPE 2 DIABETES MELLITUS WITH HYPERGLYCEMIA, WITH LONG-TERM CURRENT USE OF INSULIN (HCC): ICD-10-CM

## 2022-04-03 RX ORDER — INSULIN GLARGINE 100 [IU]/ML
INJECTION, SOLUTION SUBCUTANEOUS
Qty: 15 ML | Refills: 11 | Status: SHIPPED | OUTPATIENT
Start: 2022-04-03 | End: 2022-04-06

## 2022-04-06 DIAGNOSIS — Z79.4 TYPE 2 DIABETES MELLITUS WITH HYPERGLYCEMIA, WITH LONG-TERM CURRENT USE OF INSULIN (HCC): ICD-10-CM

## 2022-04-06 DIAGNOSIS — E11.65 TYPE 2 DIABETES MELLITUS WITH HYPERGLYCEMIA, WITH LONG-TERM CURRENT USE OF INSULIN (HCC): ICD-10-CM

## 2022-04-06 RX ORDER — INSULIN GLARGINE 100 [IU]/ML
INJECTION, SOLUTION SUBCUTANEOUS
Qty: 15 ML | Refills: 11 | Status: SHIPPED | OUTPATIENT
Start: 2022-04-06 | End: 2022-04-29

## 2022-04-29 DIAGNOSIS — Z79.4 TYPE 2 DIABETES MELLITUS WITH HYPERGLYCEMIA, WITH LONG-TERM CURRENT USE OF INSULIN (HCC): ICD-10-CM

## 2022-04-29 DIAGNOSIS — E11.65 TYPE 2 DIABETES MELLITUS WITH HYPERGLYCEMIA, WITH LONG-TERM CURRENT USE OF INSULIN (HCC): ICD-10-CM

## 2022-04-29 RX ORDER — INSULIN GLARGINE 100 [IU]/ML
INJECTION, SOLUTION SUBCUTANEOUS
Qty: 45 ML | Refills: 11 | Status: SHIPPED | OUTPATIENT
Start: 2022-04-29

## 2022-04-29 RX ORDER — INSULIN ASPART 100 [IU]/ML
INJECTION, SOLUTION INTRAVENOUS; SUBCUTANEOUS
Qty: 5 PEN | Refills: 11 | Status: SHIPPED | OUTPATIENT
Start: 2022-04-29 | End: 2022-05-13

## 2022-05-13 DIAGNOSIS — E11.9 TYPE 2 DIABETES MELLITUS WITHOUT COMPLICATION, WITH LONG-TERM CURRENT USE OF INSULIN (HCC): Primary | ICD-10-CM

## 2022-05-13 DIAGNOSIS — Z79.4 TYPE 2 DIABETES MELLITUS WITHOUT COMPLICATION, WITH LONG-TERM CURRENT USE OF INSULIN (HCC): Primary | ICD-10-CM

## 2022-05-13 RX ORDER — PEN NEEDLE, DIABETIC 30 GX3/16"
NEEDLE, DISPOSABLE MISCELLANEOUS
Qty: 1 EACH | Refills: 11 | Status: SHIPPED | OUTPATIENT
Start: 2022-05-13

## 2022-05-24 RX ORDER — ALLOPURINOL 300 MG/1
TABLET ORAL
Qty: 90 TABLET | Refills: 0 | Status: SHIPPED | OUTPATIENT
Start: 2022-05-24 | End: 2022-08-25

## 2022-06-14 RX ORDER — BLOOD SUGAR DIAGNOSTIC
STRIP MISCELLANEOUS
Qty: 100 STRIP | Refills: 5 | Status: SHIPPED | OUTPATIENT
Start: 2022-06-14

## 2022-08-16 RX ORDER — LISINOPRIL 20 MG/1
TABLET ORAL
Qty: 90 TABLET | Refills: 0 | Status: SHIPPED | OUTPATIENT
Start: 2022-08-16

## 2022-08-21 DIAGNOSIS — F32.A DEPRESSION, UNSPECIFIED DEPRESSION TYPE: ICD-10-CM

## 2022-08-23 RX ORDER — PAROXETINE 30 MG/1
TABLET, FILM COATED ORAL
Qty: 90 TABLET | Refills: 0 | Status: SHIPPED | OUTPATIENT
Start: 2022-08-23

## 2022-08-25 RX ORDER — ALLOPURINOL 300 MG/1
TABLET ORAL
Qty: 90 TABLET | Refills: 0 | Status: SHIPPED | OUTPATIENT
Start: 2022-08-25

## 2022-11-18 DIAGNOSIS — F32.A DEPRESSION, UNSPECIFIED DEPRESSION TYPE: ICD-10-CM

## 2022-11-18 RX ORDER — PAROXETINE 30 MG/1
TABLET, FILM COATED ORAL
Qty: 90 TABLET | Refills: 0 | Status: SHIPPED | OUTPATIENT
Start: 2022-11-18

## 2022-11-18 RX ORDER — LISINOPRIL 20 MG/1
TABLET ORAL
Qty: 90 TABLET | Refills: 0 | Status: SHIPPED | OUTPATIENT
Start: 2022-11-18

## 2022-11-23 RX ORDER — PEN NEEDLE, DIABETIC 31 GX3/16"
NEEDLE, DISPOSABLE MISCELLANEOUS
Qty: 200 PEN NEEDLE | Refills: 11 | Status: SHIPPED | OUTPATIENT
Start: 2022-11-23

## 2022-11-28 DIAGNOSIS — Z79.4 TYPE 2 DIABETES MELLITUS WITH HYPERGLYCEMIA, WITH LONG-TERM CURRENT USE OF INSULIN (HCC): ICD-10-CM

## 2022-11-28 DIAGNOSIS — E11.65 TYPE 2 DIABETES MELLITUS WITH HYPERGLYCEMIA, WITH LONG-TERM CURRENT USE OF INSULIN (HCC): ICD-10-CM

## 2022-11-29 RX ORDER — INSULIN GLARGINE 100 [IU]/ML
INJECTION, SOLUTION SUBCUTANEOUS
Qty: 45 ML | Refills: 11 | Status: SHIPPED | OUTPATIENT
Start: 2022-11-29

## 2022-12-06 RX ORDER — ALLOPURINOL 300 MG/1
TABLET ORAL
Qty: 90 TABLET | Refills: 0 | Status: SHIPPED | OUTPATIENT
Start: 2022-12-06

## 2022-12-20 NOTE — PROGRESS NOTES
Helio Tellez is a 59 y.o. female Chief Complaint Patient presents with  Labs  
  is fasting   
 pt here for her diabetes follow up and prev has been well controlled but is very overdue. Pt just on metformin last a1c 5.5. Pt also with HLD on statin and no issues with this medication either and BP remains well controlled. Pt is overdue for a mammo and will give an order. Eye appt for diabetes screening is coming uop in the next month per pt. BP remains well controlled on lisinopril hctz. Pt did get good effect from her recent shoulder injection L side. she is a 59y.o. year old female who presents for evalution. Reviewed PmHx, RxHx, FmHx, SocHx, AllgHx and updated and dated in the chart. Review of Systems - negative except as listed above in the HPI Objective:  
 
Vitals:  
 09/21/18 0725 BP: 111/75 Pulse: 81 Resp: 18 Temp: 98 °F (36.7 °C) SpO2: 97% Weight: 175 lb (79.4 kg) Height: 5' 2\" (1.575 m) Current Outpatient Prescriptions Medication Sig  PARoxetine (PAXIL) 30 mg tablet TAKE ONE TABLET BY MOUTH ONCE DAILY  lisinopril-hydroCHLOROthiazide (PRINZIDE, ZESTORETIC) 20-12.5 mg per tablet TAKE ONE TABLET BY MOUTH TWICE DAILY  simvastatin (ZOCOR) 40 mg tablet TAKE ONE TABLET BY MOUTH NIGHTLY  allopurinol (ZYLOPRIM) 300 mg tablet TAKE ONE TABLET BY MOUTH ONCE DAILY  metFORMIN (GLUCOPHAGE) 500 mg tablet TAKE ONE TABLET BY MOUTH TWICE DAILY WITH MEALS  pantoprazole (PROTONIX) 40 mg tablet TAKE ONE TABLET BY MOUTH ONCE DAILY  Insulin Needles, Disposable, (PEN NEEDLE) 31 gauge x 3/16\" ndle QHS use of lantus  Blood-Glucose Meter monitoring kit Please dispense one kit  glucose blood VI test strips (BLOOD GLUCOSE TEST) strip Use four times a day, before meals and at bedtime, to check your blood sugar.  ALPRAZolam (XANAX) 0.25 mg tablet Take 0.25 mg by mouth as needed for Anxiety.  omeprazole (PRILOSEC) 20 mg capsule Take 20 mg by mouth daily. No current facility-administered medications for this visit. Physical Examination: General appearance - alert, well appearing, and in no distress Chest - clear to auscultation, no wheezes, rales or rhonchi, symmetric air entry Heart - normal rate, regular rhythm, normal S1, S2, no murmurs, rubs, clicks or gallops Abdomen - soft, nontender, nondistended, no masses or organomegaly Ext normal pulses b/l normal sensation with 10 lbs monofilament Assessment/ Plan:  
Diagnoses and all orders for this visit: 
 
1. Type 2 diabetes mellitus without complication, without long-term current use of insulin ([facility] [address] -     METABOLIC PANEL, COMPREHENSIVE 
-     LIPID PANEL 
-     HEMOGLOBIN A1C WITH EAG 
-     MICROALBUMIN, UR, RAND W/ MICROALB/CREAT RATIO 
-      DIABETES FOOT EXAM 
 
2. Essential hypertension -     METABOLIC PANEL, COMPREHENSIVE 3. Hypercholesterolemia -     METABOLIC PANEL, COMPREHENSIVE 
-     LIPID PANEL 4. Encounter for screening mammogram for malignant neoplasm of breast 
-     Bilateral Digital Screening Mammography; Future 5. Encounter for immunization 6. Colon cancer screening 
-     COLOGUARD TEST (FECAL DNA COLORECTAL CANCER SCREENING) Follow-up Disposition: 
Return in about 6 months (around 3/21/2019), or if symptoms worsen or fail to improve. I have discussed the diagnosis with the patient and the intended plan as seen in the above orders. The patient has received an after-visit summary and questions were answered concerning future plans. Pt conveyed understanding of plan. Medication Side Effects and Warnings were discussed with patient Camryn Ivania, DO This is the Subsequent Medicare Annual Wellness Exam, performed 12 months or more after the Initial AWV or the last Subsequent AWV I have reviewed the patient's medical history in detail and updated the computerized patient record. History Past Medical History:  
Diagnosis Date [6] : 6 [5] : 5  Arthritis  [facility]  Gout  Hernia, hiatal   
 Hypertension Past Surgical History:  
Procedure Laterality Date  HX KNEE REPLACEMENT Right Current Outpatient Prescriptions Medication Sig Dispense Refill  PARoxetine (PAXIL) 30 mg tablet TAKE ONE TABLET BY MOUTH ONCE DAILY 90 Tab 0  
 lisinopril-hydroCHLOROthiazide (PRINZIDE, ZESTORETIC) 20-12.5 mg per tablet TAKE ONE TABLET BY MOUTH TWICE DAILY 180 Tab 3  
 simvastatin (ZOCOR) 40 mg tablet TAKE ONE TABLET BY MOUTH NIGHTLY 90 Tab 3  
 allopurinol (ZYLOPRIM) 300 mg tablet TAKE ONE TABLET BY MOUTH ONCE DAILY 90 Tab 3  
 metFORMIN (GLUCOPHAGE) 500 mg tablet TAKE ONE TABLET BY MOUTH TWICE DAILY WITH MEALS 180 Tab 3  pantoprazole (PROTONIX) 40 mg tablet TAKE ONE TABLET BY MOUTH ONCE DAILY 90 Tab 3  
 Insulin Needles, Disposable, (PEN NEEDLE) 31 gauge x 3/16\" ndle QHS use of lantus 30 Pen Needle 11  Blood-Glucose Meter monitoring kit Please dispense one kit 1 Kit 0  
 glucose blood VI test strips (BLOOD GLUCOSE TEST) strip Use four times a day, before meals and at bedtime, to check your blood sugar. 100 Strip 11  
 ALPRAZolam (XANAX) 0.25 mg tablet Take 0.25 mg by mouth as needed for Anxiety.  omeprazole (PRILOSEC) 20 mg capsule Take 20 mg by mouth daily. Allergies Allergen Reactions  Sulfa (Sulfonamide Antibiotics) Other (comments) Emesis Family History Problem Relation Age of Onset  Cancer Mother  Heart Disease Father  Diabetes Maternal Grandmother Social History Substance Use Topics  Smoking status: Never Smoker  Smokeless tobacco: Never Used  Alcohol use No  
 
Patient Active Problem List  
Diagnosis Code  DM type 2 (diabetes mellitus, type 2) ([facility]) E11.9  
 HTN (hypertension) I10  
 Hypercholesterolemia E78.00  Psoriatic arthritis ([facility] [address] L40.50  Psoriasis L40.9  GERD (gastroesophageal reflux disease) K21.9  Personal history of gout Z87.39  
 [Dull/Aching] : dull/aching  Depression F32.9  
 UTI (lower urinary tract infection) N39.0 Depression Risk Factor Screening: PHQ over the last two weeks 4/1/2015 Little interest or pleasure in doing things Not at all Feeling down, depressed, irritable, or hopeless Not at all Total Score PHQ 2 0 Alcohol Risk Factor Screening: You do not drink alcohol or very rarely. Functional Ability and Level of Safety:  
Hearing Loss Hearing is good. Activities of Daily Living The home contains: no safety equipment. Patient does total self care Fall Risk No flowsheet data found. Abuse Screen Patient is not abused Cognitive Screening Evaluation of Cognitive Function: 
Has your family/caregiver stated any concerns about your memory: no 
Normal 
 
Patient Care Team  
Patient Care Team: 
Deacon Arrieta DO as PCP - General (Family Practice) Talisha Thorpe RN as Ambulatory Care Navigator Assessment/Plan Education and counseling provided: 
Are appropriate based on today's review and evaluation End-of-Life planning (with patient's consent) Pneumococcal Vaccine Diagnoses and all orders for this visit: 
 
1. Type 2 diabetes mellitus without complication, without long-term current use of insulin ([facility] [address] -     METABOLIC PANEL, COMPREHENSIVE 
-     LIPID PANEL 
-     HEMOGLOBIN A1C WITH EAG 
-     MICROALBUMIN, UR, RAND W/ MICROALB/CREAT RATIO 
-      DIABETES FOOT EXAM 
 
2. Essential hypertension -     METABOLIC PANEL, COMPREHENSIVE 3. Hypercholesterolemia -     METABOLIC PANEL, COMPREHENSIVE 
-     LIPID PANEL 4. Encounter for screening mammogram for malignant neoplasm of breast 
-     Bilateral Digital Screening Mammography; Future 5. Encounter for immunization 6. Colon cancer screening 
-     COLOGUARD TEST (FECAL DNA COLORECTAL CANCER SCREENING) Health Maintenance Due Topic Date Due  Pneumococcal 19-64 Medium Risk (1 of 1 - PPSV23) 09/01/1975 [Shooting] : shooting  PAP AKA CERVICAL CYTOLOGY  09/01/1977  BREAST CANCER SCRN MAMMOGRAM  09/01/2006  FOBT Q 1 YEAR AGE 50-75  09/01/2006  ZOSTER VACCINE AGE 60>  07/01/2016  
 EYE EXAM RETINAL OR DILATED Q1  11/06/2016  
 HEMOGLOBIN A1C Q6M  12/06/2017  MICROALBUMIN Q1  06/06/2018  LIPID PANEL Q1  06/06/2018 I have discussed the diagnosis with the patient and the intended plan as seen in the above orders. The patient has received an after-visit summary and questions were answered concerning future plans. Pt conveyed understanding of plan. Dr Veena Queen [Ice] : ice [de-identified] : R radial sided wrist pain  [] : no [FreeTextEntry1] : right wrist [FreeTextEntry3] : few months ago [FreeTextEntry5] : no injury\par no N/T [de-identified] : activity [de-identified] : few months ago [de-identified] : neuro [de-identified] : xr

## 2023-01-27 ENCOUNTER — PATIENT OUTREACH (OUTPATIENT)
Dept: CASE MANAGEMENT | Age: 68
End: 2023-01-27

## 2023-02-01 ENCOUNTER — PATIENT OUTREACH (OUTPATIENT)
Dept: CASE MANAGEMENT | Age: 68
End: 2023-02-01

## 2023-02-14 ENCOUNTER — PATIENT OUTREACH (OUTPATIENT)
Dept: CASE MANAGEMENT | Age: 68
End: 2023-02-14

## 2023-02-17 DIAGNOSIS — F32.A DEPRESSION, UNSPECIFIED DEPRESSION TYPE: ICD-10-CM

## 2023-02-17 RX ORDER — PAROXETINE 30 MG/1
TABLET, FILM COATED ORAL
Qty: 90 TABLET | Refills: 0 | Status: SHIPPED | OUTPATIENT
Start: 2023-02-17

## 2023-02-28 RX ORDER — LISINOPRIL 20 MG/1
TABLET ORAL
Qty: 90 TABLET | Refills: 0 | Status: SHIPPED | OUTPATIENT
Start: 2023-02-28 | End: 2023-03-02

## 2023-03-02 RX ORDER — LISINOPRIL 20 MG/1
TABLET ORAL
Qty: 90 TABLET | Refills: 3 | Status: SHIPPED | OUTPATIENT
Start: 2023-03-02

## 2023-03-14 RX ORDER — ALLOPURINOL 300 MG/1
TABLET ORAL
Qty: 90 TABLET | Refills: 0 | Status: SHIPPED | OUTPATIENT
Start: 2023-03-14

## 2023-03-22 RX ORDER — PANTOPRAZOLE SODIUM 40 MG/1
TABLET, DELAYED RELEASE ORAL
Qty: 90 TABLET | Refills: 0 | Status: SHIPPED | OUTPATIENT
Start: 2023-03-22

## 2023-04-19 DIAGNOSIS — E11.9 TYPE 2 DIABETES MELLITUS WITHOUT COMPLICATION, WITH LONG-TERM CURRENT USE OF INSULIN (HCC): ICD-10-CM

## 2023-04-19 DIAGNOSIS — Z79.4 TYPE 2 DIABETES MELLITUS WITHOUT COMPLICATION, WITH LONG-TERM CURRENT USE OF INSULIN (HCC): ICD-10-CM

## 2023-04-20 RX ORDER — PEN NEEDLE, DIABETIC 30 GX3/16"
NEEDLE, DISPOSABLE MISCELLANEOUS
Qty: 400 EACH | Refills: 11 | Status: SHIPPED | OUTPATIENT
Start: 2023-04-20

## 2023-04-20 RX ORDER — BLOOD SUGAR DIAGNOSTIC
STRIP MISCELLANEOUS
Qty: 100 STRIP | Refills: 5 | Status: SHIPPED | OUTPATIENT
Start: 2023-04-20

## 2023-05-20 RX ORDER — ACETAMINOPHEN 325 MG/1
TABLET ORAL EVERY 4 HOURS PRN
COMMUNITY

## 2023-05-20 RX ORDER — SIMVASTATIN 40 MG
1 TABLET ORAL NIGHTLY
COMMUNITY
Start: 2022-03-15 | End: 2023-06-06

## 2023-05-20 RX ORDER — FEXOFENADINE HCL 180 MG/1
180 TABLET ORAL DAILY PRN
COMMUNITY

## 2023-05-20 RX ORDER — NYSTATIN 100000 [USP'U]/G
POWDER TOPICAL 4 TIMES DAILY
COMMUNITY
Start: 2022-02-08

## 2023-05-20 RX ORDER — LISINOPRIL 20 MG/1
1 TABLET ORAL DAILY
COMMUNITY
Start: 2023-03-02

## 2023-05-20 RX ORDER — LANCETS 30 GAUGE
EACH MISCELLANEOUS
COMMUNITY
Start: 2021-05-19

## 2023-05-20 RX ORDER — ALPRAZOLAM 0.25 MG/1
0.25 TABLET ORAL PRN
COMMUNITY

## 2023-05-20 RX ORDER — LIDOCAINE 50 MG/G
1 PATCH TOPICAL DAILY PRN
COMMUNITY

## 2023-05-20 RX ORDER — PANTOPRAZOLE SODIUM 40 MG/1
1 TABLET, DELAYED RELEASE ORAL DAILY
COMMUNITY
Start: 2023-03-22 | End: 2023-06-13

## 2023-05-20 RX ORDER — ALLOPURINOL 300 MG/1
1 TABLET ORAL DAILY
COMMUNITY
Start: 2023-03-14 | End: 2023-06-06

## 2023-05-20 RX ORDER — INSULIN GLARGINE 100 [IU]/ML
INJECTION, SOLUTION SUBCUTANEOUS
COMMUNITY
Start: 2022-11-29

## 2023-05-20 RX ORDER — PAROXETINE 30 MG/1
1 TABLET, FILM COATED ORAL DAILY
COMMUNITY
Start: 2023-02-17 | End: 2023-05-23

## 2023-05-23 RX ORDER — PAROXETINE 30 MG/1
TABLET, FILM COATED ORAL
Qty: 90 TABLET | Refills: 0 | Status: SHIPPED | OUTPATIENT
Start: 2023-05-23

## 2023-05-31 ENCOUNTER — TELEPHONE (OUTPATIENT)
Age: 68
End: 2023-05-31

## 2023-05-31 NOTE — TELEPHONE ENCOUNTER
----- Message from Vasquez Medina DO sent at 5/30/2023  3:09 PM EDT -----  Regarding: RE: Antibiotic  Contact: [phone]  Yes with kenneth or someone  ----- Message -----  From: Wilmer Carreno  Sent: 5/30/2023   3:03 PM EDT  To: Vasquez Medina DO  Subject: FW: Antibiotic                                   Did you need her to make an apt?  ----- Message -----  From: Vasquez Medina DO  Sent: 5/30/2023   9:33 AM EDT  To: Amy Perez  Staff  Subject: FW: Antibiotic                                     ----- Message -----  From: Jimbo Huber LPN  Sent: 4/42/8339   7:38 AM EDT  To: Vasquez Medina DO  Subject: FW: Antibiotic                                     ----- Message -----  From: Jane Boggs  Sent: 5/28/2023   6:33 PM EDT  To: [facility] Clinical Staff  Subject: Antibiotic                                       Dr Narda Nath  I think this uti is back. I looked but didnt see the medicine I took. I need the last one you sent, as the first 2 made me sick.   Thank you,   Willow Means

## 2023-06-06 RX ORDER — SIMVASTATIN 40 MG
TABLET ORAL NIGHTLY
Qty: 90 TABLET | Refills: 0 | Status: SHIPPED | OUTPATIENT
Start: 2023-06-06

## 2023-06-06 RX ORDER — ALLOPURINOL 300 MG/1
TABLET ORAL
Qty: 90 TABLET | Refills: 0 | Status: SHIPPED | OUTPATIENT
Start: 2023-06-06

## 2023-06-08 ENCOUNTER — TELEPHONE (OUTPATIENT)
Age: 68
End: 2023-06-08

## 2023-07-05 RX ORDER — SYRINGE AND NEEDLE,INSULIN,1ML 29 G X1/2"
SYRINGE, EMPTY DISPOSABLE MISCELLANEOUS
Qty: 90 EACH | Refills: 3 | Status: SHIPPED | OUTPATIENT
Start: 2023-07-05

## 2023-08-22 RX ORDER — PAROXETINE 30 MG/1
TABLET, FILM COATED ORAL
Qty: 90 TABLET | Refills: 3 | Status: SHIPPED | OUTPATIENT
Start: 2023-08-22

## 2023-09-12 RX ORDER — SIMVASTATIN 40 MG
TABLET ORAL NIGHTLY
Qty: 90 TABLET | Refills: 0 | Status: SHIPPED | OUTPATIENT
Start: 2023-09-12

## 2023-09-19 RX ORDER — ALLOPURINOL 300 MG/1
TABLET ORAL
Qty: 90 TABLET | Refills: 3 | Status: SHIPPED | OUTPATIENT
Start: 2023-09-19

## 2023-09-19 RX ORDER — PANTOPRAZOLE SODIUM 40 MG/1
TABLET, DELAYED RELEASE ORAL
Qty: 90 TABLET | Refills: 3 | Status: SHIPPED | OUTPATIENT
Start: 2023-09-19

## 2023-12-12 RX ORDER — SIMVASTATIN 40 MG
TABLET ORAL
Qty: 90 TABLET | Refills: 3 | Status: SHIPPED | OUTPATIENT
Start: 2023-12-12

## 2024-01-08 RX ORDER — PEN NEEDLE, DIABETIC 32GX 5/32"
NEEDLE, DISPOSABLE MISCELLANEOUS
Qty: 200 EACH | Refills: 0 | Status: SHIPPED | OUTPATIENT
Start: 2024-01-08

## 2024-01-14 DIAGNOSIS — E11.65 TYPE 2 DIABETES MELLITUS WITH HYPERGLYCEMIA, WITH LONG-TERM CURRENT USE OF INSULIN (HCC): ICD-10-CM

## 2024-01-14 DIAGNOSIS — Z79.4 TYPE 2 DIABETES MELLITUS WITH HYPERGLYCEMIA, WITH LONG-TERM CURRENT USE OF INSULIN (HCC): ICD-10-CM

## 2024-01-16 RX ORDER — INSULIN GLARGINE 100 [IU]/ML
INJECTION, SOLUTION SUBCUTANEOUS
Qty: 15 ML | Refills: 10 | Status: SHIPPED | OUTPATIENT
Start: 2024-01-16

## 2024-03-13 ENCOUNTER — TELEPHONE (OUTPATIENT)
Age: 69
End: 2024-03-13

## 2024-03-13 DIAGNOSIS — E11.65 TYPE 2 DIABETES MELLITUS WITH HYPERGLYCEMIA, WITH LONG-TERM CURRENT USE OF INSULIN (HCC): ICD-10-CM

## 2024-03-13 DIAGNOSIS — Z79.4 TYPE 2 DIABETES MELLITUS WITH HYPERGLYCEMIA, WITH LONG-TERM CURRENT USE OF INSULIN (HCC): ICD-10-CM

## 2024-03-18 RX ORDER — LISINOPRIL 20 MG/1
20 TABLET ORAL DAILY
Qty: 90 TABLET | Refills: 0 | Status: SHIPPED | OUTPATIENT
Start: 2024-03-18

## 2024-04-23 RX ORDER — PEN NEEDLE, DIABETIC 32GX 5/32"
NEEDLE, DISPOSABLE MISCELLANEOUS
Qty: 200 EACH | Refills: 0 | Status: SHIPPED | OUTPATIENT
Start: 2024-04-23

## 2024-05-03 ENCOUNTER — OFFICE VISIT (OUTPATIENT)
Age: 69
End: 2024-05-03
Payer: MEDICARE

## 2024-05-03 VITALS
OXYGEN SATURATION: 94 % | DIASTOLIC BLOOD PRESSURE: 82 MMHG | SYSTOLIC BLOOD PRESSURE: 137 MMHG | RESPIRATION RATE: 16 BRPM | HEART RATE: 82 BPM | WEIGHT: 194 LBS | TEMPERATURE: 98.2 F | BODY MASS INDEX: 35.7 KG/M2 | HEIGHT: 62 IN

## 2024-05-03 DIAGNOSIS — E78.00 HYPERCHOLESTEROLEMIA: ICD-10-CM

## 2024-05-03 DIAGNOSIS — L40.50 ARTHROPATHIC PSORIASIS, UNSPECIFIED (HCC): ICD-10-CM

## 2024-05-03 DIAGNOSIS — Z79.4 TYPE 2 DIABETES MELLITUS WITH HYPERGLYCEMIA, WITH LONG-TERM CURRENT USE OF INSULIN (HCC): Primary | ICD-10-CM

## 2024-05-03 DIAGNOSIS — E11.65 TYPE 2 DIABETES MELLITUS WITH HYPERGLYCEMIA, WITH LONG-TERM CURRENT USE OF INSULIN (HCC): Primary | ICD-10-CM

## 2024-05-03 DIAGNOSIS — E66.01 SEVERE OBESITY (BMI 35.0-39.9) WITH COMORBIDITY (HCC): ICD-10-CM

## 2024-05-03 DIAGNOSIS — N18.32 STAGE 3B CHRONIC KIDNEY DISEASE (HCC): ICD-10-CM

## 2024-05-03 PROCEDURE — G8417 CALC BMI ABV UP PARAM F/U: HCPCS | Performed by: FAMILY MEDICINE

## 2024-05-03 PROCEDURE — 3079F DIAST BP 80-89 MM HG: CPT | Performed by: FAMILY MEDICINE

## 2024-05-03 PROCEDURE — 1090F PRES/ABSN URINE INCON ASSESS: CPT | Performed by: FAMILY MEDICINE

## 2024-05-03 PROCEDURE — G8427 DOCREV CUR MEDS BY ELIG CLIN: HCPCS | Performed by: FAMILY MEDICINE

## 2024-05-03 PROCEDURE — 1123F ACP DISCUSS/DSCN MKR DOCD: CPT | Performed by: FAMILY MEDICINE

## 2024-05-03 PROCEDURE — 99214 OFFICE O/P EST MOD 30 MIN: CPT | Performed by: FAMILY MEDICINE

## 2024-05-03 PROCEDURE — 3017F COLORECTAL CA SCREEN DOC REV: CPT | Performed by: FAMILY MEDICINE

## 2024-05-03 PROCEDURE — 3046F HEMOGLOBIN A1C LEVEL >9.0%: CPT | Performed by: FAMILY MEDICINE

## 2024-05-03 PROCEDURE — 3075F SYST BP GE 130 - 139MM HG: CPT | Performed by: FAMILY MEDICINE

## 2024-05-03 PROCEDURE — G8400 PT W/DXA NO RESULTS DOC: HCPCS | Performed by: FAMILY MEDICINE

## 2024-05-03 PROCEDURE — 2022F DILAT RTA XM EVC RTNOPTHY: CPT | Performed by: FAMILY MEDICINE

## 2024-05-03 PROCEDURE — 1036F TOBACCO NON-USER: CPT | Performed by: FAMILY MEDICINE

## 2024-05-03 RX ORDER — INSULIN GLARGINE 100 [IU]/ML
INJECTION, SOLUTION SUBCUTANEOUS
Qty: 18 ML | Refills: 7 | Status: SHIPPED | OUTPATIENT
Start: 2024-05-03

## 2024-05-03 SDOH — ECONOMIC STABILITY: HOUSING INSECURITY
IN THE LAST 12 MONTHS, WAS THERE A TIME WHEN YOU DID NOT HAVE A STEADY PLACE TO SLEEP OR SLEPT IN A SHELTER (INCLUDING NOW)?: NO

## 2024-05-03 SDOH — ECONOMIC STABILITY: INCOME INSECURITY: HOW HARD IS IT FOR YOU TO PAY FOR THE VERY BASICS LIKE FOOD, HOUSING, MEDICAL CARE, AND HEATING?: NOT HARD AT ALL

## 2024-05-03 SDOH — ECONOMIC STABILITY: FOOD INSECURITY: WITHIN THE PAST 12 MONTHS, YOU WORRIED THAT YOUR FOOD WOULD RUN OUT BEFORE YOU GOT MONEY TO BUY MORE.: NEVER TRUE

## 2024-05-03 SDOH — ECONOMIC STABILITY: FOOD INSECURITY: WITHIN THE PAST 12 MONTHS, THE FOOD YOU BOUGHT JUST DIDN'T LAST AND YOU DIDN'T HAVE MONEY TO GET MORE.: NEVER TRUE

## 2024-05-03 ASSESSMENT — PATIENT HEALTH QUESTIONNAIRE - PHQ9
3. TROUBLE FALLING OR STAYING ASLEEP: NOT AT ALL
7. TROUBLE CONCENTRATING ON THINGS, SUCH AS READING THE NEWSPAPER OR WATCHING TELEVISION: NOT AT ALL
1. LITTLE INTEREST OR PLEASURE IN DOING THINGS: NOT AT ALL
4. FEELING TIRED OR HAVING LITTLE ENERGY: NOT AT ALL
SUM OF ALL RESPONSES TO PHQ QUESTIONS 1-9: 0
SUM OF ALL RESPONSES TO PHQ QUESTIONS 1-9: 0
8. MOVING OR SPEAKING SO SLOWLY THAT OTHER PEOPLE COULD HAVE NOTICED. OR THE OPPOSITE, BEING SO FIGETY OR RESTLESS THAT YOU HAVE BEEN MOVING AROUND A LOT MORE THAN USUAL: NOT AT ALL
SUM OF ALL RESPONSES TO PHQ QUESTIONS 1-9: 0
SUM OF ALL RESPONSES TO PHQ QUESTIONS 1-9: 0
10. IF YOU CHECKED OFF ANY PROBLEMS, HOW DIFFICULT HAVE THESE PROBLEMS MADE IT FOR YOU TO DO YOUR WORK, TAKE CARE OF THINGS AT HOME, OR GET ALONG WITH OTHER PEOPLE: NOT DIFFICULT AT ALL
SUM OF ALL RESPONSES TO PHQ9 QUESTIONS 1 & 2: 0
6. FEELING BAD ABOUT YOURSELF - OR THAT YOU ARE A FAILURE OR HAVE LET YOURSELF OR YOUR FAMILY DOWN: NOT AT ALL
2. FEELING DOWN, DEPRESSED OR HOPELESS: NOT AT ALL
9. THOUGHTS THAT YOU WOULD BE BETTER OFF DEAD, OR OF HURTING YOURSELF: NOT AT ALL
5. POOR APPETITE OR OVEREATING: NOT AT ALL

## 2024-05-03 NOTE — PATIENT INSTRUCTIONS

## 2024-05-03 NOTE — PROGRESS NOTES
Progress Note    she is a 68 y.o. year old female who presents for evaluation.    Subjective:     Pt states her sugars fluctuate and is getting better overall.  Lowest is 90's but up into the mid 200's. 50u of Lantus daily, no lows.  Drink of choice is 7up, usually diet.  LDL has not been at goal and she is taking the higher dose of the statin.  Short acting at 14u before each meal and her post prandial is upper 100's low 2's.       is now on Hospice and pt is thankful for the information and states it is a big help.   Reviewed PmHx, RxHx, FmHx, SocHx, AllgHx and updated and dated in the chart.    Review of Systems - negative except as listed above in the HPI    Objective:     Vitals:    05/03/24 1006   BP: 137/82   Site: Right Upper Arm   Position: Sitting   Pulse: 82   Resp: 16   Temp: 98.2 °F (36.8 °C)   TempSrc: Oral   SpO2: 94%   Weight: 88 kg (194 lb)   Height: 1.575 m (5' 2\")       Current Outpatient Medications   Medication Sig    insulin regular (HUMULIN R;NOVOLIN R) 100 UNIT/ML injection 16u before each meal max daily 48u Pt wants generic insulin    LANTUS SOLOSTAR 100 UNIT/ML injection pen INJECT 54 UNITS SUBCUTANEOUSLY ONCE DAILY    BD PEN NEEDLE FLOR 2ND GEN 32G X 4 MM MISC USE AS DIRECTED 4 TIMES DAILY FOR  INJECTIONS    lisinopril (PRINIVIL;ZESTRIL) 20 MG tablet Take 1 tablet by mouth once daily    simvastatin (ZOCOR) 80 MG tablet Take 1 tablet by mouth nightly    pantoprazole (PROTONIX) 40 MG tablet Take 1 tablet by mouth once daily    allopurinol (ZYLOPRIM) 300 MG tablet Take 1 tablet by mouth once daily    PARoxetine (PAXIL) 30 MG tablet Take 1 tablet by mouth once daily    RELION INSULIN SYRINGE 1ML/31G 31G X 5/16\" 1 ML MISC USE AS DIRECTED WITH REGULAR INSULIN IN VIALS    Lancets MISC Dispense 100 Lancets    acetaminophen (TYLENOL) 325 MG tablet Take by mouth every 4 hours as needed    fexofenadine (ALLEGRA) 180 MG tablet Take 1 tablet by mouth daily as needed    ALPRAZolam (XANAX)

## 2024-05-03 NOTE — PROGRESS NOTES
Chief Complaint   Patient presents with    Diabetes     Patient presents in office today for diabetes check.  No concerns.      \"Have you been to the ER, urgent care clinic since your last visit?  Hospitalized since your last visit?\"    NO    “Have you seen or consulted any other health care providers outside of [facility] since your last visit?”    NO    Have you had a mammogram?”   NO    No breast cancer screening on file         “Have you had a colorectal cancer screening such as a colonoscopy/FIT/Cologuard?    NO    No colonoscopy on file  No cologuard on file  No FIT/FOBT on file   No flexible sigmoidoscopy on file         Click Here for Release of Records Request

## 2024-05-04 LAB
ALBUMIN SERPL-MCNC: 3.6 G/DL (ref 3.5–5)
ALBUMIN/GLOB SERPL: 1.1 (ref 1.1–2.2)
ALP SERPL-CCNC: 92 U/L (ref 45–117)
ALT SERPL-CCNC: 24 U/L (ref 12–78)
ANION GAP SERPL CALC-SCNC: 6 MMOL/L (ref 5–15)
AST SERPL-CCNC: 23 U/L (ref 15–37)
BILIRUB SERPL-MCNC: 0.3 MG/DL (ref 0.2–1)
BUN SERPL-MCNC: 24 MG/DL (ref 6–20)
BUN/CREAT SERPL: 17 (ref 12–20)
CALCIUM SERPL-MCNC: 9.7 MG/DL (ref 8.5–10.1)
CHLORIDE SERPL-SCNC: 107 MMOL/L (ref 97–108)
CHOLEST SERPL-MCNC: 150 MG/DL
CO2 SERPL-SCNC: 27 MMOL/L (ref 21–32)
CREAT SERPL-MCNC: 1.44 MG/DL (ref 0.55–1.02)
EST. AVERAGE GLUCOSE BLD GHB EST-MCNC: 200 MG/DL
GLOBULIN SER CALC-MCNC: 3.2 G/DL (ref 2–4)
GLUCOSE SERPL-MCNC: 157 MG/DL (ref 65–100)
HBA1C MFR BLD: 8.6 % (ref 4–5.6)
HDLC SERPL-MCNC: 57 MG/DL
HDLC SERPL: 2.6 (ref 0–5)
LDLC SERPL CALC-MCNC: 68.8 MG/DL (ref 0–100)
POTASSIUM SERPL-SCNC: 4.5 MMOL/L (ref 3.5–5.1)
PROT SERPL-MCNC: 6.8 G/DL (ref 6.4–8.2)
SODIUM SERPL-SCNC: 140 MMOL/L (ref 136–145)
TRIGL SERPL-MCNC: 121 MG/DL
VLDLC SERPL CALC-MCNC: 24.2 MG/DL

## 2024-05-15 ENCOUNTER — CLINICAL DOCUMENTATION (OUTPATIENT)
Age: 69
End: 2024-05-15

## 2024-05-15 NOTE — PROGRESS NOTES
Faxed 05/03/24 office note/lab results to Dr M[facility]. Fax #[phone] confirmation received.

## 2024-06-10 DIAGNOSIS — Z79.4 TYPE 2 DIABETES MELLITUS WITH HYPERGLYCEMIA, WITH LONG-TERM CURRENT USE OF INSULIN (HCC): ICD-10-CM

## 2024-06-10 DIAGNOSIS — E11.65 TYPE 2 DIABETES MELLITUS WITH HYPERGLYCEMIA, WITH LONG-TERM CURRENT USE OF INSULIN (HCC): ICD-10-CM

## 2024-06-10 RX ORDER — INSULIN GLARGINE 100 [IU]/ML
INJECTION, SOLUTION SUBCUTANEOUS
Qty: 18 ML | Refills: 7 | Status: SHIPPED | OUTPATIENT
Start: 2024-06-10

## 2024-06-18 RX ORDER — SIMVASTATIN 80 MG
80 TABLET ORAL NIGHTLY
Qty: 90 TABLET | Refills: 3 | Status: SHIPPED | OUTPATIENT
Start: 2024-06-18

## 2024-08-26 ENCOUNTER — TELEPHONE (OUTPATIENT)
Age: 69
End: 2024-08-26

## 2024-08-26 RX ORDER — PAROXETINE 30 MG/1
30 TABLET, FILM COATED ORAL DAILY
Qty: 90 TABLET | Refills: 3 | Status: SHIPPED | OUTPATIENT
Start: 2024-08-26

## 2024-08-26 NOTE — TELEPHONE ENCOUNTER
[facility] needs a refill of PARoxetine (PAXIL) 30 MG tablet .  They have 0 pills/supply left and are requesting a ? day supply with refills.  Pharmacy has been updated in the chart. Patient was advised or scheduled an appointment for the future and to request refills thru the [employer] [facility] or by requesting a refill from their pharmacy in the future.  Patient was also advised to check with their pharmacy for status of when refills are available.

## 2024-10-28 RX ORDER — PANTOPRAZOLE SODIUM 40 MG/1
40 TABLET, DELAYED RELEASE ORAL DAILY
Qty: 90 TABLET | Refills: 3 | Status: SHIPPED | OUTPATIENT
Start: 2024-10-28

## 2024-10-28 RX ORDER — ALLOPURINOL 300 MG/1
300 TABLET ORAL DAILY
Qty: 90 TABLET | Refills: 3 | Status: SHIPPED | OUTPATIENT
Start: 2024-10-28

## 2025-01-07 DIAGNOSIS — Z79.4 TYPE 2 DIABETES MELLITUS WITH HYPERGLYCEMIA, WITH LONG-TERM CURRENT USE OF INSULIN (HCC): ICD-10-CM

## 2025-01-07 DIAGNOSIS — E11.65 TYPE 2 DIABETES MELLITUS WITH HYPERGLYCEMIA, WITH LONG-TERM CURRENT USE OF INSULIN (HCC): ICD-10-CM

## 2025-01-07 RX ORDER — INSULIN GLARGINE 100 [IU]/ML
INJECTION, SOLUTION SUBCUTANEOUS
Qty: 18 ML | Refills: 7 | Status: SHIPPED | OUTPATIENT
Start: 2025-01-07

## 2025-02-06 ENCOUNTER — COMMUNITY OUTREACH (OUTPATIENT)
Facility: CLINIC | Age: 70
End: 2025-02-06

## 2025-02-06 NOTE — PROGRESS NOTES
Patient's HM shows they are overdue for Mammogram, Colorectal Screening   Care Everywhere and  files searched.  No results to attach to order nor HM updated.  Orders for both mammogram and cologuard were placed back in 2023, no results on file, no new orders.

## 2025-02-14 ENCOUNTER — TELEPHONE (OUTPATIENT)
Facility: CLINIC | Age: 70
End: 2025-02-14

## 2025-02-15 SDOH — HEALTH STABILITY: PHYSICAL HEALTH
ON AVERAGE, HOW MANY DAYS PER WEEK DO YOU ENGAGE IN MODERATE TO STRENUOUS EXERCISE (LIKE A BRISK WALK)?: PATIENT DECLINED

## 2025-02-15 ASSESSMENT — PATIENT HEALTH QUESTIONNAIRE - PHQ9
2. FEELING DOWN, DEPRESSED OR HOPELESS: NOT AT ALL
SUM OF ALL RESPONSES TO PHQ QUESTIONS 1-9: 0
1. LITTLE INTEREST OR PLEASURE IN DOING THINGS: NOT AT ALL
SUM OF ALL RESPONSES TO PHQ QUESTIONS 1-9: 0

## 2025-02-15 ASSESSMENT — LIFESTYLE VARIABLES
HOW MANY STANDARD DRINKS CONTAINING ALCOHOL DO YOU HAVE ON A TYPICAL DAY: PATIENT DOES NOT DRINK
HOW OFTEN DO YOU HAVE A DRINK CONTAINING ALCOHOL: NEVER

## 2025-02-19 NOTE — PROGRESS NOTES
[address] [facility] [place] RESIDENCY PROGRAM  PROGRESS NOTE     5/18/2021  PCP: Ioana Maddox, DO     [facility] Residency Attending Addendum:  I saw and evaluated the patient on the day of the encounter with Dr. Magnolia Peter, performing the key elements of the service. I discussed the findings, assessment and plan with the resident and agree with the resident's findings and plan as documented in the resident's note. Transition to subcu insulin. Diabetic education today. Continued continue to monitor creatinine. Improved today. Potential discharge tomorrow. Sam Steiner MD, Rolando Gross, RMSSANDRA      Assessment/Plan:     Leny Lewis is a 60 y.o. female with a PMHx of DM type II, CKD3B, HTN, HLD, GERD, depression, and gout who is admitted for HHS and UTI. Overnight events: BP low with MAPs in the low to mid 60s. Responded to NS bolus. Telemetry reviewed: NSR, rate in the 70s-80s.      HHS in the setting of type II DM - improving: POA , pH 7.51 (VBG with resp alkalosis), bicarb 27, AG 9. No ketonuria, however glucose > 1000 on UA. Likely related to underlying UTI and poorly controlled DM. Last A1c 7% in December 2020. Patient is only on home Pioglitazone 30 mg daily at this time. Metformin was stopped by PCP due to borderline GFR.   - Start SQ insulin at this time - Lantus 35 U daily (0.4U/kg)  - Continue glucostabilizer per protocol for an additional 2 hours   - Start diabetic diet   - POC glucose q1h, BMP and mag q4h  - Continue D5 0.45% NS + 20 KCl at 200 mL/hour.   - Serum osmolality and A1c pending   - Holding home Pioglitazone    - Hypoglycemia protocols ordered  - Diabetes education consulted      Severe sepsis 2/2 UTI: POA LA 2.5 > 1.7. No leukocytosis, fever, CVA tenderness, abdominal pain. UA with + nitrites, small LE,  WBC, no blood, few epi cells, no bacteria but budding yeast noted. - S/p Rocephin 2 g IV in the ED.    - Will broaden ABX to IV Cefepime and Vanc to Post procedure care reviewed with patient, ok to resume normal activity with no restrictions. Patient verbalized understanding and will follow up surgery as planned.    cover for pseudomonas and enterococcus  - BCx and UCx pending   - Monitor I/Os      Elevated creatinine superimposed on CKD3B - improving: POA Cr 2.17 (BL ~1.5), trending down. Secondary to IVVD in the setting of HHS. - Continue IVF as above  - Strict I/Os  - Avoid nephrotoxic agents - holding home Lisinopril, HCTZ, allopurinol   - Consider outpatient follow up with nephrology and renal US     HTN: On Prinzide 40-25 mg daily at home.   - HOLD home medications of Lisinopril andHCTZ due to elevated Cr and hypotension   - Will continue to monitor at this time and readjust as BPs trend     HLD: Last lipid panel from December 2020 with total chol 181, , HDL 64, LDL 89.4. On simvastatin 40 mg qhs at home. - Sub home simvastatin for atorvastatin 20 mg qhs while inpatient      GERD: Stable on home Protonix 40 mg daily   - Continue home Protonix      Depression: Stable on home Paxil 30 mg qhs  - Continue home medication      Gout: No recent flared. Takes Allopurinol 300 mg daily.   - HOLD home allopurinol 300 mg daily   - Consider resuming renally dosed allopurinol on discharge      Obesity:  - The pt's Body mass index is 36.33 kg/m². - Encouraging lifestyle modifications and further follow up outpatient.        FEN/GI - Diabetic diet. D5 0.45% NS + 20 KCl at 200 mL/hour. Activity - Ambulate as tolerated  DVT prophylaxis - Sub Q Heparin  GI prophylaxis - Protonix  Fall prophylaxis - Not indicated at this time. Disposition - Plan to d/c to Home. Consulting PT, OT and CM  Code Status - Full. Discussed with patient / caregivers. Next of Kin Name and Isrrael Pat [facility] [phone]    Simone Hassanveronica discussed with Dr. Anh Treviño. Subjective:   Pt was seen and examined at bedside. She reports a mild headache but states that she is otherwise feeling about the same. She denies any CP, SOB, dizziness, abdominal pain, nausea, vomiting.      Objective:   Physical examination  Patient Vitals for the past 24 hrs:   Temp Pulse Resp BP SpO2   21 2242 98.2 °F (36.8 °C) (!) 127 19 108/80 99 %   21 1930    126/75 97 %   21 1918     95 %   21 191    (!) 108/57 96 %   21 1900    (!) 106/54 92 %   21 1818 96.9 °F (36.1 °C) (!) 108 18 98/66 97 %      Temp (24hrs), Av.6 °F (36.4 °C), Min:96.9 °F (36.1 °C), Max:98.2 °F (36.8 °C)         O2 Device: None (Room air)      General:   Alert, cooperative, no acute distress   Head:   Atraumatic   Eyes:   Conjunctivae clear   ENT:  Oral mucosa normal   Neck:  Supple   Lungs:   Clear to auscultation bilaterally, no wheezing/rales/rhonchi     Heart:   Normal rate, regular rhythm, no murmur   Abdomen:    Soft, non-tender, non-distended    Extremities:  No edema   Pulses:  Symmetric all extremities   Skin:  Warm and dry   Neurologic:  Alert and oriented, no focal deficits   Urinary catheter:  Pure-wick in place     Data Review:     CBC:  Recent Labs     21  1836   WBC 7.5   HGB 12.1   HCT 38.6        Metabolic Panel:  Recent Labs     21  1836   *   K 4.7   CL 92*   CO2 27   BUN 30*   CREA 2.17*   *   CA 9.6   MG 2.0   PHOS 3.1   ALB 3.9   TBILI 0.4   ALT 38       Imaging:  No results found.   Medications reviewed  Current Facility-Administered Medications   Medication Dose Route Frequency    insulin regular (NOVOLIN R, HUMULIN R) 100 Units in 0.9% sodium chloride 100 mL infusion  1-50 Units/hr IntraVENous TITRATE    glucose chewable tablet 16 g  4 Tab Oral PRN    dextrose (D50W) injection syrg 12.5-25 g  25-50 mL IntraVENous PRN    glucagon (GLUCAGEN) injection 1 mg  1 mg IntraMUSCular PRN    0.45% sodium chloride infusion  200 mL/hr IntraVENous CONTINUOUS    sodium chloride (NS) flush 5-40 mL  5-40 mL IntraVENous Q8H    sodium chloride (NS) flush 5-40 mL  5-40 mL IntraVENous PRN    acetaminophen (TYLENOL) tablet 650 mg  650 mg Oral Q6H PRN    Or    acetaminophen (TYLENOL) suppository 650 mg  650 mg Rectal Q6H PRN    polyethylene glycol (MIRALAX) packet 17 g  17 g Oral DAILY PRN    heparin (porcine) injection 5,000 Units  5,000 Units SubCUTAneous Q8H    cefTRIAXone (ROCEPHIN) 1 g in sterile water (preservative free) 10 mL IV syringe  1 g IntraVENous Q24H    pantoprazole (PROTONIX) tablet 40 mg  40 mg Oral ACB    PARoxetine (PAXIL) tablet 30 mg  30 mg Oral QHS    atorvastatin (LIPITOR) tablet 20 mg  20 mg Oral QHS    [Held by provider] lisinopriL (PRINIVIL, ZESTRIL) tablet 40 mg  40 mg Oral DAILY    [Held by provider] hydroCHLOROthiazide (HYDRODIURIL) tablet 25 mg  25 mg Oral DAILY         Signed:   Pamela Sykes DO  Family Medicine Resident      Attending note: Attending note to follow. ..

## 2025-05-02 ENCOUNTER — TELEPHONE (OUTPATIENT)
Facility: CLINIC | Age: 70
End: 2025-05-02

## 2025-05-02 NOTE — TELEPHONE ENCOUNTER
Attempted to contact patient regarding upcoming Medicare wellness appointment and completion of HRA questionnaire. LVM for patient to please return call at  [phone]

## 2025-05-03 SDOH — ECONOMIC STABILITY: FOOD INSECURITY: WITHIN THE PAST 12 MONTHS, THE FOOD YOU BOUGHT JUST DIDN'T LAST AND YOU DIDN'T HAVE MONEY TO GET MORE.: NEVER TRUE

## 2025-05-03 SDOH — ECONOMIC STABILITY: INCOME INSECURITY: IN THE LAST 12 MONTHS, WAS THERE A TIME WHEN YOU WERE NOT ABLE TO PAY THE MORTGAGE OR RENT ON TIME?: NO

## 2025-05-03 SDOH — ECONOMIC STABILITY: FOOD INSECURITY: WITHIN THE PAST 12 MONTHS, YOU WORRIED THAT YOUR FOOD WOULD RUN OUT BEFORE YOU GOT MONEY TO BUY MORE.: NEVER TRUE

## 2025-05-03 SDOH — HEALTH STABILITY: PHYSICAL HEALTH: ON AVERAGE, HOW MANY MINUTES DO YOU ENGAGE IN EXERCISE AT THIS LEVEL?: 0 MIN

## 2025-05-03 SDOH — ECONOMIC STABILITY: TRANSPORTATION INSECURITY
IN THE PAST 12 MONTHS, HAS THE LACK OF TRANSPORTATION KEPT YOU FROM MEDICAL APPOINTMENTS OR FROM GETTING MEDICATIONS?: NO

## 2025-05-03 SDOH — ECONOMIC STABILITY: TRANSPORTATION INSECURITY
IN THE PAST 12 MONTHS, HAS LACK OF TRANSPORTATION KEPT YOU FROM MEETINGS, WORK, OR FROM GETTING THINGS NEEDED FOR DAILY LIVING?: NO

## 2025-05-03 ASSESSMENT — PATIENT HEALTH QUESTIONNAIRE - PHQ9
2. FEELING DOWN, DEPRESSED OR HOPELESS: NOT AT ALL
SUM OF ALL RESPONSES TO PHQ QUESTIONS 1-9: 0
SUM OF ALL RESPONSES TO PHQ QUESTIONS 1-9: 0
1. LITTLE INTEREST OR PLEASURE IN DOING THINGS: NOT AT ALL
SUM OF ALL RESPONSES TO PHQ QUESTIONS 1-9: 0
SUM OF ALL RESPONSES TO PHQ QUESTIONS 1-9: 0

## 2025-05-03 ASSESSMENT — LIFESTYLE VARIABLES
HOW OFTEN DO YOU HAVE A DRINK CONTAINING ALCOHOL: NEVER
HOW MANY STANDARD DRINKS CONTAINING ALCOHOL DO YOU HAVE ON A TYPICAL DAY: 0
HOW MANY STANDARD DRINKS CONTAINING ALCOHOL DO YOU HAVE ON A TYPICAL DAY: PATIENT DOES NOT DRINK
HOW OFTEN DO YOU HAVE SIX OR MORE DRINKS ON ONE OCCASION: 1
HOW OFTEN DO YOU HAVE A DRINK CONTAINING ALCOHOL: 1

## 2025-05-06 ENCOUNTER — OFFICE VISIT (OUTPATIENT)
Facility: CLINIC | Age: 70
End: 2025-05-06
Payer: MEDICARE

## 2025-05-06 VITALS
HEART RATE: 71 BPM | RESPIRATION RATE: 16 BRPM | HEIGHT: 62 IN | OXYGEN SATURATION: 98 % | BODY MASS INDEX: 34.96 KG/M2 | TEMPERATURE: 98 F | WEIGHT: 190 LBS | DIASTOLIC BLOOD PRESSURE: 81 MMHG | SYSTOLIC BLOOD PRESSURE: 126 MMHG

## 2025-05-06 DIAGNOSIS — R73.9 ELEVATED BLOOD SUGAR: ICD-10-CM

## 2025-05-06 DIAGNOSIS — E78.00 HYPERCHOLESTEROLEMIA: ICD-10-CM

## 2025-05-06 DIAGNOSIS — F41.9 ANXIETY: ICD-10-CM

## 2025-05-06 DIAGNOSIS — E11.00 TYPE 2 DIABETES MELLITUS WITH HYPEROSMOLAR HYPERGLYCEMIC STATE (HHS) (HCC): ICD-10-CM

## 2025-05-06 DIAGNOSIS — Z00.00 MEDICARE ANNUAL WELLNESS VISIT, SUBSEQUENT: Primary | ICD-10-CM

## 2025-05-06 DIAGNOSIS — I10 PRIMARY HYPERTENSION: ICD-10-CM

## 2025-05-06 DIAGNOSIS — Z79.4 TYPE 2 DIABETES MELLITUS WITH HYPERGLYCEMIA, WITH LONG-TERM CURRENT USE OF INSULIN (HCC): ICD-10-CM

## 2025-05-06 DIAGNOSIS — E11.65 TYPE 2 DIABETES MELLITUS WITH HYPERGLYCEMIA, WITH LONG-TERM CURRENT USE OF INSULIN (HCC): ICD-10-CM

## 2025-05-06 DIAGNOSIS — L40.50 ARTHROPATHIC PSORIASIS, UNSPECIFIED (HCC): ICD-10-CM

## 2025-05-06 DIAGNOSIS — N18.32 STAGE 3B CHRONIC KIDNEY DISEASE (HCC): ICD-10-CM

## 2025-05-06 PROCEDURE — 1123F ACP DISCUSS/DSCN MKR DOCD: CPT | Performed by: FAMILY MEDICINE

## 2025-05-06 PROCEDURE — 2022F DILAT RTA XM EVC RTNOPTHY: CPT | Performed by: FAMILY MEDICINE

## 2025-05-06 PROCEDURE — G8427 DOCREV CUR MEDS BY ELIG CLIN: HCPCS | Performed by: FAMILY MEDICINE

## 2025-05-06 PROCEDURE — 3046F HEMOGLOBIN A1C LEVEL >9.0%: CPT | Performed by: FAMILY MEDICINE

## 2025-05-06 PROCEDURE — 3079F DIAST BP 80-89 MM HG: CPT | Performed by: FAMILY MEDICINE

## 2025-05-06 PROCEDURE — 3074F SYST BP LT 130 MM HG: CPT | Performed by: FAMILY MEDICINE

## 2025-05-06 PROCEDURE — G8417 CALC BMI ABV UP PARAM F/U: HCPCS | Performed by: FAMILY MEDICINE

## 2025-05-06 PROCEDURE — 1036F TOBACCO NON-USER: CPT | Performed by: FAMILY MEDICINE

## 2025-05-06 PROCEDURE — 1090F PRES/ABSN URINE INCON ASSESS: CPT | Performed by: FAMILY MEDICINE

## 2025-05-06 PROCEDURE — G0439 PPPS, SUBSEQ VISIT: HCPCS | Performed by: FAMILY MEDICINE

## 2025-05-06 PROCEDURE — 1159F MED LIST DOCD IN RCRD: CPT | Performed by: FAMILY MEDICINE

## 2025-05-06 PROCEDURE — 1126F AMNT PAIN NOTED NONE PRSNT: CPT | Performed by: FAMILY MEDICINE

## 2025-05-06 PROCEDURE — G8400 PT W/DXA NO RESULTS DOC: HCPCS | Performed by: FAMILY MEDICINE

## 2025-05-06 PROCEDURE — 1160F RVW MEDS BY RX/DR IN RCRD: CPT | Performed by: FAMILY MEDICINE

## 2025-05-06 PROCEDURE — 99214 OFFICE O/P EST MOD 30 MIN: CPT | Performed by: FAMILY MEDICINE

## 2025-05-06 PROCEDURE — 3017F COLORECTAL CA SCREEN DOC REV: CPT | Performed by: FAMILY MEDICINE

## 2025-05-06 RX ORDER — ALPRAZOLAM 0.25 MG
0.25 TABLET ORAL PRN
Qty: 30 TABLET | Refills: 0 | Status: SHIPPED | OUTPATIENT
Start: 2025-05-06 | End: 2026-05-06

## 2025-05-06 RX ORDER — PEN NEEDLE, DIABETIC 32GX 5/32"
NEEDLE, DISPOSABLE MISCELLANEOUS
Qty: 200 EACH | Refills: 5 | Status: SHIPPED | OUTPATIENT
Start: 2025-05-06

## 2025-05-06 RX ORDER — INSULIN GLARGINE 100 [IU]/ML
INJECTION, SOLUTION SUBCUTANEOUS
Qty: 10 ADJUSTABLE DOSE PRE-FILLED PEN SYRINGE | Refills: 3 | Status: SHIPPED | OUTPATIENT
Start: 2025-05-06

## 2025-05-06 ASSESSMENT — PATIENT HEALTH QUESTIONNAIRE - PHQ9
9. THOUGHTS THAT YOU WOULD BE BETTER OFF DEAD, OR OF HURTING YOURSELF: NOT AT ALL
10. IF YOU CHECKED OFF ANY PROBLEMS, HOW DIFFICULT HAVE THESE PROBLEMS MADE IT FOR YOU TO DO YOUR WORK, TAKE CARE OF THINGS AT HOME, OR GET ALONG WITH OTHER PEOPLE: NOT DIFFICULT AT ALL
SUM OF ALL RESPONSES TO PHQ QUESTIONS 1-9: 0
5. POOR APPETITE OR OVEREATING: NOT AT ALL
SUM OF ALL RESPONSES TO PHQ QUESTIONS 1-9: 0
2. FEELING DOWN, DEPRESSED OR HOPELESS: NOT AT ALL
7. TROUBLE CONCENTRATING ON THINGS, SUCH AS READING THE NEWSPAPER OR WATCHING TELEVISION: NOT AT ALL
4. FEELING TIRED OR HAVING LITTLE ENERGY: NOT AT ALL
SUM OF ALL RESPONSES TO PHQ QUESTIONS 1-9: 0
SUM OF ALL RESPONSES TO PHQ QUESTIONS 1-9: 0
1. LITTLE INTEREST OR PLEASURE IN DOING THINGS: NOT AT ALL
8. MOVING OR SPEAKING SO SLOWLY THAT OTHER PEOPLE COULD HAVE NOTICED. OR THE OPPOSITE, BEING SO FIGETY OR RESTLESS THAT YOU HAVE BEEN MOVING AROUND A LOT MORE THAN USUAL: NOT AT ALL
6. FEELING BAD ABOUT YOURSELF - OR THAT YOU ARE A FAILURE OR HAVE LET YOURSELF OR YOUR FAMILY DOWN: NOT AT ALL
3. TROUBLE FALLING OR STAYING ASLEEP: NOT AT ALL

## 2025-05-06 NOTE — PROGRESS NOTES
Chief Complaint   Patient presents with    Medicare AWV     Patient presents in office today for AMW visit and fasting labs.  No concerns.        [facility]  5/6/2025  Provider:   [place]:  Diabetes Report Card   1) Have you seen the eye doctor in past year?yes    2) How would you  rate your Diabetic Diet?5/10   3) How well do you take care of your feet?good   4) Do you keep your Primary Care Follow Up Appts?yes    5) Do you know your A1C goal?no    6) Do you take your medications daily?yes    7) Do you check your blood sugars?yes    8) Have you gained weight?yes       9) Do you follow an exercise program?no    10) Can you do better?yes      Hemoglobin A1C   Date Value Ref Range Status   05/03/2024 8.6 (H) 4.0 - 5.6 % Final     Comment:     (NOTE)  HbA1C Interpretive Ranges  <5.7              Normal  5.7 - 6.4         Consider Prediabetes  >6.5              Consider Diabetes         Have you been to the ER, urgent care clinic since your last visit?  Hospitalized since your last visit?   NO    Have you seen or consulted any other health care providers outside our system since your last visit?   NO    Have you had a mammogram?”   NO    No breast cancer screening on file       “Have you had a colorectal cancer screening such as a colonoscopy/FIT/Cologuard?    NO    No colonoscopy on file  No cologuard on file  No FIT/FOBT on file   No flexible sigmoidoscopy on file     “Have you had a diabetic eye exam?”    YES - Where: [facility] Nurse/CMA to request most recent records if not in the chart     Date of last diabetic eye exam: 5/19/2023          
guardian, if applicable) and other individuals in attendance with the patient were advised that Artificial Intelligence will be utilized during this visit to record and process the conversation to generate a clinical note. The patient (or guardian, if applicable) and other individuals in attendance at the appointment consented to the use of AI, including the recording.

## 2025-05-06 NOTE — PATIENT INSTRUCTIONS
Learning About Being Active as an Older Adult  Why is being active important as you get older?     Being active is one of the best things you can do for your health. And it's never too late to start. Being active--or getting active, if you aren't already--has definite benefits. It can:  Give you more energy,  Keep your mind sharp.  Improve balance to reduce your risk of falls.  Help you manage chronic illness with fewer medicines.  No matter how old you are, how fit you are, or what health problems you have, there is a form of activity that will work for you. And the more physical activity you can do, the better your overall health will be.  What kinds of activity can help you stay healthy?  Being more active will make your daily activities easier. Physical activity includes planned exercise and things you do in daily life. There are four types of activity:  Aerobic.  Doing aerobic activity makes your heart and lungs strong.  Includes walking, dancing, and gardening.  Aim for at least 2½ hours spread throughout the week.  It improves your energy and can help you sleep better.  Muscle-strengthening.  This type of activity can help maintain muscle and strengthen bones.  Includes climbing stairs, using resistance bands, and lifting or carrying heavy loads.  Aim for at least twice a week.  It can help protect the knees and other joints.  Stretching.  Stretching gives you better range of motion in joints and muscles.  Includes upper arm stretches, calf stretches, and gentle yoga.  Aim for at least twice a week, preferably after your muscles are warmed up from other activities.  It can help you function better in daily life.  Balancing.  This helps you stay coordinated and have good posture.  Includes heel-to-toe walking, paige chi, and certain types of yoga.  Aim for at least 3 days a week.  It can reduce your risk of falling.  Even if you have a hard time meeting the recommendations, it's better to be more active

## 2025-05-07 ENCOUNTER — RESULTS FOLLOW-UP (OUTPATIENT)
Facility: CLINIC | Age: 70
End: 2025-05-07

## 2025-05-07 LAB
ALBUMIN SERPL-MCNC: 3.9 G/DL (ref 3.5–5)
ALBUMIN/GLOB SERPL: 1.3 (ref 1.1–2.2)
ALP SERPL-CCNC: 116 U/L (ref 45–117)
ALT SERPL-CCNC: 22 U/L (ref 12–78)
ANION GAP SERPL CALC-SCNC: 5 MMOL/L (ref 2–12)
AST SERPL-CCNC: 19 U/L (ref 15–37)
BASOPHILS # BLD: 0.04 K/UL (ref 0–0.1)
BASOPHILS NFR BLD: 0.5 % (ref 0–1)
BILIRUB SERPL-MCNC: 0.3 MG/DL (ref 0.2–1)
BUN SERPL-MCNC: 19 MG/DL (ref 6–20)
BUN/CREAT SERPL: 15 (ref 12–20)
CALCIUM SERPL-MCNC: 10.1 MG/DL (ref 8.5–10.1)
CHLORIDE SERPL-SCNC: 106 MMOL/L (ref 97–108)
CHOLEST SERPL-MCNC: 150 MG/DL
CO2 SERPL-SCNC: 29 MMOL/L (ref 21–32)
CREAT SERPL-MCNC: 1.27 MG/DL (ref 0.55–1.02)
CREAT UR-MCNC: 96.6 MG/DL
DIFFERENTIAL METHOD BLD: ABNORMAL
EOSINOPHIL # BLD: 0.41 K/UL (ref 0–0.4)
EOSINOPHIL NFR BLD: 5.1 % (ref 0–7)
ERYTHROCYTE [DISTWIDTH] IN BLOOD BY AUTOMATED COUNT: 14.2 % (ref 11.5–14.5)
EST. AVERAGE GLUCOSE BLD GHB EST-MCNC: 192 MG/DL
GLOBULIN SER CALC-MCNC: 3.1 G/DL (ref 2–4)
GLUCOSE SERPL-MCNC: 142 MG/DL (ref 65–100)
HBA1C MFR BLD: 8.3 % (ref 4–5.6)
HCT VFR BLD AUTO: 38.7 % (ref 35–47)
HDLC SERPL-MCNC: 65 MG/DL
HDLC SERPL: 2.3 (ref 0–5)
HGB BLD-MCNC: 12.3 G/DL (ref 11.5–16)
IMM GRANULOCYTES # BLD AUTO: 0.02 K/UL (ref 0–0.04)
IMM GRANULOCYTES NFR BLD AUTO: 0.2 % (ref 0–0.5)
LDLC SERPL CALC-MCNC: 66.4 MG/DL (ref 0–100)
LYMPHOCYTES # BLD: 2.72 K/UL (ref 0.8–3.5)
LYMPHOCYTES NFR BLD: 33.5 % (ref 12–49)
MCH RBC QN AUTO: 29.1 PG (ref 26–34)
MCHC RBC AUTO-ENTMCNC: 31.8 G/DL (ref 30–36.5)
MCV RBC AUTO: 91.7 FL (ref 80–99)
MICROALBUMIN UR-MCNC: 6.53 MG/DL
MICROALBUMIN/CREAT UR-RTO: 68 MG/G (ref 0–30)
MONOCYTES # BLD: 0.58 K/UL (ref 0–1)
MONOCYTES NFR BLD: 7.2 % (ref 5–13)
NEUTS SEG # BLD: 4.34 K/UL (ref 1.8–8)
NEUTS SEG NFR BLD: 53.5 % (ref 32–75)
NRBC # BLD: 0 K/UL (ref 0–0.01)
NRBC BLD-RTO: 0 PER 100 WBC
PLATELET # BLD AUTO: 187 K/UL (ref 150–400)
PMV BLD AUTO: 13 FL (ref 8.9–12.9)
POTASSIUM SERPL-SCNC: 4.5 MMOL/L (ref 3.5–5.1)
PROT SERPL-MCNC: 7 G/DL (ref 6.4–8.2)
RBC # BLD AUTO: 4.22 M/UL (ref 3.8–5.2)
SODIUM SERPL-SCNC: 140 MMOL/L (ref 136–145)
TRIGL SERPL-MCNC: 93 MG/DL
TSH SERPL DL<=0.05 MIU/L-ACNC: 2.53 UIU/ML (ref 0.36–3.74)
VLDLC SERPL CALC-MCNC: 18.6 MG/DL
WBC # BLD AUTO: 8.1 K/UL (ref 3.6–11)

## 2025-05-17 ENCOUNTER — PATIENT MESSAGE (OUTPATIENT)
Facility: CLINIC | Age: 70
End: 2025-05-17

## 2025-05-17 DIAGNOSIS — E11.65 TYPE 2 DIABETES MELLITUS WITH HYPERGLYCEMIA, WITH LONG-TERM CURRENT USE OF INSULIN (HCC): ICD-10-CM

## 2025-05-17 DIAGNOSIS — Z79.4 TYPE 2 DIABETES MELLITUS WITH HYPERGLYCEMIA, WITH LONG-TERM CURRENT USE OF INSULIN (HCC): ICD-10-CM

## 2025-05-17 DIAGNOSIS — E11.00 TYPE 2 DIABETES MELLITUS WITH HYPEROSMOLAR HYPERGLYCEMIC STATE (HHS) (HCC): ICD-10-CM

## 2025-05-17 DIAGNOSIS — R73.9 ELEVATED BLOOD SUGAR: ICD-10-CM

## 2025-05-20 RX ORDER — INSULIN GLARGINE 100 [IU]/ML
INJECTION, SOLUTION SUBCUTANEOUS
Qty: 10 ADJUSTABLE DOSE PRE-FILLED PEN SYRINGE | Refills: 3 | Status: SHIPPED | OUTPATIENT
Start: 2025-05-20

## 2025-06-10 ENCOUNTER — TELEPHONE (OUTPATIENT)
Facility: CLINIC | Age: 70
End: 2025-06-10

## 2025-06-10 NOTE — TELEPHONE ENCOUNTER
[facility] needs a refill of Simvastatin.  They have 0 pills/supply left and are requesting a 90 day supply with refills.  Pharmacy has been updated in the chart. Patient was advised or scheduled an appointment for the future and to request refills thru the [employer] [facility] or by requesting a refill from their pharmacy in the future.  Patient was also advised to check with their pharmacy for status of when refills are available.

## 2025-06-11 RX ORDER — SIMVASTATIN 80 MG
80 TABLET ORAL NIGHTLY
Qty: 90 TABLET | Refills: 3 | Status: SHIPPED | OUTPATIENT
Start: 2025-06-11

## 2025-07-31 DIAGNOSIS — F41.9 ANXIETY: ICD-10-CM

## 2025-07-31 RX ORDER — ALPRAZOLAM 0.25 MG
0.25 TABLET ORAL PRN
Qty: 30 TABLET | Refills: 0 | Status: SHIPPED | OUTPATIENT
Start: 2025-07-31 | End: 2026-07-31

## 2025-08-11 ENCOUNTER — PATIENT MESSAGE (OUTPATIENT)
Facility: CLINIC | Age: 70
End: 2025-08-11

## 2025-08-11 RX ORDER — PANTOPRAZOLE SODIUM 40 MG/1
40 TABLET, DELAYED RELEASE ORAL DAILY
Qty: 90 TABLET | Refills: 3 | Status: SHIPPED | OUTPATIENT
Start: 2025-08-11

## 2025-08-11 RX ORDER — ALLOPURINOL 300 MG/1
300 TABLET ORAL DAILY
Qty: 90 TABLET | Refills: 3 | Status: SHIPPED | OUTPATIENT
Start: 2025-08-11

## 2025-08-12 RX ORDER — LISINOPRIL 20 MG/1
20 TABLET ORAL DAILY
Qty: 90 TABLET | Refills: 0 | Status: SHIPPED | OUTPATIENT
Start: 2025-08-12

## 2025-08-13 RX ORDER — LISINOPRIL 20 MG/1
20 TABLET ORAL DAILY
Qty: 90 TABLET | Refills: 0 | Status: SHIPPED | OUTPATIENT
Start: 2025-08-13

## 2025-08-18 RX ORDER — PAROXETINE 30 MG/1
30 TABLET, FILM COATED ORAL DAILY
Qty: 90 TABLET | Refills: 3 | Status: SHIPPED | OUTPATIENT
Start: 2025-08-18

## 2025-08-19 RX ORDER — BLOOD SUGAR DIAGNOSTIC
STRIP MISCELLANEOUS
Qty: 300 STRIP | Refills: 5 | Status: SHIPPED | OUTPATIENT
Start: 2025-08-19

## 2025-08-19 RX ORDER — LANCETS
EACH MISCELLANEOUS
Qty: 300 EACH | Refills: 5 | Status: SHIPPED | OUTPATIENT
Start: 2025-08-19